# Patient Record
Sex: FEMALE | Race: WHITE | Employment: OTHER | URBAN - METROPOLITAN AREA
[De-identification: names, ages, dates, MRNs, and addresses within clinical notes are randomized per-mention and may not be internally consistent; named-entity substitution may affect disease eponyms.]

---

## 2017-01-17 ENCOUNTER — TRANSFERRED RECORDS (OUTPATIENT)
Dept: HEALTH INFORMATION MANAGEMENT | Facility: CLINIC | Age: 67
End: 2017-01-17

## 2017-01-20 ENCOUNTER — ANESTHESIA (OUTPATIENT)
Dept: SURGERY | Facility: CLINIC | Age: 67
End: 2017-01-20
Payer: MEDICARE

## 2017-01-20 ENCOUNTER — SURGERY (OUTPATIENT)
Age: 67
End: 2017-01-20

## 2017-01-20 PROCEDURE — 25000125 ZZHC RX 250: Performed by: NURSE ANESTHETIST, CERTIFIED REGISTERED

## 2017-01-20 PROCEDURE — 25000128 H RX IP 250 OP 636: Performed by: NURSE ANESTHETIST, CERTIFIED REGISTERED

## 2017-01-20 PROCEDURE — 25000125 ZZHC RX 250: Performed by: ORTHOPAEDIC SURGERY

## 2017-01-20 PROCEDURE — 25800025 ZZH RX 258: Performed by: ANESTHESIOLOGY

## 2017-01-20 RX ORDER — NEOSTIGMINE METHYLSULFATE 1 MG/ML
VIAL (ML) INJECTION PRN
Status: DISCONTINUED | OUTPATIENT
Start: 2017-01-20 | End: 2017-01-20

## 2017-01-20 RX ORDER — LIDOCAINE HYDROCHLORIDE 20 MG/ML
INJECTION, SOLUTION INFILTRATION; PERINEURAL PRN
Status: DISCONTINUED | OUTPATIENT
Start: 2017-01-20 | End: 2017-01-20

## 2017-01-20 RX ORDER — PROPOFOL 10 MG/ML
INJECTION, EMULSION INTRAVENOUS PRN
Status: DISCONTINUED | OUTPATIENT
Start: 2017-01-20 | End: 2017-01-20

## 2017-01-20 RX ORDER — ONDANSETRON 2 MG/ML
INJECTION INTRAMUSCULAR; INTRAVENOUS PRN
Status: DISCONTINUED | OUTPATIENT
Start: 2017-01-20 | End: 2017-01-20

## 2017-01-20 RX ORDER — LIDOCAINE HYDROCHLORIDE 10 MG/ML
INJECTION, SOLUTION INFILTRATION; PERINEURAL PRN
Status: DISCONTINUED | OUTPATIENT
Start: 2017-01-20 | End: 2017-01-20

## 2017-01-20 RX ORDER — FENTANYL CITRATE 50 UG/ML
INJECTION, SOLUTION INTRAMUSCULAR; INTRAVENOUS PRN
Status: DISCONTINUED | OUTPATIENT
Start: 2017-01-20 | End: 2017-01-20

## 2017-01-20 RX ORDER — GLYCOPYRROLATE 0.2 MG/ML
INJECTION, SOLUTION INTRAMUSCULAR; INTRAVENOUS PRN
Status: DISCONTINUED | OUTPATIENT
Start: 2017-01-20 | End: 2017-01-20

## 2017-01-20 RX ORDER — EPHEDRINE SULFATE 50 MG/ML
INJECTION, SOLUTION INTRAMUSCULAR; INTRAVENOUS; SUBCUTANEOUS PRN
Status: DISCONTINUED | OUTPATIENT
Start: 2017-01-20 | End: 2017-01-20

## 2017-01-20 RX ADMIN — FENTANYL CITRATE 50 MCG: 50 INJECTION, SOLUTION INTRAMUSCULAR; INTRAVENOUS at 08:32

## 2017-01-20 RX ADMIN — SODIUM CHLORIDE, POTASSIUM CHLORIDE, SODIUM LACTATE AND CALCIUM CHLORIDE: 600; 310; 30; 20 INJECTION, SOLUTION INTRAVENOUS at 07:30

## 2017-01-20 RX ADMIN — CEFAZOLIN SODIUM 2 G: 2 INJECTION, SOLUTION INTRAVENOUS at 08:14

## 2017-01-20 RX ADMIN — PROPOFOL 160 MG: 10 INJECTION, EMULSION INTRAVENOUS at 07:47

## 2017-01-20 RX ADMIN — ONDANSETRON 4 MG: 2 INJECTION INTRAMUSCULAR; INTRAVENOUS at 09:01

## 2017-01-20 RX ADMIN — LIDOCAINE HYDROCHLORIDE 0.1 ML: 10 INJECTION, SOLUTION INFILTRATION; PERINEURAL at 07:15

## 2017-01-20 RX ADMIN — ROCURONIUM BROMIDE 50 MG: 10 INJECTION INTRAVENOUS at 07:47

## 2017-01-20 RX ADMIN — GLYCOPYRROLATE 0.6 MG: 0.2 INJECTION, SOLUTION INTRAMUSCULAR; INTRAVENOUS at 09:03

## 2017-01-20 RX ADMIN — LIDOCAINE HYDROCHLORIDE 100 MG: 20 INJECTION, SOLUTION INFILTRATION; PERINEURAL at 07:47

## 2017-01-20 RX ADMIN — SODIUM CHLORIDE 125 ML: 900 IRRIGANT IRRIGATION at 09:11

## 2017-01-20 RX ADMIN — NEOSTIGMINE METHYLSULFATE 4 MG: 1 INJECTION INTRAMUSCULAR; INTRAVENOUS; SUBCUTANEOUS at 09:03

## 2017-01-20 RX ADMIN — MIDAZOLAM HYDROCHLORIDE 2 MG: 1 INJECTION, SOLUTION INTRAMUSCULAR; INTRAVENOUS at 07:30

## 2017-01-20 RX ADMIN — FENTANYL CITRATE 50 MCG: 50 INJECTION, SOLUTION INTRAMUSCULAR; INTRAVENOUS at 07:47

## 2017-01-20 RX ADMIN — FENTANYL CITRATE 50 MCG: 50 INJECTION, SOLUTION INTRAMUSCULAR; INTRAVENOUS at 09:30

## 2017-01-20 RX ADMIN — Medication 10 MG: at 07:53

## 2017-02-02 ENCOUNTER — OFFICE VISIT (OUTPATIENT)
Dept: ORTHOPEDICS | Facility: CLINIC | Age: 67
End: 2017-02-02

## 2017-02-02 VITALS — WEIGHT: 191 LBS | BODY MASS INDEX: 35.15 KG/M2 | HEIGHT: 62 IN

## 2017-02-02 DIAGNOSIS — M79.89 SOFT TISSUE MASS: Primary | ICD-10-CM

## 2017-02-02 DIAGNOSIS — D17.9 ATYPICAL LIPOMA OF SOFT TISSUE: Primary | ICD-10-CM

## 2017-02-02 PROBLEM — A04.72 COLITIS DUE TO CLOSTRIDIUM DIFFICILE: Status: ACTIVE | Noted: 2017-01-30

## 2017-02-02 PROBLEM — E66.9 SIMPLE OBESITY: Status: ACTIVE | Noted: 2017-01-17

## 2017-02-02 PROBLEM — R73.03 BORDERLINE DIABETES MELLITUS: Status: ACTIVE | Noted: 2017-01-17

## 2017-02-02 PROBLEM — I10 ESSENTIAL HYPERTENSION: Status: ACTIVE | Noted: 2017-01-17

## 2017-02-02 RX ORDER — GABAPENTIN 100 MG/1
100 CAPSULE ORAL 3 TIMES DAILY
Qty: 90 CAPSULE | Refills: 1 | Status: SHIPPED | OUTPATIENT
Start: 2017-02-02 | End: 2017-10-09

## 2017-02-02 RX ORDER — SUCRALFATE ORAL 1 G/10ML
1000 SUSPENSION ORAL
COMMUNITY
Start: 2017-01-03 | End: 2020-12-03 | Stop reason: DRUGHIGH

## 2017-02-02 RX ORDER — NICOTINE POLACRILEX 2 MG
GUM BUCCAL
COMMUNITY
End: 2020-12-03 | Stop reason: DRUGHIGH

## 2017-02-02 RX ORDER — ALBUTEROL SULFATE 90 UG/1
2 AEROSOL, METERED RESPIRATORY (INHALATION)
COMMUNITY
Start: 2017-01-26 | End: 2020-12-03 | Stop reason: DRUGHIGH

## 2017-02-02 RX ORDER — BUTALBITAL, ASPIRIN, AND CAFFEINE 325; 50; 40 MG/1; MG/1; MG/1
CAPSULE ORAL
COMMUNITY
End: 2017-02-02

## 2017-02-02 NOTE — Clinical Note
2/2/2017       RE: Janey Bo  [address] [facility] [place] [address]     Dear Colleague,    Thank you for referring your patient, Janey Bo, to the [facility] ORTHOPAEDIC CLINIC at [facility] Please see a copy of my visit note below.    Ref MD:    Dr. Jerod Redding, med oncology, [facility]  Dr. David Gaines, general surgery    Dx:    1. 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 Sciatic n compression  3. 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  4. H/o thyroid CA, breast CA, pancreatic CA    Procedures:  1. 11/07/14, Left thigh mass marginal excision (Eric)  2. 1/20/2017, Excision L thigh atypical lipomatous tumor 7 cm , deep. (Eric) [facility]      Returns after surgery above.   Having dysesthesias along lateral foot and ankle  Incision healed.  Peroneal and anterior tib 5/5 function    IMP:  1. Reviewed pathology reports from both 2014 and 2017.  MDM2 amplification was noted in 2014.  2. We discussed the risks, benefits and alternatives of radiation as an adjuvant.  3. She has indicated a preference to forego adjuvant radiation and accept the higher risk of local relapse.  She will also discuss this with Dr. Redding.    4. Rx for neurontin given.  We discussed consequences of long term usage and need to taper gradually.  5. Handicapped parking sticker forms completed today.  6. We will monitor her with MRI q 6 months.      Kofi Reyes MD  [facility] Family Professor  Oncology and Adult Reconstructive Surgery  Dept Orthopaedic Surgery, [facility]  [phone] office, [phone] pager  [email or web address]         Copath Report     Patient Name: JANEY BO   MR#: [id number]   Specimen #:    Collected: 1/20/2017   Received: 1/20/2017   Reported: 1/24/2017 18:38   Ordering Phy(s): KOFI REYES     For improved result formatting, select 'View Enhanced Report Format'   under Linked Documents section.      SPECIMEN(S):   Left thigh mass     FINAL DIAGNOSIS:   Soft tissue, left thigh mass, excision:   - Atypical lipomatous tumor (well-differentiated liposarcoma)   - Tumor size: 7.1 cm   - Extent of tumor: Deep   - No evidence of dedifferentiation   - Margins positive for neoplasm   - Pathologic stage (AJCC 7th ed.): rpT2b   - See comment for complete tumor synopsis     Report Name: Soft Tissue - Resection   Status: Submitted     Part(s) Involved:   A: Left thigh mass     Synoptic Report:     CLINICAL     Preresection Treatment:         - Therapy performed, type not specified - Prior resection     SPECIMEN     Procedure:         - Marginal resection     Primary Tumor Site:         - Connective, subcutaneous and other soft tissues       Site of Connective, Subcutaneous and Other Soft Tissues:           - Lower limb and hip - left thigh     Primary Tumor Site Laterality:         - Left     TUMOR     Histologic Type:         - Adipocytic Tumors       Type of Adipocytic Tumors:           - Atypical lipomatous tumor / Well differentiated liposarcoma     Histologic Grade:         - Grade 1     EXTENT     Macroscopic Extent of Tumor:         - Deep       Extent of Deep Involvement:           - Intramuscular     Tumor Size: 7.1 x 4.5 x 3.1 cm     MARGINS     Margins:         - Margin(s) positive for sarcoma       Margin(s):           - Cannot be determined - Unoriented specimen     ACCESSORY FINDINGS     Mitotic Rate       Mitotic Rate Per 10 High Power Fields (HPF): 0     Necrosis:         - Not identified     Lymph-Vascular Invasion:         - Not identified     Treatment Effect:         - Not identified     SPECIAL STUDIES     Immunohistochemistry:         - Not performed     Cytogenetics:         - Not performed     Molecular Pathology:         - Not performed     LYMPH NODES     Lymph Nodes: No nodes submitted or found     STAGE (PTNM)     TNM Descriptors:         - r (recurrent)     Primary Tumor (pT):         - pT2b:  "Tumor more than 5 cm in greatest dimension, deep tumor     Regional Lymph Nodes (pN):         - pNX: Regional lymph nodes cannot be assessed     Distant Metastasis (pM):         - Not applicable     CAP [facility] March 2016 Annual Release     I have personally reviewed all specimens and or slides, including the   listed special stains, and used them with my medical judgement to   determine the final diagnosis.     Electronically signed out by:     Cristóbal Pinedo M.D., [facility]     CLINICAL HISTORY:   66-year-old woman with recurrent atypical lipomatous tumor, status post   resection from the posterior left thigh in 11/2014.  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     GROSS:   The specimen is received in formalin with proper patient identification,   labeled \"L. thigh mass\", and consists of a 39.6 g, 7.1 x 4.5 x 3.1 cm   well-encapsulated oval portion of pale pink soft tissue.  No orientation   is provided.  The surface is inked black, and the specimen is serially   sectioned to reveal homogeneous pale pink-yellow, glistening cut   surfaces.  Representative sections are submitted in cassettes 1-5.   (Dictated by: Katy Valdez 1/20/2017 11:14 AM)     MICROSCOPIC:   Microscopic examination was performed.     CPT Codes:   A: 12933-MX4     TESTING LAB LOCATION:   [address] [id number] [phone]     COLLECTION SITE:   Client: [facility]   Location: UROR (B)          Patient Name: EMA AUGUSTINE   MR#: [id number]   Specimen #: [id number]   Collected: 11/7/2014   Received: 11/7/2014   Reported: 11/12/2014 14:31   Ordering Phy(s): KOFI MAYS               SPECIMEN(S):   Left posterior thigh mass with " "portion of hamstring     FINAL DIAGNOSIS:   Soft tissue, left thigh mass, excision:   -Atypical lipomatous tumor with intramuscular component (see comment)     COMMENT:   Histologic sections show a lipomatous tumor with focal atypia.  A   component of the tumor shows infiltration between muscle fibers.   Immunostain, performed with appropriate controls shows diffuse   reactivity with antibodies to p16.  Accumulation of p16 has been   attributed to amplification of the MDM2 cell cycle oncogene.   Accordingly, this neoplasm is best classified as atypical lipomatous   tumor/well differentiated liposarcoma, tumor that may be locally   recurrent.       I have personally reviewed all specimens and or slides, including the   listed special stains, and used them with my medical judgement to   determine the final diagnosis.     Electronically signed out by:     Elizabet Salter M.D, [facility]       CLINICAL HISTORY:   The patient is a 64-year-old woman with a left thigh mass.  Operative   procedure: Excision of left thigh mass (procedure identified a 30 cm   lipomatous mass).       GROSS:   The specimen is received in formalin labeled with proper patient's   identification, labeled \"left posterior thigh mass with portion of   hamstring\" and consists of three unoriented yellow-tan adipose tissue   masses measuring 17.2 x 8.5 x 4.1 cm, 8.5 x 7.2 x 2.3 cm, and 11.2 x 3.5   x 2.0 cm. the total weight of specimen is 590 g. The resection margins   are inked in black. Serial sectioning demonstrates homogeneous   yellow-tan adipose tissue mass without evidence of necrosis or   hemorrhage.  The medium sized mass is skeletal muscular tissue with   scattered small amount of adipose tissue component. Representative   sections are submitted in 12 cassettes.     Summary of Sections:   A1-A8 -representative sections of the largest mass   A9-A10 - the smallest mass   A11-A12 - the medium sized mass (Dictated by: Colette Figueroa 11/7/2014 "   01:54 PM)           MICROSCOPIC:   Microscopic examination performed.               CPT Codes:   A: 80206-KW6, 69603-DBT     TESTING LAB LOCATION:   [facility], [address] [place] [address] [phone]     COLLECTION SITE:   Client: [facility]   Location: UROR (B)              Again, thank you for allowing me to participate in the care of your patient.      Sincerely,    John Reyes MD

## 2017-02-02 NOTE — NURSING NOTE
"Reason For Visit:   Chief Complaint   Patient presents with     Surgical Followup     2WK POP F/U Excision Of Left Thigh Mass DOS: 1/20/17           Ht 1.562 m (5' 1.5\")  Wt 86.637 kg (191 lb)  BMI 35.51 kg/m2                  Current Outpatient Prescriptions   Medication     albuterol (VENTOLIN HFA) 108 (90 BASE) MCG/ACT Inhaler     sucralfate (CARAFATE) 1 GM/10ML suspension     aspirin 81 MG tablet     Biotin 1 MG CAPS     Calcium-Magnesium-Vitamin D (CALCIUM MAGNESIUM PO)     Amylase-Lipase-Protease (CREON 10 PO)     NEW MED     NAPROXEN SODIUM PO     MetroNIDAZOLE (FLAGYL PO)     oxyCODONE (ROXICODONE) 5 MG IR tablet     hydrOXYzine (ATARAX) 10 MG tablet     Cephalexin (KEFLEX PO)     amylase-lipase-protease (CREON 24) 43882 UNITS CPEP per EC capsule     raloxifene (EVISTA) 60 MG tablet     Naproxen Sodium (ALEVE PO)     OMEPRAZOLE PO     triamterene-hydrochlorothiazide (DYAZIDE) 37.5-25 MG per capsule     metoprolol (TOPROL-XL) 25 MG 24 hr tablet     cyclobenzaprine (FLEXERIL) 2.5 MG tablet     potassium chloride (K-DUR) 10 MEQ tablet     Calcium Carbonate-Vitamin D (CALCIUM 500+D PO)     Cholecalciferol (VITAMIN D) 1000 UNIT capsule     mesalamine (ASACOL) 400 MG EC tablet     desipramine (NOPRAMIN) 10 MG tablet     albuterol 90 MCG/ACT inhaler     aspirin 81 MG tablet     escitalopram (LEXAPRO) 5 MG tablet     levothyroxine (SYNTHROID, LEVOTHROID) 137 MCG tablet     buPROPion (WELLBUTRIN SR) 200 MG 12 hr tablet     No current facility-administered medications for this visit.       Allergies   Allergen Reactions     Pineapple Anaphylaxis     Walnuts [Nuts] Anaphylaxis     Morphine Hcl Itching       Stefany Cummins C.M.A."

## 2017-02-02 NOTE — PROGRESS NOTES
Ref MD:    Dr. Jerod Redding, med oncology, [facility]  Dr. David Gaines, general surgery    Dx:    1. 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 Sciatic n compression  3. 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  4. H/o thyroid CA, breast CA, pancreatic CA    Procedures:  1. 11/07/14, Left thigh mass marginal excision (Eric)  2. 1/20/2017, Excision L thigh atypical lipomatous tumor 7 cm , deep. (Eric) [facility]      Returns after surgery above.   Having dysesthesias along lateral foot and ankle  Incision healed.  Peroneal and anterior tib 5/5 function    IMP:  1. Reviewed pathology reports from both 2014 and 2017.  MDM2 amplification was noted in 2014.  2. We discussed the risks, benefits and alternatives of radiation as an adjuvant.  3. She has indicated a preference to forego adjuvant radiation and accept the higher risk of local relapse.  She will also discuss this with Dr. Redding.    4. Rx for neurontin given.  We discussed consequences of long term usage and need to taper gradually.  5. Handicapped parking sticker forms completed today.  6. We will monitor her with MRI q 6 months.      Kofi Reyes MD  [facility] Family Professor  Oncology and Adult Reconstructive Surgery  Dept Orthopaedic Surgery, [facility]  [phone] office, [phone] pager  [email or web address]         Copath Report     Patient Name: EMA AUGUSTINE   MR#: [id number]   Specimen #:    Collected: 1/20/2017   Received: 1/20/2017   Reported: 1/24/2017 18:38   Ordering Phy(s): KOFI REYES     For improved result formatting, select 'View Enhanced Report Format'   under Linked Documents section.     SPECIMEN(S):   Left thigh mass     FINAL DIAGNOSIS:   Soft tissue, left thigh mass, excision:   - Atypical lipomatous tumor (well-differentiated liposarcoma)   - Tumor size: 7.1 cm   - Extent of tumor: Deep   - No evidence of dedifferentiation   - Margins positive for neoplasm   - Pathologic stage  (AJCC 7th ed.): rpT2b   - See comment for complete tumor synopsis     Report Name: Soft Tissue - Resection   Status: Submitted     Part(s) Involved:   A: Left thigh mass     Synoptic Report:     CLINICAL     Preresection Treatment:         - Therapy performed, type not specified - Prior resection     SPECIMEN     Procedure:         - Marginal resection     Primary Tumor Site:         - Connective, subcutaneous and other soft tissues       Site of Connective, Subcutaneous and Other Soft Tissues:           - Lower limb and hip - left thigh     Primary Tumor Site Laterality:         - Left     TUMOR     Histologic Type:         - Adipocytic Tumors       Type of Adipocytic Tumors:           - Atypical lipomatous tumor / Well differentiated liposarcoma     Histologic Grade:         - Grade 1     EXTENT     Macroscopic Extent of Tumor:         - Deep       Extent of Deep Involvement:           - Intramuscular     Tumor Size: 7.1 x 4.5 x 3.1 cm     MARGINS     Margins:         - Margin(s) positive for sarcoma       Margin(s):           - Cannot be determined - Unoriented specimen     ACCESSORY FINDINGS     Mitotic Rate       Mitotic Rate Per 10 High Power Fields (HPF): 0     Necrosis:         - Not identified     Lymph-Vascular Invasion:         - Not identified     Treatment Effect:         - Not identified     SPECIAL STUDIES     Immunohistochemistry:         - Not performed     Cytogenetics:         - Not performed     Molecular Pathology:         - Not performed     LYMPH NODES     Lymph Nodes: No nodes submitted or found     STAGE (PTNM)     TNM Descriptors:         - r (recurrent)     Primary Tumor (pT):         - pT2b: Tumor more than 5 cm in greatest dimension, deep tumor     Regional Lymph Nodes (pN):         - pNX: Regional lymph nodes cannot be assessed     Distant Metastasis (pM):         - Not applicable     CAP [facility] March 2016 Annual Release     I have personally reviewed all specimens and or slides, including  "the   listed special stains, and used them with my medical judgement to   determine the final diagnosis.     Electronically signed out by:     Cristóbal Pinedo M.D., [facility]     CLINICAL HISTORY:   66-year-old woman with recurrent atypical lipomatous tumor, status post   resection from the posterior left thigh in 11/2014.  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     GROSS:   The specimen is received in formalin with proper patient identification,   labeled \"L. thigh mass\", and consists of a 39.6 g, 7.1 x 4.5 x 3.1 cm   well-encapsulated oval portion of pale pink soft tissue.  No orientation   is provided.  The surface is inked black, and the specimen is serially   sectioned to reveal homogeneous pale pink-yellow, glistening cut   surfaces.  Representative sections are submitted in cassettes 1-5.   (Dictated by: Katy Valdez 1/20/2017 11:14 AM)     MICROSCOPIC:   Microscopic examination was performed.     CPT Codes:   A: 85235-MO7     TESTING LAB LOCATION:   [address] [address] [phone]     COLLECTION SITE:   Client: [facility]   Location: UROR (B)          Patient Name: EMA AUGUSTINE   MR#: [id number]   Specimen #: [id number]   Collected: 11/7/2014   Received: 11/7/2014   Reported: 11/12/2014 14:31   Ordering Phy(s): KOFI MAYS               SPECIMEN(S):   Left posterior thigh mass with portion of hamstring     FINAL DIAGNOSIS:   Soft tissue, left thigh mass, excision:   -Atypical lipomatous tumor with intramuscular component (see comment)     COMMENT:   Histologic sections show a lipomatous tumor with focal atypia.  A   component of the tumor shows infiltration between muscle fibers. " "  Immunostain, performed with appropriate controls shows diffuse   reactivity with antibodies to p16.  Accumulation of p16 has been   attributed to amplification of the MDM2 cell cycle oncogene.   Accordingly, this neoplasm is best classified as atypical lipomatous   tumor/well differentiated liposarcoma, tumor that may be locally   recurrent.       I have personally reviewed all specimens and or slides, including the   listed special stains, and used them with my medical judgement to   determine the final diagnosis.     Electronically signed out by:     Elizabet Salter M.D, [facility]       CLINICAL HISTORY:   The patient is a 64-year-old woman with a left thigh mass.  Operative   procedure: Excision of left thigh mass (procedure identified a 30 cm   lipomatous mass).       GROSS:   The specimen is received in formalin labeled with proper patient's   identification, labeled \"left posterior thigh mass with portion of   hamstring\" and consists of three unoriented yellow-tan adipose tissue   masses measuring 17.2 x 8.5 x 4.1 cm, 8.5 x 7.2 x 2.3 cm, and 11.2 x 3.5   x 2.0 cm. the total weight of specimen is 590 g. The resection margins   are inked in black. Serial sectioning demonstrates homogeneous   yellow-tan adipose tissue mass without evidence of necrosis or   hemorrhage.  The medium sized mass is skeletal muscular tissue with   scattered small amount of adipose tissue component. Representative   sections are submitted in 12 cassettes.     Summary of Sections:   A1-A8 -representative sections of the largest mass   A9-A10 - the smallest mass   A11-A12 - the medium sized mass (Dictated by: Colette Figueroa 11/7/2014   01:54 PM)           MICROSCOPIC:   Microscopic examination performed.               CPT Codes:   A: 17089-JZ3, 81257-WMQ     TESTING LAB LOCATION:   [address] [address] [phone]     COLLECTION SITE: "   Client: [facility] [place]   Location: UROR (B)

## 2017-07-11 LAB — MAMMOGRAM: NORMAL

## 2017-09-26 DIAGNOSIS — D17.9 ATYPICAL LIPOMA OF SOFT TISSUE: Primary | ICD-10-CM

## 2017-10-09 ENCOUNTER — OFFICE VISIT (OUTPATIENT)
Dept: ORTHOPEDICS | Facility: CLINIC | Age: 67
End: 2017-10-09

## 2017-10-09 VITALS — WEIGHT: 189.5 LBS | BODY MASS INDEX: 35.78 KG/M2 | HEIGHT: 61 IN

## 2017-10-09 DIAGNOSIS — D17.1 BENIGN LIPOMATOUS NEOPLASM OF SKIN AND SUBCUTANEOUS TISSUE OF TRUNK: Primary | ICD-10-CM

## 2017-10-09 ASSESSMENT — ENCOUNTER SYMPTOMS
POOR WOUND HEALING: 0
HOARSE VOICE: 1
NAIL CHANGES: 1
SINUS CONGESTION: 0
SMELL DISTURBANCE: 0
TROUBLE SWALLOWING: 0
SINUS PAIN: 0
SORE THROAT: 0
SKIN CHANGES: 0
TASTE DISTURBANCE: 0
EYE WATERING: 0
NECK MASS: 0
EYE PAIN: 0
DOUBLE VISION: 0

## 2017-10-09 NOTE — NURSING NOTE
"Reason For Visit:   Chief Complaint   Patient presents with     RECHECK     Pt. states that she is here today for follow up after surgery and to review her MRI. HX: Excision Of Left Thigh Mass. DOS: 01/20/2017.      Dx:    1. 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 Sciatic n compression  3. 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  4. H/o thyroid CA, breast CA, pancreatic CA     Procedures:  1. 11/07/14, Left thigh mass marginal excision (Eric)  2. 1/20/2017, Excision L thigh atypical lipomatous tumor 7 cm , deep. (Eric) [facility]        Pain Assessment  Patient Currently in Pain: Yes  Primary Pain Location: Leg  Pain Orientation: Left  Pain Descriptors: Aching, Sore  Alleviating Factors: Rest  Aggravating Factors: Movement, Walking       HEIGHT: 5' 1\", WEIGHT: 189 lbs 8 oz, BMI: Body mass index is 35.81 kg/(m^2).      Current Outpatient Prescriptions   Medication Sig Dispense Refill     albuterol (VENTOLIN HFA) 108 (90 BASE) MCG/ACT Inhaler Inhale 2 puffs into the lungs       sucralfate (CARAFATE) 1 GM/10ML suspension Take 1,000 mg by mouth       aspirin 81 MG tablet Take 81 mg by mouth       Biotin 1 MG CAPS        Calcium-Magnesium-Vitamin D (CALCIUM MAGNESIUM PO) Take 600 mg by mouth       Amylase-Lipase-Protease (CREON 10 PO)        NEW MED        NAPROXEN SODIUM PO        MetroNIDAZOLE (FLAGYL PO)        hydrOXYzine (ATARAX) 10 MG tablet Take 1 tablet (10 mg) by mouth every 6 hours as needed for itching (and nausea) 30 tablet 0     Cephalexin (KEFLEX PO) Take 500 mg by mouth 2 times daily        amylase-lipase-protease (CREON 24) 19873 UNITS CPEP per EC capsule Take 1 capsule by mouth 3 times daily (with meals)       raloxifene (EVISTA) 60 MG tablet Take 60 mg by mouth daily       Naproxen Sodium (ALEVE PO)        OMEPRAZOLE PO Take 20 mg by mouth every morning        triamterene-hydrochlorothiazide (DYAZIDE) 37.5-25 MG per capsule Take 1 capsule by mouth daily 90 capsule 3     metoprolol " (TOPROL-XL) 25 MG 24 hr tablet Take 2 tablets by mouth daily       cyclobenzaprine (FLEXERIL) 2.5 MG tablet Take 1 tablet by mouth daily as needed       potassium chloride (K-DUR) 10 MEQ tablet Take 10 mEq by mouth daily 2 tablets       Calcium Carbonate-Vitamin D (CALCIUM 500+D PO) Take 2 tablets by mouth daily.       Cholecalciferol (VITAMIN D) 1000 UNIT capsule Take 2 capsules by mouth daily.       mesalamine (ASACOL) 400 MG EC tablet Take 2 tablets by mouth 2 times daily        desipramine (NOPRAMIN) 10 MG tablet Take one tab once daily.        albuterol 90 MCG/ACT inhaler Use as directed for exercise induced asthma. 1-2 puffs every 1-2 months       aspirin 81 MG tablet Take 1 tablet by mouth daily.       escitalopram (LEXAPRO) 5 MG tablet Take 5 mg by mouth every evening        levothyroxine (SYNTHROID, LEVOTHROID) 137 MCG tablet Take 137 mcg by mouth every evening        buPROPion (WELLBUTRIN SR) 200 MG 12 hr tablet Take 2 tablets by mouth daily.            Allergies   Allergen Reactions     Pineapple Anaphylaxis     Walnuts [Nuts] Anaphylaxis     Morphine Hcl Itching

## 2017-10-09 NOTE — LETTER
10/9/2017       RE: Janey Bo  [address] [facility] [address]     Dear Colleague,    Thank you for referring your patient, Janey Bo, to the [facility] ORTHOPAEDIC CLINIC at [facility]. Please see a copy of my visit note below.      Orthopaedic Oncology Surgery   Clinic visit -  John Reyes M.D.      Ref MD:    Dr. Jerod Redding, med oncology, [facility]  Dr. David Gaines, general surgery    Dx:    1. 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  3. 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  4. H/o thyroid CA, breast CA, pancreatic CA    Procedures:  1. 11/07/14, Left thigh mass marginal excision (Eric)  2. 1/20/2017, Excision L thigh atypical lipomatous tumor 7 cm, deep. (Eric) [facility]    INTERIM HX:  Returns after surgery above.   Her symptoms of dysesthesias along lateral foot and ankle and partially resolved and she is pleased with the long term outcome of her surgery.  She is not taking, and never wanted to try, the gabapentin.    EXAM:  Incision healed.  Peroneal and anterior tib 5/5 function    MRI 7/2017:  Post surgical changes.  Some intraneural change post-op.  No clear evidence of tumor relapse.    IMP:  1. Reviewed pathology reports from both 2014 and 2017.  MDM2 amplification was noted in 2014.  2. We discussed the risks, benefits and alternatives of radiation as an adjuvant.  3. She has indicated a preference to forego adjuvant radiation and accept the higher risk of local relapse.    4. We will monitor her with MRI q 6 months.        MD Brianna Sommers Family Professor  Oncology and Adult Reconstructive Surgery  Dept Orthopaedic Surgery, [facility]  [phone] office, [phone] pager  [email or web address]    Total Time = 10 min, 50% of which was spent in counseling and coordination of care as documented  above.

## 2017-10-09 NOTE — MR AVS SNAPSHOT
"              After Visit Summary   10/9/2017    Janey Bo    MRN: [id number]           Patient Information     Date Of Birth          1950        Visit Information        Provider Department      10/9/2017 9:15 AM John Reyes MD 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    -  1       Follow-ups after your visit        Who to contact     Please call your clinic at [phone] to:    Ask questions about your health    Make or cancel appointments    Discuss your medicines    Learn about your test results    Speak to your doctor   If you have compliments or concerns about an experience at your clinic, or if you wish to file a complaint, please contact [facility] Patient Relations at [phone] or email us at [email or web address]         Additional Information About Your Visit        MyChart Information     Photo[employer] is an electronic gateway that provides easy, online access to your medical records. With [employer], you can request a clinic appointment, read your test results, renew a prescription or communicate with your care team.     To sign up for PhotoSolart visit the website at [email or web address]   You will be asked to enter the access code listed below, as well as some personal information. Please follow the directions to create your username and password.     Your access code is: [email or web address]  Expires: 2017  6:30 AM     Your access code will  in 90 days. If you need help or a new code, please contact your [facility] or call [phone] for assistance.        Care EveryWhere ID     This is your Care EveryWhere ID. This could be used by other organizations to access your [facility] medical records  [id number]        Your Vitals Were     Height BMI (Body Mass Index)                1.549 m (5' 1\") 35.81 kg/m2           Blood " Pressure from Last 3 Encounters:   01/20/17 91/51   11/10/14 117/54   04/08/14 119/66    Weight from Last 3 Encounters:   10/09/17 86 kg (189 lb 8 oz)   02/02/17 86.6 kg (191 lb)   01/20/17 86.6 kg (191 lb)              Today, you had the following     No orders found for display       Primary Care Provider Office Phone # Fax #    Jerod Redding [phone]       [facility] [address]  [facility] [address]        Equal Access to Services     [facility]: Hadii aad sherry hadasho Somarcie, waaxda luqadaha, qaybta kaalmada courtney, javed carreno . So [facility] [phone].    ATENCIÓN: Si habla español, tiene a nicholas disposición servicios gratuitos de asistencia lingüística. [facility] [phone].    We comply with applicable federal civil rights laws and [place] laws. We do not discriminate on the basis of race, color, national origin, age, disability, sex, sexual orientation, or gender identity.            Thank you!     Thank you for choosing [facility]  for your care. Our goal is always to provide you with excellent care. Hearing back from our patients is one way we can continue to improve our services. Please take a few minutes to complete the written survey that you may receive in the mail after your visit with us. Thank you!             Your Updated Medication List - Protect others around you: Learn how to safely use, store and throw away your medicines at www.disposemymeds.org.          This list is accurate as of: 10/9/17 10:04 AM.  Always use your most recent med list.                   Brand Name Dispense Instructions for use Diagnosis    albuterol 90 MCG/ACT inhaler      Use as directed for exercise induced asthma. 1-2 puffs every 1-2 months        ALEVE PO           amylase-lipase-protease 65518-53252 UNITS Cpep per EC capsule    CREON 24     Take 1 capsule by mouth 3 times daily (with meals)        ASACOL 400 MG EC tablet    Generic drug:  mesalamine      Take 2 tablets by mouth 2 times daily        * aspirin 81 MG tablet      Take 81 mg by mouth        * aspirin 81 MG tablet      Take 1 tablet by mouth daily.        Biotin 1 MG Caps           CALCIUM 500+D PO      Take 2 tablets by mouth daily.        CALCIUM MAGNESIUM PO      Take 600 mg by mouth        CREON 10 PO           cyclobenzaprine 2.5 mg half-tab    FLEXERIL     Take 1 tablet by mouth daily as needed        desipramine 10 MG tablet    NORPRAMIN     Take one tab once daily.        DYAZIDE 37.5-25 MG per capsule   Generic drug:  triamterene-hydrochlorothiazide     90 capsule    Take 1 capsule by mouth daily        FLAGYL PO           hydrOXYzine 10 MG tablet    ATARAX    30 tablet    Take 1 tablet (10 mg) by mouth every 6 hours as needed for itching (and nausea)    Soft tissue mass       KEFLEX PO      Take 500 mg by mouth 2 times daily        levothyroxine 137 MCG tablet    SYNTHROID/LEVOTHROID     Take 137 mcg by mouth every evening        LEXAPRO 5 MG tablet   Generic drug:  escitalopram      Take 5 mg by mouth every evening        metoprolol 25 MG 24 hr tablet    TOPROL-XL     Take 2 tablets by mouth daily        NAPROXEN SODIUM PO           NEW MED           OMEPRAZOLE PO      Take 20 mg by mouth every morning        potassium chloride 10 MEQ tablet    K-TAB,KLOR-CON     Take 10 mEq by mouth daily 2 tablets        raloxifene 60 MG tablet    Evista     Take 60 mg by mouth daily        sucralfate 1 GM/10ML suspension    CARAFATE     Take 1,000 mg by mouth        VENTOLIN  (90 BASE) MCG/ACT Inhaler   Generic drug:  albuterol      Inhale 2 puffs into the lungs        vitamin D 1000 UNITS capsule      Take 2 capsules by mouth daily.        WELLBUTRIN  MG 12 hr tablet   Generic drug:  buPROPion      Take 2 tablets by mouth daily.        * Notice:  This list has 2 medication(s) that are the same as other medications prescribed for you. Read the directions  carefully, and ask your doctor or other care provider to review them with you.

## 2017-10-09 NOTE — PROGRESS NOTES
Orthopaedic Oncology Surgery   Clinic visit -  Jhon Reeys M.D.      Ref MD:    Dr. Jerod Redding, med oncology, [facility]  Dr. David Gaines, general surgery    Dx:    1. 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  3. 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  4. H/o thyroid CA, breast CA, pancreatic CA    Procedures:  1. 11/07/14, Left thigh mass marginal excision (Eric)  2. 1/20/2017, Excision L thigh atypical lipomatous tumor 7 cm, deep. (Eric) [facility]    INTERIM HX:  Returns after surgery above.   Her symptoms of dysesthesias along lateral foot and ankle and partially resolved and she is pleased with the long term outcome of her surgery.  She is not taking, and never wanted to try, the gabapentin.    EXAM:  Incision healed.  Peroneal and anterior tib 5/5 function    MRI 7/2017:  Post surgical changes.  Some intraneural change post-op.  No clear evidence of tumor relapse.    IMP:  1. Reviewed pathology reports from both 2014 and 2017.  MDM2 amplification was noted in 2014.  2. We discussed the risks, benefits and alternatives of radiation as an adjuvant.  3. She has indicated a preference to forego adjuvant radiation and accept the higher risk of local relapse.    4. We will monitor her with MRI q 6 months.        John Reyes MD  [facility] Family Professor  Oncology and Adult Reconstructive Surgery  Dept Orthopaedic Surgery, [facility]  [phone] office, [phone] pager  [email or web address]    Total Time = 10 min, 50% of which was spent in counseling and coordination of care as documented above.          Answers for HPI/ROS submitted by the patient on 10/9/2017   General Symptoms: No  Skin Symptoms: Yes  HENT Symptoms: Yes  EYE SYMPTOMS: Yes  HEART SYMPTOMS: Yes  LUNG SYMPTOMS: Yes  INTESTINAL SYMPTOMS: Yes  URINARY SYMPTOMS: Yes  GYNECOLOGIC SYMPTOMS: No  BREAST SYMPTOMS:  No  SKELETAL SYMPTOMS: Yes  BLOOD SYMPTOMS: No  NERVOUS SYSTEM SYMPTOMS: Yes  MENTAL HEALTH SYMPTOMS: Yes  Changes in hair: Yes  Changes in moles/birth marks: No  Itching: Yes  Rashes: No  Changes in nails: Yes  Acne: No  Hair in places you don't want it: No  Change in facial hair: Yes  Warts: Yes  Non-healing sores: No  Scarring: No  Flaking of skin: Yes  Color changes of hands/feet in cold : Yes  Sun sensitivity: Yes  Skin thickening: No  Ear pain: No  Ear discharge: No  Hearing loss: No  Tinnitus: Yes  Nosebleeds: Yes  Congestion: No  Sinus pain: No  Trouble swallowing: No   Voice hoarseness: Yes  Mouth sores: Yes  Sore throat: No  Tooth pain: Yes  Gum tenderness: Yes  Bleeding gums: Yes  Change in taste: No  Change in sense of smell: No  Dry mouth: Yes  Hearing aid used: No  Neck lump: No  Eye pain: No  Vision loss: No  Dry eyes: Yes  Watery eyes: No  Eye bulging: No  Double vision: No  Flashing of lights: No  Spots: No  Floaters: Yes

## 2019-01-17 LAB — MAMMOGRAM: NORMAL

## 2019-01-21 ENCOUNTER — PATIENT OUTREACH (OUTPATIENT)
Dept: CARE COORDINATION | Facility: CLINIC | Age: 69
End: 2019-01-21

## 2019-01-31 ENCOUNTER — OFFICE VISIT (OUTPATIENT)
Dept: ORTHOPEDICS | Facility: CLINIC | Age: 69
End: 2019-01-31
Payer: COMMERCIAL

## 2019-01-31 DIAGNOSIS — M79.662 PAIN OF LEFT CALF: Primary | ICD-10-CM

## 2019-01-31 ASSESSMENT — ENCOUNTER SYMPTOMS
MYALGIAS: 1
STIFFNESS: 1
ARTHRALGIAS: 1
NECK PAIN: 1
MUSCLE CRAMPS: 1
BACK PAIN: 1
SINUS CONGESTION: 1
HOARSE VOICE: 1

## 2019-01-31 NOTE — PROGRESS NOTES
[facility], Orthopaedic Oncology Surgery Consultation  by John Reyes M.D.    Janey Bo MRN# [id number]   Age: 68 year old YOB: 1950     Requesting physician: Dr. Jerod Redding, med oncology, [facility]  Dr. David Gaines, general surgery     Dx:    1. 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  3. 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  4. H/o thyroid CA, breast CA, pancreatic CA     Procedures:  1. 11/07/14, Left thigh mass marginal excision (Eric)  2. 1/20/2017, Excision L thigh atypical lipomatous tumor 7 cm, deep. 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  The mass more distally that is apparent, should be investigated with an MRI of the region to rule out any malignant change there.     Plan:  We are scheduling an MRI of Christiano left calf region, to assess the lesion in the proximal subcutaneous tissues, and we will contact her with the results once available.     Addendum February 4, 2019:  We received the MRI of October 2018 of her bilateral calves.  I reviewed the images and there is no evidence of any type of atypical lipoma or other neoplasm evident.  Do not think further imaging is necessary unless in the future she should note an enlarging mass developing.  Nazanin Barnes RN will notify patient.            History of Present Illness:   68 year old female returning to see e for evaluation of a new problem with her left calf. I last evaluated the patient on 10/9/2017, at which time she was following up for evaluation after the above last surgery. Today, Janey reports approximately 6-month history of a soft nontender mass in her posterior left proximal calf.  She notices this about 6 months ago, and does not feel that is increased in size over that  time..  It does not restrict the motion of the knee.  He has no mass or symptoms more proximally, in the region of the previous tumor.        Current symptoms:    Awakens from sleep due to sx's:  No  Precipitating Injury:  No    Other joints or sites painful:  No  Fever: No  Appetite change or weight loss: No           Physical Exam:     EXAMINATION pertinent findings:   VITAL SIGNS: There were no vitals taken for this visit.  There is no height or weight on file to calculate BMI.  RESP: non labored breathing   ABD: benign   SKIN: grossly normal   LYMPHATIC: grossly normal   NEURO: grossly normal   VASCULAR: satisfactory perfusion of all extremities   MUSCULOSKELETAL:   Janey has normal lower limb alignment.  On careful palpation of the proximal left calf there is a fullness in the subcutaneous tissues perhaps consistent with an underlying lipoma.  It is difficult to elicit until one palpates the contralateral side.  It is certainly not tender.  It is approximately 5 x 7 cm in size.                  Data:   All laboratory data reviewed  All imaging studies reviewed by me    MRI of the thigh performed in December demonstrates no local recurrence in the bed of the previous tumor excision.  Unfortunately MRI does not go distally enough to look at the area of interest today.         - Dr. Clif Trinidad (Orthopaedic Fellow)    Pager [phone]    John Reyes MD  [facility] Family Professor  Oncology and Adult Reconstructive Surgery  Dept Orthopaedic Surgery, [facility]  [phone] office, [phone] pager  [email or web address]    Scribe Disclosure:   Du JOAQUIN, am serving as a scribe to document services personally performed by John Reyes MD at this visit, based upon the provider's statements to me. All documentation has been reviewed by the aforementioned provider prior to being entered into the official medical record.       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     Anemia     Gastritis     Encounter for other preprocedural examination     Tachycardia     Tremor     Simple obesity     Colitis due to Clostridium difficile     Essential hypertension     Malignant neoplasm of thyroid gland (H)     Complications of medical care     Migraine without status migrainosus, not intractable     Premature beats     Palpitations     Borderline diabetes mellitus     Atypical lipoma of soft tissue     Past Medical History:   Diagnosis Date     Anxiety      Arrhythmia      Breast cancer (H)      Carpal tunnel syndrome      Chronic osteoarthritis      Depressive disorder      Edema      Fatigue      Gastro-oesophageal reflux disease      Heart murmur      Hyperlipaemia      Migraines      Osteoarthrosis      Palpitations      Pancreatic cancer (H)      Pancreatic disease      PONV (postoperative nausea and vomiting)      PVC's (premature ventricular contractions)      Scoliosis      Shortness of breath      Thyroid disease     Hypothyroidsim     Uncomplicated asthma     Exercise induced.        Also see scanned health assessment forms.       Past Surgical History:     Past Surgical History:   Procedure Laterality Date     APPENDECTOMY       C HAND/FINGER SURGERY UNLISTED       C STOMACH SURGERY PROCEDURE UNLISTED       CHOLECYSTECTOMY       COLONOSCOPY       EXCISE MASS LOWER EXTREMITY Left 11/7/2014    Procedure: EXCISE MASS LOWER EXTREMITY;  Surgeon: John Reyes MD;  Location: [facility] OR     EXCISE VILLALOBOS'S NEUROMA FOOT       EXCISE SOFT TISSUE TUMOR THIGH Left 1/20/2017    Procedure: EXCISE SOFT TISSUE TUMOR THIGH;  Surgeon: John Reyes MD;  Location: [facility] OR     INNER EAR SURGERY       LUMPECTOMY BREAST       OOPHORECTOMY       partial hysterectomy       thyroidectomy       TONSILLECTOMY &  ADENOIDECTOMY       WHIPPLE PROCEDURE       ZZ GASTROSCOPY,FL              Social History:     Social History     Socioeconomic History     Marital status:      Spouse name: Not on file     Number of children: Not on file     Years of education: Not on file     Highest education level: Not on file   Social Needs     Financial resource strain: Not on file     Food insecurity - worry: Not on file     Food insecurity - inability: Not on file     Transportation needs - medical: Not on file     Transportation needs - non-medical: Not on file   Occupational History     Not on file   Tobacco Use     Smoking status: Former Smoker     Packs/day: 1.50     Years: 15.00     Pack years: 22.50     Types: Cigarettes     Smokeless tobacco: Never Used     Tobacco comment: quit 30 yrs ago   Substance and Sexual Activity     Alcohol use: No     Drug use: No     Sexual activity: Not Currently   Other Topics Concern     Not on file   Social History Narrative     Not on file            Family History:       Family History   Problem Relation Age of Onset     Asthma Paternal Grandfather      Diabetes Father      Diabetes Paternal Grandmother      Hypertension Father      Osteoporosis Mother      Osteoporosis Father      Osteoporosis Paternal Grandmother      Cerebrovascular Disease Mother      Cerebrovascular Disease Father      Thyroid Disease Mother      Thyroid Disease Father      Thyroid Disease Paternal Grandmother      Obesity Father      Obesity Paternal Grandfather      Other Cancer Father      Migraines Father      Colon Cancer Paternal Grandfather      Deep Vein Thrombosis Mother      Hypothyroidism Mother      Hypothyroidism Father      Hypothyroidism Paternal Grandmother      Low Back Problems Paternal Grandmother      Spine Problems Mother       () Unknown             Medications:     Current Outpatient Medications   Medication Sig     albuterol (VENTOLIN HFA) 108 (90 BASE) MCG/ACT Inhaler Inhale 2 puffs into the lungs      albuterol 90 MCG/ACT inhaler Use as directed for exercise induced asthma. 1-2 puffs every 1-2 months     Amylase-Lipase-Protease (CREON 10 PO)      amylase-lipase-protease (CREON 24) 84073 UNITS CPEP per EC capsule Take 1 capsule by mouth 3 times daily (with meals)     aspirin 81 MG tablet Take 81 mg by mouth     aspirin 81 MG tablet Take 1 tablet by mouth daily.     Biotin 1 MG CAPS      buPROPion (WELLBUTRIN SR) 200 MG 12 hr tablet Take 2 tablets by mouth daily.     Calcium Carbonate-Vitamin D (CALCIUM 500+D PO) Take 2 tablets by mouth daily.     Calcium-Magnesium-Vitamin D (CALCIUM MAGNESIUM PO) Take 600 mg by mouth     Cephalexin (KEFLEX PO) Take 500 mg by mouth 2 times daily      Cholecalciferol (VITAMIN D) 1000 UNIT capsule Take 2 capsules by mouth daily.     cyclobenzaprine (FLEXERIL) 2.5 MG tablet Take 1 tablet by mouth daily as needed     desipramine (NOPRAMIN) 10 MG tablet Take one tab once daily.      escitalopram (LEXAPRO) 5 MG tablet Take 5 mg by mouth every evening      hydrOXYzine (ATARAX) 10 MG tablet Take 1 tablet (10 mg) by mouth every 6 hours as needed for itching (and nausea)     levothyroxine (SYNTHROID, LEVOTHROID) 137 MCG tablet Take 137 mcg by mouth every evening      mesalamine (ASACOL) 400 MG EC tablet Take 2 tablets by mouth 2 times daily      metoprolol (TOPROL-XL) 25 MG 24 hr tablet Take 2 tablets by mouth daily     MetroNIDAZOLE (FLAGYL PO)      Naproxen Sodium (ALEVE PO)      NAPROXEN SODIUM PO      NEW MED      OMEPRAZOLE PO Take 20 mg by mouth every morning      potassium chloride (K-DUR) 10 MEQ tablet Take 10 mEq by mouth daily 2 tablets     raloxifene (EVISTA) 60 MG tablet Take 60 mg by mouth daily     sucralfate (CARAFATE) 1 GM/10ML suspension Take 1,000 mg by mouth     triamterene-hydrochlorothiazide (DYAZIDE) 37.5-25 MG per capsule Take 1 capsule by mouth daily     No current facility-administered medications for this visit.               Review of Systems:   A comprehensive  10 point review of systems (constitutional, ENT, cardiac, peripheral vascular, lymphatic, respiratory, GI, , Musculoskeletal, skin, Neurological) was performed and found to be negative except as described in this note.     See intake form completed by patient    Answers for HPI/ROS submitted by the patient on 1/31/2019   General Symptoms: No  Skin Symptoms: No  HENT Symptoms: Yes  EYE SYMPTOMS: No  HEART SYMPTOMS: No  LUNG SYMPTOMS: No  INTESTINAL SYMPTOMS: No  URINARY SYMPTOMS: No  GYNECOLOGIC SYMPTOMS: No  BREAST SYMPTOMS: No  SKELETAL SYMPTOMS: Yes  BLOOD SYMPTOMS: No  NERVOUS SYSTEM SYMPTOMS: No  MENTAL HEALTH SYMPTOMS: No  Tinnitus: Yes  Nosebleeds: Yes  Congestion: Yes   Voice hoarseness: Yes  Bleeding gums: Yes  Back pain: Yes  Muscle aches: Yes  Neck pain: Yes  Joint pain: Yes  Muscle cramps: Yes  Joint stiffness: Yes    Attending MD (Dr. John Reyes) Attestation :  This patient was seen and evaluated by me including a history, exam, and interpretation of all imaging and/or lab data.  Either a training physician (resident/fellow), who also saw the patient, or scribe has documented the visit in the attached note.    MD Brianna Sommers Family Professor  Oncology and Adult Reconstructive Surgery  Dept Orthopaedic Surgery, [facility]  [phone] office, [phone] pager  [email or web address]

## 2019-01-31 NOTE — LETTER
1/31/2019       RE: Janey Bo  [address] [facility] [place] [address]     Dear Colleague,    Thank you for referring your patient, Janey Bo, to the [facility]. Please see a copy of my visit note below.        [facility], Orthopaedic Oncology Surgery Consultation  by John Reyes M.D.    Janey Bo MRN# [id number]   Age: 68 year old YOB: 1950     Requesting physician: Dr. Jerod Redding, med oncology, [facility]  Dr. David Gaines, general surgery     Dx:    1. 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  3. 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  4. H/o thyroid CA, breast CA, pancreatic CA     Procedures:  1. 11/07/14, Left thigh mass marginal excision (Eric)  2. 1/20/2017, Excision L thigh atypical lipomatous tumor 7 cm, deep. 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  The mass more distally that is apparent, should be investigated with an MRI of the region to rule out any malignant change there.     Plan:  We are scheduling an MRI of Christiano left calf region, to assess the lesion in the proximal subcutaneous tissues, and we will contact her with the results once available.           History of Present Illness:   68 year old female returning to see e for evaluation of a new problem with her left calf. I last evaluated the patient on 10/9/2017, at which time she was following up for evaluation after the above last surgery. Today, Janey reports approximately 6-month history of a soft nontender mass in her posterior left proximal calf.  She notices this about 6 months ago, and does not feel that is increased in size over that time..  It does not restrict the motion of the knee.   He has no mass or symptoms more proximally, in the region of the previous tumor.      Current symptoms:    Awakens from sleep due to sx's:  No  Precipitating Injury:  No    Other joints or sites painful:  No  Fever: No  Appetite change or weight loss: No         Physical Exam:     EXAMINATION pertinent findings:   VITAL SIGNS: There were no vitals taken for this visit.  There is no height or weight on file to calculate BMI.  RESP: non labored breathing   ABD: benign   SKIN: grossly normal   LYMPHATIC: grossly normal   NEURO: grossly normal   VASCULAR: satisfactory perfusion of all extremities   MUSCULOSKELETAL:   Janey has normal lower limb alignment.  On careful palpation of the proximal left calf there is a fullness in the subcutaneous tissues perhaps consistent with an underlying lipoma.  It is difficult to elicit until one palpates the contralateral side.  It is certainly not tender.  It is approximately 5 x 7 cm in size.              Data:   All laboratory data reviewed  All imaging studies reviewed by me    MRI of the thigh performed in December demonstrates no local recurrence in the bed of the previous tumor excision.  Unfortunately MRI does not go distally enough to look at the area of interest today.    - Dr. Clif Trinidad (Orthopaedic Fellow)    Pager [phone]    John Reyes MD  [facility] Professor  Oncology and Adult Reconstructive Surgery  Dept Orthopaedic Surgery, [facility]  [phone] office, [phone] pager  [email or web address]    Scribe Disclosure:   Du JOAQUIN, am serving as a scribe to document services personally performed by John Reyes MD at this visit, based upon the provider's statements to me. All documentation has been reviewed by the aforementioned provider prior to being entered into the official medical record.       DATA for DOCUMENTATION:         Past Medical History:     Patient Active Problem List   Diagnosis     Hypothyroidism     Thyroid  cancer (H)     Abnormal glucose     Soft tissue mass     Benign lipomatous neoplasm of skin and subcutaneous tissue of trunk     Anemia     Gastritis     Encounter for other preprocedural examination     Tachycardia     Tremor     Simple obesity     Colitis due to Clostridium difficile     Essential hypertension     Malignant neoplasm of thyroid gland (H)     Complications of medical care     Migraine without status migrainosus, not intractable     Premature beats     Palpitations     Borderline diabetes mellitus     Atypical lipoma of soft tissue     Past Medical History:   Diagnosis Date     Anxiety      Arrhythmia      Breast cancer (H)      Carpal tunnel syndrome      Chronic osteoarthritis      Depressive disorder      Edema      Fatigue      Gastro-oesophageal reflux disease      Heart murmur      Hyperlipaemia      Migraines      Osteoarthrosis      Palpitations      Pancreatic cancer (H)      Pancreatic disease      PONV (postoperative nausea and vomiting)      PVC's (premature ventricular contractions)      Scoliosis      Shortness of breath      Thyroid disease     Hypothyroidsim     Uncomplicated asthma     Exercise induced.        Also see scanned health assessment forms.       Past Surgical History:     Past Surgical History:   Procedure Laterality Date     APPENDECTOMY       C HAND/FINGER SURGERY UNLISTED       C STOMACH SURGERY PROCEDURE UNLISTED       CHOLECYSTECTOMY       COLONOSCOPY       EXCISE MASS LOWER EXTREMITY Left 11/7/2014    Procedure: EXCISE MASS LOWER EXTREMITY;  Surgeon: John Reyes MD;  Location: [facility] OR     EXCISE VILLALOBOS'S NEUROMA FOOT       EXCISE SOFT TISSUE TUMOR THIGH Left 1/20/2017    Procedure: EXCISE SOFT TISSUE TUMOR THIGH;  Surgeon: John Reyes MD;  Location: [facility] OR     INNER EAR SURGERY       LUMPECTOMY BREAST       OOPHORECTOMY       partial hysterectomy       thyroidectomy       TONSILLECTOMY & ADENOIDECTOMY       WHIPPLE PROCEDURE       ZZ GASTROSCOPY,FL               Social History:     Social History     Socioeconomic History     Marital status:      Spouse name: Not on file     Number of children: Not on file     Years of education: Not on file     Highest education level: Not on file   Social Needs     Financial resource strain: Not on file     Food insecurity - worry: Not on file     Food insecurity - inability: Not on file     Transportation needs - medical: Not on file     Transportation needs - non-medical: Not on file   Occupational History     Not on file   Tobacco Use     Smoking status: Former Smoker     Packs/day: 1.50     Years: 15.00     Pack years: 22.50     Types: Cigarettes     Smokeless tobacco: Never Used     Tobacco comment: quit 30 yrs ago   Substance and Sexual Activity     Alcohol use: No     Drug use: No     Sexual activity: Not Currently   Other Topics Concern     Not on file   Social History Narrative     Not on file            Family History:     Family History   Problem Relation Age of Onset     Asthma Paternal Grandfather      Diabetes Father      Diabetes Paternal Grandmother      Hypertension Father      Osteoporosis Mother      Osteoporosis Father      Osteoporosis Paternal Grandmother      Cerebrovascular Disease Mother      Cerebrovascular Disease Father      Thyroid Disease Mother      Thyroid Disease Father      Thyroid Disease Paternal Grandmother      Obesity Father      Obesity Paternal Grandfather      Other Cancer Father      Migraines Father      Colon Cancer Paternal Grandfather      Deep Vein Thrombosis Mother      Hypothyroidism Mother      Hypothyroidism Father      Hypothyroidism Paternal Grandmother      Low Back Problems Paternal Grandmother      Spine Problems Mother       () Unknown             Medications:     Current Outpatient Medications   Medication Sig     albuterol (VENTOLIN HFA) 108 (90 BASE) MCG/ACT Inhaler Inhale 2 puffs into the lungs     albuterol 90 MCG/ACT inhaler Use as directed for exercise induced  asthma. 1-2 puffs every 1-2 months     Amylase-Lipase-Protease (CREON 10 PO)      amylase-lipase-protease (CREON 24) 93162 UNITS CPEP per EC capsule Take 1 capsule by mouth 3 times daily (with meals)     aspirin 81 MG tablet Take 81 mg by mouth     aspirin 81 MG tablet Take 1 tablet by mouth daily.     Biotin 1 MG CAPS      buPROPion (WELLBUTRIN SR) 200 MG 12 hr tablet Take 2 tablets by mouth daily.     Calcium Carbonate-Vitamin D (CALCIUM 500+D PO) Take 2 tablets by mouth daily.     Calcium-Magnesium-Vitamin D (CALCIUM MAGNESIUM PO) Take 600 mg by mouth     Cephalexin (KEFLEX PO) Take 500 mg by mouth 2 times daily      Cholecalciferol (VITAMIN D) 1000 UNIT capsule Take 2 capsules by mouth daily.     cyclobenzaprine (FLEXERIL) 2.5 MG tablet Take 1 tablet by mouth daily as needed     desipramine (NOPRAMIN) 10 MG tablet Take one tab once daily.      escitalopram (LEXAPRO) 5 MG tablet Take 5 mg by mouth every evening      hydrOXYzine (ATARAX) 10 MG tablet Take 1 tablet (10 mg) by mouth every 6 hours as needed for itching (and nausea)     levothyroxine (SYNTHROID, LEVOTHROID) 137 MCG tablet Take 137 mcg by mouth every evening      mesalamine (ASACOL) 400 MG EC tablet Take 2 tablets by mouth 2 times daily      metoprolol (TOPROL-XL) 25 MG 24 hr tablet Take 2 tablets by mouth daily     MetroNIDAZOLE (FLAGYL PO)      Naproxen Sodium (ALEVE PO)      NAPROXEN SODIUM PO      NEW MED      OMEPRAZOLE PO Take 20 mg by mouth every morning      potassium chloride (K-DUR) 10 MEQ tablet Take 10 mEq by mouth daily 2 tablets     raloxifene (EVISTA) 60 MG tablet Take 60 mg by mouth daily     sucralfate (CARAFATE) 1 GM/10ML suspension Take 1,000 mg by mouth     triamterene-hydrochlorothiazide (DYAZIDE) 37.5-25 MG per capsule Take 1 capsule by mouth daily     No current facility-administered medications for this visit.             Review of Systems:   A comprehensive 10 point review of systems (constitutional, ENT, cardiac, peripheral  vascular, lymphatic, respiratory, GI, , Musculoskeletal, skin, Neurological) was performed and found to be negative except as described in this note.     See intake form completed by patient    Attending MD (Dr. John Reyes) Attestation :  This patient was seen and evaluated by me including a history, exam, and interpretation of all imaging and/or lab data.  Either a training physician (resident/fellow), who also saw the patient, or scribe has documented the visit in the attached note.    John Reyes MD  [facility] Family Professor  Oncology and Adult Reconstructive Surgery  Dept Orthopaedic Surgery, [facility]  [phone] office, [phone] pager  [email or web address]

## 2019-01-31 NOTE — NURSING NOTE
Chief Complaint   Patient presents with     RECHECK     Pt. states that she is here today after having a new MRI done 12/10/2018 S/p Left Thigh Mass Excision DOS: 1/20/2017       68 year old  1950         [id number] - [place], [place] - [address] [facility] [address]  [id number] ([place]) - [place], [place] - [address] AT [facility] [address]    Allergies   Allergen Reactions     Nuts Anaphylaxis     thickened throat     Pineapple Anaphylaxis and Other (See Comments)     Thickening in throat  thickened throat       Black [place] Flavor Other (See Comments)     thickened throat     Liquid Adhesive Other (See Comments)     Tears my skin off     Morphine Itching and Other (See Comments)     Itchiness       Morphine Hcl Itching       Current Outpatient Medications   Medication     albuterol (VENTOLIN HFA) 108 (90 BASE) MCG/ACT Inhaler     Amylase-Lipase-Protease (CREON 10 PO)     aspirin 81 MG tablet     Biotin 1 MG CAPS     buPROPion (WELLBUTRIN SR) 200 MG 12 hr tablet     Calcium-Magnesium-Vitamin D (CALCIUM MAGNESIUM PO)     Cholecalciferol (VITAMIN D) 1000 UNIT capsule     desipramine (NOPRAMIN) 10 MG tablet     DilTIAZem HCl Coated Beads (DILTIAZEM CD PO)     escitalopram (LEXAPRO) 5 MG tablet     hydrOXYzine (ATARAX) 10 MG tablet     levothyroxine (SYNTHROID, LEVOTHROID) 137 MCG tablet     mesalamine (ASACOL) 400 MG EC tablet     Naproxen Sodium (ALEVE PO)     potassium chloride (K-DUR) 10 MEQ tablet     raloxifene (EVISTA) 60 MG tablet     sucralfate (CARAFATE) 1 GM/10ML suspension     triamterene-hydrochlorothiazide (DYAZIDE) 37.5-25 MG per capsule     No current facility-administered medications for this visit.

## 2019-02-04 ENCOUNTER — TELEPHONE (OUTPATIENT)
Dept: ORTHOPEDICS | Facility: CLINIC | Age: 69
End: 2019-02-04

## 2019-02-04 NOTE — TELEPHONE ENCOUNTER
"Cori had left a message earlier today, telling us that she had asked [facility] to push the previous MRI scan that she had of her calf to our PACS.  This scan done at the end of October, showed no evidence of a mass in her left.  She will not need to get another scan unless she notices the \"fullness\" in her calf increasing.  "

## 2020-11-02 ENCOUNTER — DOCUMENTATION ONLY (OUTPATIENT)
Dept: CARE COORDINATION | Facility: CLINIC | Age: 70
End: 2020-11-02

## 2020-12-03 ENCOUNTER — OFFICE VISIT (OUTPATIENT)
Dept: ORTHOPEDICS | Facility: CLINIC | Age: 70
End: 2020-12-03
Payer: MEDICARE

## 2020-12-03 VITALS — BODY MASS INDEX: 33.72 KG/M2 | HEIGHT: 61 IN | WEIGHT: 178.6 LBS

## 2020-12-03 DIAGNOSIS — C49.22: Primary | ICD-10-CM

## 2020-12-03 PROBLEM — M19.011 PRIMARY OSTEOARTHRITIS, RIGHT SHOULDER: Status: ACTIVE | Noted: 2018-02-12

## 2020-12-03 PROBLEM — E11.9 TYPE 2 DIABETES MELLITUS WITHOUT COMPLICATION, WITHOUT LONG-TERM CURRENT USE OF INSULIN (H): Status: ACTIVE | Noted: 2018-08-11

## 2020-12-03 PROBLEM — M75.01 ADHESIVE CAPSULITIS OF RIGHT SHOULDER: Status: ACTIVE | Noted: 2018-03-14

## 2020-12-03 PROBLEM — M20.42 HAMMERTOE OF LEFT FOOT: Status: ACTIVE | Noted: 2018-10-15

## 2020-12-03 PROBLEM — M20.5X2 HALLUX LIMITUS OF LEFT FOOT: Status: ACTIVE | Noted: 2018-01-16

## 2020-12-03 PROBLEM — E66.09 NON MORBID OBESITY DUE TO EXCESS CALORIES: Status: ACTIVE | Noted: 2017-01-17

## 2020-12-03 PROBLEM — M67.911 TENDINOPATHY OF RIGHT ROTATOR CUFF: Status: ACTIVE | Noted: 2018-03-14

## 2020-12-03 PROBLEM — M75.41 IMPINGEMENT SYNDROME OF RIGHT SHOULDER: Status: ACTIVE | Noted: 2018-09-12

## 2020-12-03 PROBLEM — M85.80 OSTEOPENIA: Status: ACTIVE | Noted: 2018-08-09

## 2020-12-03 PROBLEM — M75.21 BICEPS TENDINITIS OF RIGHT UPPER EXTREMITY: Status: ACTIVE | Noted: 2018-02-12

## 2020-12-03 PROBLEM — M77.42 METATARSALGIA OF LEFT FOOT: Status: ACTIVE | Noted: 2018-01-16

## 2020-12-03 PROCEDURE — 99213 OFFICE O/P EST LOW 20 MIN: CPT | Performed by: ORTHOPAEDIC SURGERY

## 2020-12-03 RX ORDER — METRONIDAZOLE 7.5 MG/G
GEL TOPICAL AT BEDTIME
COMMUNITY
Start: 2020-09-12

## 2020-12-03 RX ORDER — RALOXIFENE HYDROCHLORIDE 60 MG/1
60 TABLET, FILM COATED ORAL EVERY MORNING
COMMUNITY
Start: 2020-10-06

## 2020-12-03 RX ORDER — POTASSIUM CHLORIDE 1500 MG/1
20 TABLET, EXTENDED RELEASE ORAL EVERY MORNING
COMMUNITY
Start: 2020-08-25 | End: 2024-09-20

## 2020-12-03 RX ORDER — ALBUTEROL SULFATE 90 UG/1
2 AEROSOL, METERED RESPIRATORY (INHALATION) EVERY 6 HOURS PRN
COMMUNITY
Start: 2020-07-28

## 2020-12-03 RX ORDER — ESCITALOPRAM OXALATE 10 MG/1
TABLET ORAL EVERY EVENING
COMMUNITY
End: 2024-09-20

## 2020-12-03 RX ORDER — TIZANIDINE 2 MG/1
2 TABLET ORAL AT BEDTIME
COMMUNITY
Start: 2020-09-23 | End: 2024-02-26

## 2020-12-03 RX ORDER — DIPHENHYDRAMINE HYDROCHLORIDE 25 MG/1
5 TABLET ORAL
COMMUNITY

## 2020-12-03 RX ORDER — BUTALBITAL, ASPIRIN, AND CAFFEINE 325; 50; 40 MG/1; MG/1; MG/1
CAPSULE ORAL
COMMUNITY

## 2020-12-03 RX ORDER — LEVOTHYROXINE SODIUM 150 UG/1
150 TABLET ORAL EVERY MORNING
COMMUNITY
Start: 2020-10-06

## 2020-12-03 RX ORDER — METHYLPHENIDATE HYDROCHLORIDE 10 MG/1
20 TABLET ORAL EVERY MORNING
COMMUNITY
Start: 2020-10-27

## 2020-12-03 RX ORDER — SUCRALFATE ORAL 1 G/10ML
1000 SUSPENSION ORAL PRN
COMMUNITY
Start: 2019-01-29

## 2020-12-03 ASSESSMENT — MIFFLIN-ST. JEOR: SCORE: 1256.62

## 2020-12-03 NOTE — LETTER
12/3/2020         RE: Janey Bo  [address] [facility] [place] [address]        Dear Colleague,    Thank you for referring your patient, Janey Bo, to the [facility] [place]. Please see a copy of my visit note below.        [facility], Orthopaedic Oncology Surgery Consultation  by John Reyes M.D.    Janey Bo MRN# [id number]   Age: 68 year old YOB: 1950     Requesting physician: Dr. Jerod Redding, med oncology, [facility]  Dr. David Gaines, general surgery     Dx:    1. 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  3. 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  4. H/o thyroid CA, breast CA, pancreatic CA     Procedures:  1. 11/07/14, Left thigh mass marginal excision (Eric)  2. 1/20/2017, Excision L thigh atypical lipomatous tumor 7 cm, deep. (Eric) [facility]    I met with Viv with back today and she feels that she has a mass implant in the posterior aspect of her left thigh.  I examined the area and a palpable swelling is present but is difficult to appreciate the difference between this and her underlying musculature.    Given her history I will arrange for a follow-up MRI examination of both her pelvis and the entire thigh.  2 separate studies will be needed.  Follow-up via [employer].    MD Brianna Sommers Family Professor  Oncology and Adult Reconstructive Surgery  Dept Orthopaedic Surgery, [facility]  [phone] office, [phone] pager  [email or web address]    Total Time = 15 min, 50% of which was spent in counseling and coordination of care as documented above.

## 2020-12-03 NOTE — PROGRESS NOTES
[facility], Orthopaedic Oncology Surgery Consultation  by John Reyes M.D.    Janey Bo MRN# [id number]   Age: 68 year old YOB: 1950     Requesting physician: Dr. Jerod Redding, med oncology, [facility]  Dr. David Gaines, general surgery     Dx:    1. 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  3. 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  4. H/o thyroid CA, breast CA, pancreatic CA     Procedures:  1. 11/07/14, Left thigh mass marginal excision (Eric)  2. 1/20/2017, Excision L thigh atypical lipomatous tumor 7 cm, deep. (Eric) [facility]    I met with Viv with back today and she feels that she has a mass implant in the posterior aspect of her left thigh.  I examined the area and a palpable swelling is present but is difficult to appreciate the difference between this and her underlying musculature.    Given her history I will arrange for a follow-up MRI examination of both her pelvis and the entire thigh.  2 separate studies will be needed.  Follow-up via [employer].    MD Brianna Sommers Family Professor  Oncology and Adult Reconstructive Surgery  Dept Orthopaedic Surgery, [facility]  [phone] office, [phone] pager  [email or web address]    Total Time = 15 min, 50% of which was spent in counseling and coordination of care as documented above.

## 2020-12-03 NOTE — NURSING NOTE
"Chief Complaint   Patient presents with     RECHECK     Pt. states that she is here today for Left Post. Thigh Mass. She states that she feels aching in her lower back into her leg for the past 3 months.         70 year old  1950    Ht 1.54 m (5' 0.63\")   Wt 81 kg (178 lb 9.6 oz)   BMI 34.16 kg/m          [id number] - [place], [place] - [address] AT [facility] [address]    Allergies   Allergen Reactions     Nuts Anaphylaxis     thickened throat     Pineapple Anaphylaxis and Other (See Comments)     Thickening in throat  thickened throat       Black [place] Flavor Other (See Comments)     thickened throat  thickened throat     Liquid Adhesive Other (See Comments)     Tears my skin off     Morphine Itching and Other (See Comments)     Itchiness       Morphine Hcl Itching       Current Outpatient Medications   Medication     albuterol (PROAIR HFA/PROVENTIL HFA/VENTOLIN HFA) 108 (90 Base) MCG/ACT inhaler     amylase-lipase-protease (CREON 24) 18436-22790 units CPEP per EC capsule     aspirin 81 MG tablet     biotin 5 MG CAPS     buPROPion (WELLBUTRIN SR) 200 MG 12 hr tablet     butalbital-aspirin-caffeine (FIORINAL) -40 MG capsule     Calcium-Magnesium-Vitamin D (CALCIUM MAGNESIUM PO)     cholecalciferol (VITAMIN D3) 10 mcg (400 units) TABS tablet     DilTIAZem HCl Coated Beads (DILTIAZEM CD PO)     escitalopram (LEXAPRO) 10 MG tablet     levothyroxine (SYNTHROID/LEVOTHROID) 150 MCG tablet     methylphenidate (RITALIN) 10 MG tablet     metroNIDAZOLE (METROGEL) 0.75 % external gel     NAPROXEN SODIUM PO     potassium chloride ER (KLOR-CON M) 20 MEQ CR tablet     Probiotic Product (ALIGN) CAPS     raloxifene (EVISTA) 60 MG tablet     sucralfate (CARAFATE) 1 GM/10ML suspension     tiZANidine (ZANAFLEX) 2 MG tablet     VITAMIN E BLEND PO     No current facility-administered medications for this visit.          "

## 2020-12-15 ENCOUNTER — ANCILLARY PROCEDURE (OUTPATIENT)
Dept: MRI IMAGING | Facility: CLINIC | Age: 70
End: 2020-12-15
Attending: ORTHOPAEDIC SURGERY
Payer: MEDICARE

## 2020-12-15 DIAGNOSIS — C49.22: ICD-10-CM

## 2020-12-15 PROCEDURE — A9585 GADOBUTROL INJECTION: HCPCS | Performed by: RADIOLOGY

## 2020-12-15 PROCEDURE — 72197 MRI PELVIS W/O & W/DYE: CPT | Performed by: RADIOLOGY

## 2020-12-15 PROCEDURE — 73720 MRI LWR EXTREMITY W/O&W/DYE: CPT | Mod: LT | Performed by: RADIOLOGY

## 2020-12-15 RX ORDER — GADOBUTROL 604.72 MG/ML
7.5 INJECTION INTRAVENOUS ONCE
Status: COMPLETED | OUTPATIENT
Start: 2020-12-15 | End: 2020-12-15

## 2020-12-15 RX ADMIN — GADOBUTROL 7.5 ML: 604.72 INJECTION INTRAVENOUS at 13:45

## 2020-12-21 ENCOUNTER — VIRTUAL VISIT (OUTPATIENT)
Dept: ORTHOPEDICS | Facility: CLINIC | Age: 70
End: 2020-12-21
Payer: MEDICARE

## 2020-12-21 ENCOUNTER — TELEPHONE (OUTPATIENT)
Dept: ORTHOPEDICS | Facility: CLINIC | Age: 70
End: 2020-12-21

## 2020-12-21 DIAGNOSIS — D17.9 ATYPICAL LIPOMA OF SOFT TISSUE: Primary | ICD-10-CM

## 2020-12-21 PROCEDURE — 99214 OFFICE O/P EST MOD 30 MIN: CPT | Mod: 95 | Performed by: ORTHOPAEDIC SURGERY

## 2020-12-21 NOTE — LETTER
12/21/2020         RE: Janey Bo  [address] [facility] [place] [address]        Dear Colleague,    Thank you for referring your patient, Janey Bo, to the [facility] [place]. Please see a copy of my visit note below.        [facility], Orthopaedic Oncology Surgery Consultation  by John Reyes M.D.    Janey Bo MRN# [id number]   Age: 68 year old YOB: 1950     Requesting physician: Dr. Jerod Redding, med oncology, [facility]  Dr. David Gaines, general surgery     Dx:    1. 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  3. 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  4. H/o thyroid CA, breast CA, pancreatic CA    Procedures:  1. 11/07/14, Left thigh mass marginal excision (Eric)  2. 1/20/2017, Excision L thigh atypical lipomatous tumor 7 cm, deep. (Eric) [facility]    History:  I met with Viv with back today and she feels that she has a mass implant in the posterior aspect of her left thigh.  I examined the area and a palpable swelling is present but is difficult to appreciate the difference between this and her underlying musculature.    Today we discussed the MRI findings which show an area of tumor relapse.      MRI 12/2020:  1. Multilobulated fatty appearing mass with low signal linear  stranding, intimate with the sciatic nerve, in the left upper thigh  stable in appearance and fairly stable in size to minimally increased,  measuring approximately 9-10 cm in craniocaudal dimension. Images  axial series 16,001 images 27-40 and coronal series 11,001 images  37-43.   2. No marrow signal abnormalities to suggest fracture or marrow  infiltration.  3. Probable cyst right kidney, correlate with prior imaging.    EDWAR JAFFE MD      Impression:  She has evidence of a second recurrence of her atypical lipomatous tumor.   This is not surprising given the previous involvement of the sciatic nerve and our goal of preservation of neurologic function at the risk of increasing the probability of tumor recurrence.  Again the patient has expressed a desire to preserve her sciatic nerve function.  I explained to the patient that I believe that repeated surgeries will entail a greater risk for a sciatic nerve palsy over time.  1 way to reduce the risk of tumor recurrence would be to employ external beam radiation either before or after tumor resection.  We discussed the pros and cons of administering radiation before or after tumor excision.  Unfortunately there are no other effective treatments other than radiation and surgery for this situation.  Fortunately she has not had a recurrence at a more proximal location.    Plan:  Patient would like to proceed with consultation with our radiation oncology division.  She previously had her breast cancer radiation done and have a [facility] but she would like to have this completed at [place] instead.  Furthermore, if we proceed with preoperative radiation, repeat imaging should be done after completion of her radiation for assessment of treatment response prior to repeat surgical excision.  Patient's preference is to preserve her sciatic nerve function and accept the higher risk of tumor relapse.  She understands she may undergo repeated excisions over time.    John Reyes MD  [place] Family Professor  Oncology and Adult Reconstructive Surgery  Dept Orthopaedic Surgery, [facility]  [phone] office, [phone] pager  [email or web address]    Total Time = 35 min, visit time 20 min, work time 15 min. 50% of which was spent in counseling and coordination of care as documented above.

## 2020-12-21 NOTE — NURSING NOTE
"Janey Bo is a 70 year old female who is being evaluated via a billable telephone visit.      The patient has been notified of following:     \"This telephone visit will be conducted via a call between you and your physician/provider. We have found that certain health care needs can be provided without the need for a physical exam.  This service lets us provide the care you need with a short phone conversation.  If a prescription is necessary we can send it directly to your pharmacy.  If lab work is needed we can place an order for that and you can then stop by our lab to have the test done at a later time.    Telephone visits are billed at different rates depending on your insurance coverage. During this emergency period, for some insurers they may be billed the same as an in-person visit.  Please reach out to your insurance provider with any questions.    If during the course of the call the physician/provider feels a telephone visit is not appropriate, you will not be charged for this service.\"    Patient has given verbal consent for Telephone visit?  Yes    What phone number would you like to be contacted at? [phone]    How would you like to obtain your AVS? Mail.        "

## 2020-12-21 NOTE — TELEPHONE ENCOUNTER
RN called and left patient a detailed   RN provided call back number.  Plan is for patient to reach out to Dr. Reynoso team for oncology consultation.  RN also previously reached out to Dr. Reynoso' team.    Ruby Burger RN

## 2020-12-21 NOTE — NURSING NOTE
Chief Complaint   Patient presents with     Results     F/u New MRI completed on 12/15/2020       70 year old  1950        [id number] - [place], [place] - [address] AT [facility] [address]    Allergies   Allergen Reactions     Nuts Anaphylaxis     thickened throat     Pineapple Anaphylaxis and Other (See Comments)     Thickening in throat  thickened throat       Black [place] Flavor Other (See Comments)     thickened throat  thickened throat     Liquid Adhesive Other (See Comments)     Tears my skin off     Morphine Itching and Other (See Comments)     Itchiness       Morphine Hcl Itching       Current Outpatient Medications   Medication     albuterol (PROAIR HFA/PROVENTIL HFA/VENTOLIN HFA) 108 (90 Base) MCG/ACT inhaler     amylase-lipase-protease (CREON 24) 57695-22050 units CPEP per EC capsule     aspirin 81 MG tablet     biotin 5 MG CAPS     buPROPion (WELLBUTRIN SR) 200 MG 12 hr tablet     butalbital-aspirin-caffeine (FIORINAL) -40 MG capsule     Calcium-Magnesium-Vitamin D (CALCIUM MAGNESIUM PO)     cholecalciferol (VITAMIN D3) 10 mcg (400 units) TABS tablet     DilTIAZem HCl Coated Beads (DILTIAZEM CD PO)     escitalopram (LEXAPRO) 10 MG tablet     levothyroxine (SYNTHROID/LEVOTHROID) 150 MCG tablet     methylphenidate (RITALIN) 10 MG tablet     metroNIDAZOLE (METROGEL) 0.75 % external gel     NAPROXEN SODIUM PO     potassium chloride ER (KLOR-CON M) 20 MEQ CR tablet     Probiotic Product (ALIGN) CAPS     raloxifene (EVISTA) 60 MG tablet     sucralfate (CARAFATE) 1 GM/10ML suspension     tiZANidine (ZANAFLEX) 2 MG tablet     VITAMIN E BLEND PO     No current facility-administered medications for this visit.

## 2020-12-21 NOTE — LETTER
12/21/2020         RE: Janey Bo  [address] [facility] [address]          [facility], Orthopaedic Oncology Surgery Consultation  by John Reyes M.D.    Janey Bo MRN# [id number]   Age: 68 year old YOB: 1950     Requesting physician: Dr. Jerod Redding, med oncology, [facility]  Dr. David Gaines, general surgery     Dx:    1. 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  3. 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  4. H/o thyroid CA, breast CA, pancreatic CA    Procedures:  1. 11/07/14, Left thigh mass marginal excision (Eric)  2. 1/20/2017, Excision L thigh atypical lipomatous tumor 7 cm, deep. (Eric) [facility]    History:  I met with Viv with back today and she feels that she has a mass implant in the posterior aspect of her left thigh.  I examined the area and a palpable swelling is present but is difficult to appreciate the difference between this and her underlying musculature.    Today we discussed the MRI findings which show an area of tumor relapse.      MRI 12/2020:  1. Multilobulated fatty appearing mass with low signal linear  stranding, intimate with the sciatic nerve, in the left upper thigh  stable in appearance and fairly stable in size to minimally increased,  measuring approximately 9-10 cm in craniocaudal dimension. Images  axial series 16,001 images 27-40 and coronal series 11,001 images  37-43.   2. No marrow signal abnormalities to suggest fracture or marrow  infiltration.  3. Probable cyst right kidney, correlate with prior imaging.    EDWAR JAFFE MD      Impression:  She has evidence of a second recurrence of her atypical lipomatous tumor.  This is not surprising given the previous involvement of the sciatic nerve and our goal of preservation of neurologic function at the risk of increasing the probability of tumor  recurrence.  Again the patient has expressed a desire to preserve her sciatic nerve function.  I explained to the patient that I believe that repeated surgeries will entail a greater risk for a sciatic nerve palsy over time.  1 way to reduce the risk of tumor recurrence would be to employ external beam radiation either before or after tumor resection.  We discussed the pros and cons of administering radiation before or after tumor excision.  Unfortunately there are no other effective treatments other than radiation and surgery for this situation.  Fortunately she has not had a recurrence at a more proximal location.    Plan:  Patient would like to proceed with consultation with our radiation oncology division.  She previously had her breast cancer radiation done and have a [facility] but she would like to have this completed at [place] instead.  Furthermore, if we proceed with preoperative radiation, repeat imaging should be done after completion of her radiation for assessment of treatment response prior to repeat surgical excision.  Patient's preference is to preserve her sciatic nerve function and accept the higher risk of tumor relapse.  She understands she may undergo repeated excisions over time.    MD Brianna Sommers Family Professor  Oncology and Adult Reconstructive Surgery  Dept Orthopaedic Surgery, [facility]  [phone] office, [phone] pager  [email or web address]    Total Time = 35 min, visit time 20 min, work time 15 min. 50% of which was spent in counseling and coordination of care as documented above.

## 2020-12-21 NOTE — PROGRESS NOTES
[facility], Orthopaedic Oncology Surgery Consultation  by John Reyes M.D.    Janey Bo MRN# [id number]   Age: 68 year old YOB: 1950     Requesting physician: Dr. Jerod Redding, med oncology, [facility]  Dr. David Gaines, general surgery     Dx:    1. 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  3. 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  4. H/o thyroid CA, breast CA, pancreatic CA    Procedures:  1. 11/07/14, Left thigh mass marginal excision (Eric)  2. 1/20/2017, Excision L thigh atypical lipomatous tumor 7 cm, deep. (Eric) [facility]    History:  I met with Viv with back today and she feels that she has a mass implant in the posterior aspect of her left thigh.  I examined the area and a palpable swelling is present but is difficult to appreciate the difference between this and her underlying musculature.    Today we discussed the MRI findings which show an area of tumor relapse.      MRI 12/2020:  1. Multilobulated fatty appearing mass with low signal linear  stranding, intimate with the sciatic nerve, in the left upper thigh  stable in appearance and fairly stable in size to minimally increased,  measuring approximately 9-10 cm in craniocaudal dimension. Images  axial series 16,001 images 27-40 and coronal series 11,001 images  37-43.   2. No marrow signal abnormalities to suggest fracture or marrow  infiltration.  3. Probable cyst right kidney, correlate with prior imaging.    EDWAR JAFFE MD      Impression:  She has evidence of a second recurrence of her atypical lipomatous tumor.  This is not surprising given the previous involvement of the sciatic nerve and our goal of preservation of neurologic function at the risk of increasing the probability of tumor recurrence.  Again the patient has expressed a desire to preserve her sciatic nerve function.  I  explained to the patient that I believe that repeated surgeries will entail a greater risk for a sciatic nerve palsy over time.  1 way to reduce the risk of tumor recurrence would be to employ external beam radiation either before or after tumor resection.  We discussed the pros and cons of administering radiation before or after tumor excision.  Unfortunately there are no other effective treatments other than radiation and surgery for this situation.  Fortunately she has not had a recurrence at a more proximal location.    Plan:  Patient would like to proceed with consultation with our radiation oncology division.  She previously had her breast cancer radiation done and have a [facility] but she would like to have this completed at [place] instead.  Furthermore, if we proceed with preoperative radiation, repeat imaging should be done after completion of her radiation for assessment of treatment response prior to repeat surgical excision.  Patient's preference is to preserve her sciatic nerve function and accept the higher risk of tumor relapse.  She understands she may undergo repeated excisions over time.    MD Brianna Sommers Family Professor  Oncology and Adult Reconstructive Surgery  Dept Orthopaedic Surgery, [facility]  [phone] office, [phone] pager  [email or web address]    Total Time = 35 min, visit time 20 min, work time 15 min. 50% of which was spent in counseling and coordination of care as documented above.

## 2020-12-23 ENCOUNTER — TELEPHONE (OUTPATIENT)
Dept: ORTHOPEDICS | Facility: CLINIC | Age: 70
End: 2020-12-23

## 2020-12-23 NOTE — TELEPHONE ENCOUNTER
RN called and spoke with patient. Patient expressed understanding with the plan moving forward.    Ruby Burger RN       Health Call Center    Phone Message    May a detailed message be left on voicemail: yes     Reason for Call: Other: Patient is calling to let  know they wont be home so the numbers provided/picked are the best to get in touch with both Cori and her . If you could give her a call back and touch base on that VM that was left on 12/21 pt is unclear and did not know of that call that Tao left.      Action Taken: Other: ORTHO    Travel Screening: Not Applicable

## 2021-01-02 NOTE — PROGRESS NOTES
Janey Bo is a 70 year old female who is being evaluated via a billable video visit.      How would you like to obtain your AVS? MyChart  If the video visit is dropped, the invitation should be resent by: Text to cell phone: [phone]  Will anyone else be joining your video visit? No    Video Start Time: 1:10 pm      Video-Visit Details    Type of service:  Video Visit    Video End Time:1:50    Originating Location (pt. Location): Home    Distant Location (provider location):  [facility] RADIATION ONCOLOGY     Platform used for Video Visit: [employer]       Department of Radiation Oncology  [facility]  [address] [place] [address]  [phone]       Consultation Note    Name: Janey Bo MRN: [id number]   : 1950   Date of Service: 2021 Referring: Dr. 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     History of Present Illness   Ms. 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      She was initially diagnosed with atypical lipomatous tumor (ALT) of the left posterior thigh in  when she presented with an enlarging posterior left thigh mass over the last two years. MRI left femur on 2014 demonstrated an abnormal fatty soft tissue mass of the posterior left thigh measuring 20 x 9.6 x 5.8 cm abutting the sciatic nerve with fat infiltrating amongst fascicles of the nerve, concerning for liposarcoma. Fatty atrophy of the hamstrings musculature was noted. A staging CT CAP on 10/1/2014 showed no evidence of metastatic disease. On 2014, she underwent excision of the left posterior thigh mass by Dr. Reyes. 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     Since then, she has been under follow-up with Dr. Reyes. Her clinical exam showed a normal sciatic nerve function.  A surveillance MRI scan on 8/14/2015 showed a focal lobulated area close to the sciatic nerve with fat infiltrating between the nerve fascicles. A subsequent MRI on 4/26/2016 demonstrated interval enlargement of the posterior upper thigh soft tissue mass abutting the sciatic nerve measuring 3.1 x 2.2 x 4.7 cm (compared to 1.8 x 1.4 x 3.3 cm), concerning for residual versus recurrent tumor.    On 1/20/2017, she underwent reexcision of the posterior left thigh mass by Dr. Reyes. Pathology () was consistent with atypical lipomatous tumor, tumor size was 7.1 x 4.5 x 3.1 cm, no evidence of dedifferentiation, margins were positive, negative LVSI.  Adjuvant radiation therapy was discussed with the patient given the high risk of local failure, although she ultimately declined and preferred to proceed with follow-up.  Subsequent surveillance images showed a stable soft tissue mass along the course of sciatic nerve. Most recent CT chest on 7/30/2020 showed no evidence of metastatic disease.      On 12/3/2020, the patient felt a mass in her left thigh which was evident on clinical exam. MRI left femur on 12/15/2020 showed stable to mild increase of size of this soft tissue mass measuring 10 cm in craniocaudal dimensions, consistent with recurrent disease. Dr. Reyes discussed a repeat resection and radiation therapy with the patient. She was referred to us for further discussion of radiation therapy option.    We had a video visit with the patient earlier today. She was doing okay so far. She reported occasional numbness and muscle aches along her posterior thigh. She occasionally takes Aleve for pain control. She otherwise denied any recent fever, chills, nausea, vomiting, headaches, shortness of breath, urinary or  bowel symptoms, weakness, swelling, weight or appetite changes.    Past Medical History:  Past Medical History:   Diagnosis Date     Anxiety      Arrhythmia      Breast cancer (H)      Carpal tunnel syndrome      Chronic osteoarthritis      Depressive disorder      Diabetes (H) 2018     Edema      Fatigue      Gastro-oesophageal reflux disease      Heart murmur      Hyperlipaemia      Migraines      Osteoarthrosis      Palpitations      Pancreatic cancer (H)      Pancreatic disease      PONV (postoperative nausea and vomiting)      PVC's (premature ventricular contractions)      Scoliosis      Shortness of breath      Thyroid disease     Hypothyroidsim     Uncomplicated asthma     Exercise induced.      Past Surgical History:  Past Surgical History:   Procedure Laterality Date     APPENDECTOMY       C HAND/FINGER SURGERY UNLISTED       C STOMACH SURGERY PROCEDURE UNLISTED       CHOLECYSTECTOMY       COLONOSCOPY       EXCISE MASS LOWER EXTREMITY Left 11/7/2014    Procedure: EXCISE MASS LOWER EXTREMITY;  Surgeon: John Reyes MD;  Location: [facility] OR     EXCISE VILLALOBOS'S NEUROMA FOOT       EXCISE SOFT TISSUE TUMOR THIGH Left 1/20/2017    Procedure: EXCISE SOFT TISSUE TUMOR THIGH;  Surgeon: John Reyes MD;  Location: [facility] OR     INNER EAR SURGERY       LUMPECTOMY BREAST       OOPHORECTOMY       partial hysterectomy       thyroidectomy       TONSILLECTOMY & ADENOIDECTOMY       WHIPPLE PROCEDURE       ZZ GASTROSCOPY,FL       Chemotherapy History:  None    Radiation History:  Adjuvant Radiation Therapy for breast cancer at [facility]    Pregnant: No     Implanted Cardiac Devices: No    Medications:  Current Outpatient Medications   Medication     albuterol (PROAIR HFA/PROVENTIL HFA/VENTOLIN HFA) 108 (90 Base) MCG/ACT inhaler     amylase-lipase-protease (CREON 24) 40501-29271 units CPEP per EC capsule     aspirin 81 MG tablet     biotin 5 MG CAPS     buPROPion (WELLBUTRIN SR) 200 MG 12 hr tablet      butalbital-aspirin-caffeine (FIORINAL) -40 MG capsule     Calcium-Magnesium-Vitamin D (CALCIUM MAGNESIUM PO)     cholecalciferol (VITAMIN D3) 10 mcg (400 units) TABS tablet     DilTIAZem HCl Coated Beads (DILTIAZEM CD PO)     escitalopram (LEXAPRO) 10 MG tablet     levothyroxine (SYNTHROID/LEVOTHROID) 150 MCG tablet     methylphenidate (RITALIN) 10 MG tablet     metroNIDAZOLE (METROGEL) 0.75 % external gel     NAPROXEN SODIUM PO     potassium chloride ER (KLOR-CON M) 20 MEQ CR tablet     Probiotic Product (ALIGN) CAPS     raloxifene (EVISTA) 60 MG tablet     sucralfate (CARAFATE) 1 GM/10ML suspension     tiZANidine (ZANAFLEX) 2 MG tablet     VITAMIN E BLEND PO     No current facility-administered medications for this visit.      Allergies:   Allergies   Allergen Reactions     Nuts Anaphylaxis     thickened throat     Pineapple Anaphylaxis and Other (See Comments)     Thickening in throat  thickened throat       Black [place] Flavor Other (See Comments)     thickened throat  thickened throat     Liquid Adhesive Other (See Comments)     Tears my skin off     Morphine Itching and Other (See Comments)     Itchiness       Morphine Hcl Itching     Social History:  Tobacco: former, 1.5 PPD x 15 y  Alcohol: No      Family History:  No FH of Cancer     Review of Systems   A 10-point review of systems was performed. Pertinent findings are noted in the HPI.    Imaging/Path/Labs   Imaging: Reviewed     Path: Reviewed     Labs: Reviewed     Assessment    Ms. 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 Most recent surveillance imaging showed evidence of recurrent disease along the course of the sciatic nerve. She has not had RT to this site before.     Plan   In the light of multiply recurrent disease, we recommend a course of radiation therapy for optimal local control. We discussed at length with the patient the  available radiation treatment approaches for treatment of her disease including preoperative and postoperative radiotherapy. We discussed the pros and cons of each approach. Preoperative radiotherapy is utilized for potential down staging/ tumor shrinkage that would facilitate surgical resection ultimately. This approach allows for smaller radiation field size, lower radiation doses, shorter treatment time as well as reduced long-term toxicities particularly fibrosis and edema. However, these advantages are at expense of higher rates of acute wound complications.     On the other hand, postoperative radiotherapy is used for tumors with positive margins (gross or microscopic). This technique is associated with lower surgical complications but with worse long-term side effects. In terms of local control, both options are associated with similar outcomes.     Given her history of repeated surgical resections with high incidence of development of surrounding fibrosis as well as her desire to preserve the sciatic nerve function, we believe that preoperative radiotherapy would be a reasonable option.The treatment plan will be 50 Gy in 25 fractions directed to the tumor and surrounding high risk area of lesion spread.     We discussed the rationale, indications and potential side effects associated with treatment including but not limited to fatigue, local skin reaction, acute wound complications, fibrosis, joint stiffness and edema. At the end of the discussion, the patient agreed with the plan. She will undergo CT simulation on next Monday. She will start her treatment the following week, in coordination with Dr. Reyes.    The patient had many questions during our conversation that were answered to her satisfaction and verbalized understanding. The patient was seen and discussed with staff, Dr. Colin. Thank you for involving us in the care of this patient. Please feel free to contact us with questions or concerns at  any time.    Sean Ayala MD  PGY-4 Resident, Radiation Oncology  [facility]    I saw the patient with the resident.  I agree with the resident note and plan of care.      Savannah Colin MD

## 2021-01-04 ENCOUNTER — VIRTUAL VISIT (OUTPATIENT)
Dept: RADIATION ONCOLOGY | Facility: CLINIC | Age: 71
End: 2021-01-04
Attending: RADIOLOGY
Payer: MEDICARE

## 2021-01-04 DIAGNOSIS — C49.22 LIPOSARCOMA OF LEFT THIGH (H): Primary | ICD-10-CM

## 2021-01-04 ASSESSMENT — ENCOUNTER SYMPTOMS
WEAKNESS: 0
ABDOMINAL PAIN: 0
DEPRESSION: 0
FALLS: 1
BRUISES/BLEEDS EASILY: 0
CLAUDICATION: 0
FEVER: 0
BLURRED VISION: 0
ORTHOPNEA: 0
COUGH: 1
SHORTNESS OF BREATH: 1
VOMITING: 0
HEMATURIA: 0
EYE DISCHARGE: 0
CONSTIPATION: 0
DYSURIA: 0
BLOOD IN STOOL: 0
HEMOPTYSIS: 0
NAUSEA: 0
STRIDOR: 0
FLANK PAIN: 0
LOSS OF CONSCIOUSNESS: 0
FREQUENCY: 0
NECK PAIN: 1
DIZZINESS: 0
PND: 0
TINGLING: 1
POLYDIPSIA: 0
DIARRHEA: 0
NERVOUS/ANXIOUS: 1
TREMORS: 0
SPEECH CHANGE: 0
SEIZURES: 0
HALLUCINATIONS: 0
MYALGIAS: 0
SPUTUM PRODUCTION: 0
FOCAL WEAKNESS: 0
PHOTOPHOBIA: 0
CHILLS: 0
BACK PAIN: 0
PALPITATIONS: 0
SORE THROAT: 0
INSOMNIA: 0
SINUS PAIN: 0
DIAPHORESIS: 0
EYE PAIN: 0
WEIGHT LOSS: 0
EYE REDNESS: 0
SENSORY CHANGE: 0
MEMORY LOSS: 0
WHEEZING: 0
DOUBLE VISION: 0
HEADACHES: 0
HEARTBURN: 1

## 2021-01-04 ASSESSMENT — LIFESTYLE VARIABLES: SUBSTANCE_ABUSE: 0

## 2021-01-04 NOTE — LETTER
2021         RE: Janey Bo  [address] [facility] [place] [address]        Dear Colleague,    Thank you for referring your patient, Janey Bo, to the [facility] RADIATION ONCOLOGY. Please see a copy of my visit note below.    Janey Bo is a 70 year old female who is being evaluated via a billable video visit.      How would you like to obtain your AVS? MyChart  If the video visit is dropped, the invitation should be resent by: Text to cell phone: [phone]  Will anyone else be joining your video visit? No    Video Start Time: 1:10 pm      Video-Visit Details    Type of service:  Video Visit    Video End Time:1:50    Originating Location (pt. Location): Home    Distant Location (provider location):  [facility] RADIATION [facility]COLOGY     Platform used for Video Visit: Ashwin       Department of Radiation Oncology  [address] [id number] [phone]       Consultation Note    Name: Janey Bo MRN: [id number]   : 1950   Date of Service: 2021 Referring: Dr. 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     History of Present Illness   Ms. 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      She was initially diagnosed with atypical lipomatous tumor (ALT) of the left posterior thigh in  when she presented with an enlarging posterior left thigh mass over the last two years. MRI left femur on 2014 demonstrated an abnormal fatty soft tissue mass of the posterior left thigh measuring 20 x 9.6 x 5.8 cm abutting the sciatic nerve with fat infiltrating amongst  fascicles of the nerve, concerning for liposarcoma. Fatty atrophy of the hamstrings musculature was noted. A staging CT CAP on 10/1/2014 showed no evidence of metastatic disease. On 11/7/2014, she underwent excision of the left posterior thigh mass by Dr. Reyes. 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     Since then, she has been under follow-up with Dr. Reyes. Her clinical exam showed a normal sciatic nerve function.  A surveillance MRI scan on 8/14/2015 showed a focal lobulated area close to the sciatic nerve with fat infiltrating between the nerve fascicles. A subsequent MRI on 4/26/2016 demonstrated interval enlargement of the posterior upper thigh soft tissue mass abutting the sciatic nerve measuring 3.1 x 2.2 x 4.7 cm (compared to 1.8 x 1.4 x 3.3 cm), concerning for residual versus recurrent tumor.    On 1/20/2017, she underwent reexcision of the posterior left thigh mass by Dr. Reyes. Pathology () was consistent with atypical lipomatous tumor, tumor size was 7.1 x 4.5 x 3.1 cm, no evidence of dedifferentiation, margins were positive, negative LVSI.  Adjuvant radiation therapy was discussed with the patient given the high risk of local failure, although she ultimately declined and preferred to proceed with follow-up.  Subsequent surveillance images showed a stable soft tissue mass along the course of sciatic nerve. Most recent CT chest on 7/30/2020 showed no evidence of metastatic disease.      On 12/3/2020, the patient felt a mass in her left thigh which was evident on clinical exam. MRI left femur on 12/15/2020 showed stable to mild increase of size of this soft tissue mass measuring 10 cm in craniocaudal dimensions, consistent with recurrent disease. Dr. Reyes discussed a repeat resection and radiation therapy with the patient. She was referred to us for further discussion of radiation therapy option.    We had a video visit with the patient  earlier today. She was doing okay so far. She reported occasional numbness and muscle aches along her posterior thigh. She occasionally takes Aleve for pain control. She otherwise denied any recent fever, chills, nausea, vomiting, headaches, shortness of breath, urinary or bowel symptoms, weakness, swelling, weight or appetite changes.    Past Medical History:  Past Medical History:   Diagnosis Date     Anxiety      Arrhythmia      Breast cancer (H)      Carpal tunnel syndrome      Chronic osteoarthritis      Depressive disorder      Diabetes (H) 2018     Edema      Fatigue      Gastro-oesophageal reflux disease      Heart murmur      Hyperlipaemia      Migraines      Osteoarthrosis      Palpitations      Pancreatic cancer (H)      Pancreatic disease      PONV (postoperative nausea and vomiting)      PVC's (premature ventricular contractions)      Scoliosis      Shortness of breath      Thyroid disease     Hypothyroidsim     Uncomplicated asthma     Exercise induced.      Past Surgical History:  Past Surgical History:   Procedure Laterality Date     APPENDECTOMY       C HAND/FINGER SURGERY UNLISTED       C STOMACH SURGERY PROCEDURE UNLISTED       CHOLECYSTECTOMY       COLONOSCOPY       EXCISE MASS LOWER EXTREMITY Left 11/7/2014    Procedure: EXCISE MASS LOWER EXTREMITY;  Surgeon: John Reyes MD;  Location: [facility] OR     EXCISE VILLALOBOS'S NEUROMA FOOT       EXCISE SOFT TISSUE TUMOR THIGH Left 1/20/2017    Procedure: EXCISE SOFT TISSUE TUMOR THIGH;  Surgeon: John Reyes MD;  Location: [facility] OR     INNER EAR SURGERY       LUMPECTOMY BREAST       OOPHORECTOMY       partial hysterectomy       thyroidectomy       TONSILLECTOMY & ADENOIDECTOMY       WHIPPLE PROCEDURE       ZZ GASTROSCOPY,FL       Chemotherapy History:  None    Radiation History:  Adjuvant Radiation Therapy for breast cancer at [facility]    Pregnant: No     Implanted Cardiac Devices: No    Medications:  Current Outpatient Medications    Medication     albuterol (PROAIR HFA/PROVENTIL HFA/VENTOLIN HFA) 108 (90 Base) MCG/ACT inhaler     amylase-lipase-protease (CREON 24) 58694-09[address] units CPEP per EC capsule     aspirin 81 MG tablet     biotin 5 MG CAPS     buPROPion (WELLBUTRIN SR) 200 MG 12 hr tablet     butalbital-aspirin-caffeine (FIORINAL) -40 MG capsule     Calcium-Magnesium-Vitamin D (CALCIUM MAGNESIUM PO)     cholecalciferol (VITAMIN D3) 10 mcg (400 units) TABS tablet     DilTIAZem HCl Coated Beads (DILTIAZEM CD PO)     escitalopram (LEXAPRO) 10 MG tablet     levothyroxine (SYNTHROID/LEVOTHROID) 150 MCG tablet     methylphenidate (RITALIN) 10 MG tablet     metroNIDAZOLE (METROGEL) 0.75 % external gel     NAPROXEN SODIUM PO     potassium chloride ER (KLOR-CON M) 20 MEQ CR tablet     Probiotic Product (ALIGN) CAPS     raloxifene (EVISTA) 60 MG tablet     sucralfate (CARAFATE) 1 GM/10ML suspension     tiZANidine (ZANAFLEX) 2 MG tablet     VITAMIN E BLEND PO     No current facility-administered medications for this visit.      Allergies:   Allergies   Allergen Reactions     Nuts Anaphylaxis     thickened throat     Pineapple Anaphylaxis and Other (See Comments)     Thickening in throat  thickened throat       Black Toivola Flavor Other (See Comments)     thickened throat  thickened throat     Liquid Adhesive Other (See Comments)     Tears my skin off     Morphine Itching and Other (See Comments)     Itchiness       Morphine Hcl Itching     Social History:  Tobacco: former, 1.5 PPD x 15 y  Alcohol: No      Family History:  No FH of Cancer     Review of Systems   A 10-point review of systems was performed. Pertinent findings are noted in the HPI.    Imaging/Path/Labs   Imaging: Reviewed     Path: Reviewed     Labs: Reviewed     Assessment    Ms. 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 Most recent surveillance imaging showed evidence  of recurrent disease along the course of the sciatic nerve. She has not had RT to this site before.     Plan   In the light of multiply recurrent disease, we recommend a course of radiation therapy for optimal local control. We discussed at length with the patient the available radiation treatment approaches for treatment of her disease including preoperative and postoperative radiotherapy. We discussed the pros and cons of each approach. Preoperative radiotherapy is utilized for potential down staging/ tumor shrinkage that would facilitate surgical resection ultimately. This approach allows for smaller radiation field size, lower radiation doses, shorter treatment time as well as reduced long-term toxicities particularly fibrosis and edema. However, these advantages are at expense of higher rates of acute wound complications.     On the other hand, postoperative radiotherapy is used for tumors with positive margins (gross or microscopic). This technique is associated with lower surgical complications but with worse long-term side effects. In terms of local control, both options are associated with similar outcomes.     Given her history of repeated surgical resections with high incidence of development of surrounding fibrosis as well as her desire to preserve the sciatic nerve function, we believe that preoperative radiotherapy would be a reasonable option.The treatment plan will be 50 Gy in 25 fractions directed to the tumor and surrounding high risk area of lesion spread.     We discussed the rationale, indications and potential side effects associated with treatment including but not limited to fatigue, local skin reaction, acute wound complications, fibrosis, joint stiffness and edema. At the end of the discussion, the patient agreed with the plan. She will undergo CT simulation on next Monday. She will start her treatment the following week, in coordination with Dr. Reyes.    The patient had many questions  during our conversation that were answered to her satisfaction and verbalized understanding. The patient was seen and discussed with staff, Dr. Colin. Thank you for involving us in the care of this patient. Please feel free to contact us with questions or concerns at any time.    Sean Ayala MD  PGY-4 Resident, Radiation Oncology  [facility]    I saw the patient with the resident.  I agree with the resident note and plan of care.      Savannah Colin MD          HPI  INITIAL PATIENT ASSESSMENT    Diagnosis: Sarcoma    Prior radiation therapy: See records    Prior chemotherapy: See records    Prior hormonal therapy:No    Pain Eval:  Denies    Psychosocial  Living arrangements: Lives with   Fall Risk: independent   referral needs: Not needed    Advanced Directive: No  Implantable Cardiac Device? No        Review of Systems   Constitutional: Negative for chills, diaphoresis, fever, malaise/fatigue and weight loss.   HENT: Negative for congestion, ear discharge, ear pain, hearing loss, nosebleeds, sinus pain, sore throat and tinnitus.    Eyes: Negative for blurred vision, double vision, photophobia, pain, discharge and redness.   Respiratory: Positive for cough and shortness of breath. Negative for hemoptysis, sputum production, wheezing and stridor.    Cardiovascular: Negative for chest pain, palpitations, orthopnea, claudication, leg swelling and PND.   Gastrointestinal: Positive for heartburn. Negative for abdominal pain, blood in stool, constipation, diarrhea, melena, nausea and vomiting.   Genitourinary: Negative for dysuria, flank pain, frequency, hematuria and urgency.   Musculoskeletal: Positive for falls, joint pain and neck pain. Negative for back pain and myalgias.   Skin: Negative for itching and rash.   Neurological: Positive for tingling. Negative for dizziness, tremors, sensory change, speech change, focal weakness, seizures, loss of  consciousness, weakness and headaches.   Endo/Heme/Allergies: Negative for environmental allergies and polydipsia. Does not bruise/bleed easily.   Psychiatric/Behavioral: Negative for depression, hallucinations, memory loss, substance abuse and suicidal ideas. The patient is nervous/anxious. The patient does not have insomnia.      Again, thank you for allowing me to participate in the care of your patient.        Sincerely,        Savannah Colin MD

## 2021-01-04 NOTE — PROGRESS NOTES
HPI  INITIAL PATIENT ASSESSMENT    Diagnosis: Sarcoma    Prior radiation therapy: See records    Prior chemotherapy: See records    Prior hormonal therapy:No    Pain Eval:  Denies    Psychosocial  Living arrangements: Lives with   Fall Risk: independent   referral needs: Not needed    Advanced Directive: No  Implantable Cardiac Device? No        Review of Systems   Constitutional: Negative for chills, diaphoresis, fever, malaise/fatigue and weight loss.   HENT: Negative for congestion, ear discharge, ear pain, hearing loss, nosebleeds, sinus pain, sore throat and tinnitus.    Eyes: Negative for blurred vision, double vision, photophobia, pain, discharge and redness.   Respiratory: Positive for cough and shortness of breath. Negative for hemoptysis, sputum production, wheezing and stridor.    Cardiovascular: Negative for chest pain, palpitations, orthopnea, claudication, leg swelling and PND.   Gastrointestinal: Positive for heartburn. Negative for abdominal pain, blood in stool, constipation, diarrhea, melena, nausea and vomiting.   Genitourinary: Negative for dysuria, flank pain, frequency, hematuria and urgency.   Musculoskeletal: Positive for falls, joint pain and neck pain. Negative for back pain and myalgias.   Skin: Negative for itching and rash.   Neurological: Positive for tingling. Negative for dizziness, tremors, sensory change, speech change, focal weakness, seizures, loss of consciousness, weakness and headaches.   Endo/Heme/Allergies: Negative for environmental allergies and polydipsia. Does not bruise/bleed easily.   Psychiatric/Behavioral: Negative for depression, hallucinations, memory loss, substance abuse and suicidal ideas. The patient is nervous/anxious. The patient does not have insomnia.

## 2021-01-07 ENCOUNTER — AMBULATORY - HEALTHEAST (OUTPATIENT)
Dept: LAB | Facility: CLINIC | Age: 71
End: 2021-01-07

## 2021-01-07 DIAGNOSIS — Z11.59 SCREENING FOR VIRAL DISEASE: ICD-10-CM

## 2021-01-08 ENCOUNTER — COMMUNICATION - HEALTHEAST (OUTPATIENT)
Dept: SCHEDULING | Facility: CLINIC | Age: 71
End: 2021-01-08

## 2021-01-08 LAB
SARS-COV-2 PCR COMMENT: NORMAL
SARS-COV-2 RNA SPEC QL NAA+PROBE: NEGATIVE
SARS-COV-2 VIRUS SPECIMEN SOURCE: NORMAL

## 2021-01-11 ENCOUNTER — APPOINTMENT (OUTPATIENT)
Dept: RADIATION ONCOLOGY | Facility: CLINIC | Age: 71
End: 2021-01-11
Attending: RADIOLOGY
Payer: MEDICARE

## 2021-01-11 DIAGNOSIS — C49.22 LIPOSARCOMA OF LEFT THIGH (H): Primary | ICD-10-CM

## 2021-01-11 PROCEDURE — 77334 RADIATION TREATMENT AID(S): CPT | Mod: 26 | Performed by: RADIOLOGY

## 2021-01-11 PROCEDURE — 77334 RADIATION TREATMENT AID(S): CPT | Performed by: RADIOLOGY

## 2021-01-11 NOTE — LETTER
1/11/2021         RE: Janey Bo  [address] [facility] [place] [address]        Dear Colleague,    Thank you for referring your patient, Janey Bo, to the [facility] RADIATION [facility]COLOG[facility]. Please see a copy of my visit note below.    Radiation Therapy Patient Education    Person involved with teaching: Patient    Patient educational needs for self management of treatment-related side effects assessment completed.  EPIC Patient Ed tab contains Patient Learning Assessment    Education Materials Given  Radiation Therapy and You    Educational Topics Discussed  Side effects expected, Pain management, Skin care and When to call MD/RN    Response To Teaching  Verbalizes understanding    GYN Only  Vaginal Dilator-given and educated: N/A    Referrals sent: None    Chemotherapy?  No          Again, thank you for allowing me to participate in the care of your patient.        Sincerely,        Savannah Colin MD

## 2021-01-14 ENCOUNTER — HEALTH MAINTENANCE LETTER (OUTPATIENT)
Age: 71
End: 2021-01-14

## 2021-01-18 NOTE — PROGRESS NOTES
[facility] PHYSICIANS  SPECIALIZING IN BREAKTHROUGHS  Radiation Oncology     On Treatment Visit Note        Viv Bo                                                                  Date: 2021          MRN: [id number]  : 1950     Reason for Visit:  On Radiation Treatment Visit      Diagnosis: Recurrent atypical lipomatous tumor of the left thigh     Plan for preoperative radiation therapy      Treatment Summary to Date  Site Treated: Left thigh Current Dose: 200Gy/5000cGy Fraction:       Subjective: Ms. Bo presents today for her first weekly on treatment visit.  She tolerated her first treatment with no issues or concerns.    Hospitalization: None     Missed Treatments: None     Objective:   Gen: A/Ox3. NAD  Skin: No erythema   Psych: Appropriate mood and affect     Toxicities:  None     Labs: No interval labs     Assessment: Ms. 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  Her most recent surveillance scan showed evidence of disease recurrence. She is currently undergoing neoadjuvant radiation therapy.     Tolerating radiation therapy well.  All questions and concerns addressed.     Mosaiq chart and setup information reviewed  MVCT/IGRT images checked     Medication Review  Med list reviewed with patient?: Yes     Educational Topic Discussed  Education Instructions: reviewed     Plan:   1. Continue current therapy.       Sean Ayala MD  PGY-4 Resident, Radiation Oncology  [facility]     I saw the patient with the resident.  I agree with the resident note and plan of care.      Savannah Colin MD

## 2021-01-19 ENCOUNTER — APPOINTMENT (OUTPATIENT)
Dept: RADIATION ONCOLOGY | Facility: CLINIC | Age: 71
End: 2021-01-19
Attending: RADIOLOGY
Payer: MEDICARE

## 2021-01-19 VITALS
WEIGHT: 177.9 LBS | HEART RATE: 73 BPM | SYSTOLIC BLOOD PRESSURE: 121 MMHG | DIASTOLIC BLOOD PRESSURE: 71 MMHG | BODY MASS INDEX: 34.03 KG/M2 | OXYGEN SATURATION: 97 % | RESPIRATION RATE: 16 BRPM

## 2021-01-19 DIAGNOSIS — C49.22 LIPOSARCOMA OF LEFT THIGH (H): Primary | ICD-10-CM

## 2021-01-19 PROCEDURE — 77014 PR CT GUIDE FOR PLACEMENT RADIATION THERAPY FIELDS: CPT | Mod: 26 | Performed by: RADIOLOGY

## 2021-01-19 PROCEDURE — 77386 HC IMRT TREATMENT DELIVERY, COMPLEX: CPT | Performed by: RADIOLOGY

## 2021-01-19 ASSESSMENT — PAIN SCALES - GENERAL: PAINLEVEL: NO PAIN (0)

## 2021-01-19 NOTE — LETTER
2021         RE: Janey Bo  [address] [facility] [place] [address]        Dear Colleague,    Thank you for referring your patient, Janey Bo, to the [facility] RADIATION ONCOLOG[facility]. Please see a copy of my visit note below.    [facility] PHYSICIANS  SPECIALIZING IN BREAKTHROUGHS  Radiation Oncology     On Treatment Visit Note        Viv Bo                                                                  Date: 2021          MRN: [id number]  : 1950     Reason for Visit:  On Radiation Treatment Visit      Diagnosis: Recurrent atypical lipomatous tumor of the left thigh     Plan for preoperative radiation therapy      Treatment Summary to Date  Site Treated: Left thigh Current Dose: 200Gy/5000cGy Fraction:       Subjective: Ms. Bo presents today for her first weekly on treatment visit.  She tolerated her first treatment with no issues or concerns.    Hospitalization: None     Missed Treatments: None     Objective:   Gen: A/Ox3. NAD  Skin: No erythema   Psych: Appropriate mood and affect     Toxicities:  None     Labs: No interval labs     Assessment: Ms. 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  Her most recent surveillance scan showed evidence of disease recurrence. She is currently undergoing neoadjuvant radiation therapy.     Tolerating radiation therapy well.  All questions and concerns addressed.     Mosaiq chart and setup information reviewed  MVCT/IGRT images checked     Medication Review  Med list reviewed with patient?: Yes     Educational Topic Discussed  Education Instructions: reviewed     Plan:   1. Continue current therapy.       Sean Ayala MD  PGY-4 Resident, Radiation Oncology  [facility]     I saw the patient with the resident.  I agree with the resident note and  plan of care.      Savannah Colin MD

## 2021-01-20 ENCOUNTER — APPOINTMENT (OUTPATIENT)
Dept: RADIATION ONCOLOGY | Facility: CLINIC | Age: 71
End: 2021-01-20
Attending: RADIOLOGY
Payer: MEDICARE

## 2021-01-20 PROCEDURE — 77014 PR CT GUIDE FOR PLACEMENT RADIATION THERAPY FIELDS: CPT | Mod: 26 | Performed by: RADIOLOGY

## 2021-01-20 PROCEDURE — 77386 HC IMRT TREATMENT DELIVERY, COMPLEX: CPT | Performed by: RADIOLOGY

## 2021-01-21 ENCOUNTER — APPOINTMENT (OUTPATIENT)
Dept: RADIATION ONCOLOGY | Facility: CLINIC | Age: 71
End: 2021-01-21
Attending: RADIOLOGY
Payer: MEDICARE

## 2021-01-21 PROCEDURE — 77014 PR CT GUIDE FOR PLACEMENT RADIATION THERAPY FIELDS: CPT | Mod: 26 | Performed by: RADIOLOGY

## 2021-01-21 PROCEDURE — 77386 HC IMRT TREATMENT DELIVERY, COMPLEX: CPT | Performed by: RADIOLOGY

## 2021-01-22 ENCOUNTER — APPOINTMENT (OUTPATIENT)
Dept: RADIATION ONCOLOGY | Facility: CLINIC | Age: 71
End: 2021-01-22
Attending: RADIOLOGY
Payer: MEDICARE

## 2021-01-22 PROCEDURE — 77386 HC IMRT TREATMENT DELIVERY, COMPLEX: CPT | Performed by: RADIOLOGY

## 2021-01-22 PROCEDURE — 77014 PR CT GUIDE FOR PLACEMENT RADIATION THERAPY FIELDS: CPT | Mod: 26 | Performed by: RADIOLOGY

## 2021-01-23 NOTE — PROGRESS NOTES
[facility] PHYSICIANS  SPECIALIZING IN BREAKTHROUGHS  Radiation Oncology     On Treatment Visit Note        Viv Bo                                                                  Date: 2021          MRN: [id number]  : 1950     Reason for Visit:  On Radiation Treatment Visit      Diagnosis: Recurrent atypical lipomatous tumor of the left thigh     Plan for preoperative radiation therapy      Treatment Summary to Date  Site Treated: Left thigh Current Dose: 1000Gy/5000cGy Fraction:       Subjective: Ms. Bo presents today for her weekly on treatment visit.  She continues to tolerate her  treatment with no issues or concerns.     Hospitalization: None     Missed Treatments: None     Objective:   Gen: A/Ox3. NAD  Skin: No erythema   Psych: Appropriate mood and affect     Toxicities:  None     Labs: No interval labs     Assessment: Ms. 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  Her most recent surveillance scan showed evidence of disease recurrence. She is currently undergoing  neoadjuvant radiation therapy.     Tolerating radiation therapy well.  All questions and concerns addressed.     Mosaiq chart and setup information reviewed  MVCT/IGRT images checked     Medication Review  Med list reviewed with patient?: Yes     Educational Topic Discussed  Education Instructions: reviewed     Plan:   1. Continue current therapy.  2. Use of skin moisturizers for skin care        Sean Ayala MD  PGY-4 Resident, Radiation Oncology  [facility]     I saw the patient with the resident.  I agree with the resident note and plan of care.      Savannah Colin MD

## 2021-01-25 ENCOUNTER — APPOINTMENT (OUTPATIENT)
Dept: RADIATION ONCOLOGY | Facility: CLINIC | Age: 71
End: 2021-01-25
Attending: RADIOLOGY
Payer: MEDICARE

## 2021-01-25 VITALS — WEIGHT: 178.3 LBS | DIASTOLIC BLOOD PRESSURE: 77 MMHG | BODY MASS INDEX: 34.1 KG/M2 | SYSTOLIC BLOOD PRESSURE: 135 MMHG

## 2021-01-25 DIAGNOSIS — C49.22 LIPOSARCOMA OF LEFT THIGH (H): Primary | ICD-10-CM

## 2021-01-25 PROCEDURE — 77427 RADIATION TX MANAGEMENT X5: CPT | Performed by: RADIOLOGY

## 2021-01-25 PROCEDURE — 77336 RADIATION PHYSICS CONSULT: CPT | Performed by: RADIOLOGY

## 2021-01-25 PROCEDURE — 77386 HC IMRT TREATMENT DELIVERY, COMPLEX: CPT | Performed by: RADIOLOGY

## 2021-01-25 NOTE — LETTER
2021         RE: Janey Bo  [address] [facility] [place] [address]        Dear Colleague,    Thank you for referring your patient, Janey Bo, to the [facility] RADIATION ONCOLOGY. Please see a copy of my visit note below.    [facility] PHYSICIANS  SPECIALIZING IN BREAKTHROUGHS  Radiation Oncology     On Treatment Visit Note        Viv Bo                                                                  Date: 2021          MRN: [id number]  : 1950     Reason for Visit:  On Radiation Treatment Visit      Diagnosis: Recurrent atypical lipomatous tumor of the left thigh     Plan for preoperative radiation therapy      Treatment Summary to Date  Site Treated: Left thigh Current Dose: 1000Gy/5000cGy Fraction:       Subjective: Ms. Bo presents today for her weekly on treatment visit.  She continues to tolerate her  treatment with no issues or concerns.     Hospitalization: None     Missed Treatments: None     Objective:   Gen: A/Ox3. NAD  Skin: No erythema   Psych: Appropriate mood and affect     Toxicities:  None     Labs: No interval labs     Assessment: Ms. 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  Her most recent surveillance scan showed evidence of disease recurrence. She is currently undergoing  neoadjuvant radiation therapy.     Tolerating radiation therapy well.  All questions and concerns addressed.     Mosaiq chart and setup information reviewed  MVCT/IGRT images checked     Medication Review  Med list reviewed with patient?: Yes     Educational Topic Discussed  Education Instructions: reviewed     Plan:   1. Continue current therapy.  2. Use of skin moisturizers for skin care        Sean Ayala MD  PGY-4 Resident, Radiation Oncology  [facility]     I saw the patient with the  resident.  I agree with the resident note and plan of care.      Savannah Colin MD

## 2021-01-26 ENCOUNTER — APPOINTMENT (OUTPATIENT)
Dept: RADIATION ONCOLOGY | Facility: CLINIC | Age: 71
End: 2021-01-26
Attending: RADIOLOGY
Payer: MEDICARE

## 2021-01-26 PROCEDURE — 77014 PR CT GUIDE FOR PLACEMENT RADIATION THERAPY FIELDS: CPT | Mod: 26 | Performed by: RADIOLOGY

## 2021-01-26 PROCEDURE — 77386 HC IMRT TREATMENT DELIVERY, COMPLEX: CPT | Performed by: RADIOLOGY

## 2021-01-27 ENCOUNTER — APPOINTMENT (OUTPATIENT)
Dept: RADIATION ONCOLOGY | Facility: CLINIC | Age: 71
End: 2021-01-27
Attending: RADIOLOGY
Payer: MEDICARE

## 2021-01-27 PROCEDURE — 77014 PR CT GUIDE FOR PLACEMENT RADIATION THERAPY FIELDS: CPT | Mod: 26 | Performed by: RADIOLOGY

## 2021-01-27 PROCEDURE — 77386 HC IMRT TREATMENT DELIVERY, COMPLEX: CPT | Performed by: RADIOLOGY

## 2021-01-28 ENCOUNTER — APPOINTMENT (OUTPATIENT)
Dept: RADIATION ONCOLOGY | Facility: CLINIC | Age: 71
End: 2021-01-28
Attending: RADIOLOGY
Payer: MEDICARE

## 2021-01-28 PROCEDURE — 77014 PR CT GUIDE FOR PLACEMENT RADIATION THERAPY FIELDS: CPT | Mod: 26 | Performed by: RADIOLOGY

## 2021-01-28 PROCEDURE — 77386 HC IMRT TREATMENT DELIVERY, COMPLEX: CPT | Performed by: RADIOLOGY

## 2021-01-29 ENCOUNTER — APPOINTMENT (OUTPATIENT)
Dept: RADIATION ONCOLOGY | Facility: CLINIC | Age: 71
End: 2021-01-29
Attending: RADIOLOGY
Payer: MEDICARE

## 2021-01-29 PROCEDURE — 77014 PR CT GUIDE FOR PLACEMENT RADIATION THERAPY FIELDS: CPT | Mod: 26 | Performed by: RADIOLOGY

## 2021-01-29 PROCEDURE — 77386 HC IMRT TREATMENT DELIVERY, COMPLEX: CPT | Performed by: RADIOLOGY

## 2021-01-31 NOTE — PROGRESS NOTES
[facility] PHYSICIANS  SPECIALIZING IN BREAKTHROUGHS  Radiation Oncology     On Treatment Visit Note        Viv Bo                                                                  Date: 2021          MRN: [id number]  : 1950     Reason for Visit:  On Radiation Treatment Visit      Diagnosis: Recurrent atypical lipomatous tumor of the left thigh     Plan for preoperative radiation therapy      Treatment Summary to Date  Site Treated: Left thigh Current Dose: 2000Gy/5000cGy Fraction: 10/25      Subjective: Ms. Bo presents today for her weekly on treatment visit.  She continues to tolerate her  treatment with no issues or concerns.  She reports mild localized skin irritation that is manageable at this point.     Hospitalization: None     Missed Treatments: None     Objective:   Gen: A/Ox3. NAD  Skin: No erythema   Psych: Appropriate mood and affect     Toxicities:  None     Labs: No interval labs     Assessment: Ms. 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  Her most recent surveillance scan showed evidence of disease recurrence. She is currently undergoing  neoadjuvant radiation therapy.     Tolerating radiation therapy well.  All questions and concerns addressed.     Mosaiq chart and setup information reviewed  MVCT/IGRT images checked     Medication Review  Med list reviewed with patient?: Yes     Educational Topic Discussed  Education Instructions: reviewed     Plan:   1. Continue current therapy.  2. Use of skin moisturizers for skin care  3. Skin check during treatment on next Monday, 2021        Sean Aylaa MD  PGY-4 Resident, Radiation Oncology  [facility]     I saw the patient with the resident.  I agree with the resident note and plan of care.      Savannah Colin MD

## 2021-02-01 ENCOUNTER — OFFICE VISIT (OUTPATIENT)
Dept: RADIATION ONCOLOGY | Facility: CLINIC | Age: 71
End: 2021-02-01
Attending: RADIOLOGY
Payer: MEDICARE

## 2021-02-01 VITALS
HEART RATE: 77 BPM | WEIGHT: 180.4 LBS | SYSTOLIC BLOOD PRESSURE: 132 MMHG | DIASTOLIC BLOOD PRESSURE: 74 MMHG | BODY MASS INDEX: 34.5 KG/M2

## 2021-02-01 DIAGNOSIS — C49.22 LIPOSARCOMA OF LEFT THIGH (H): Primary | ICD-10-CM

## 2021-02-01 PROCEDURE — 77336 RADIATION PHYSICS CONSULT: CPT | Performed by: RADIOLOGY

## 2021-02-01 PROCEDURE — 77427 RADIATION TX MANAGEMENT X5: CPT | Performed by: RADIOLOGY

## 2021-02-01 PROCEDURE — 77386 HC IMRT TREATMENT DELIVERY, COMPLEX: CPT | Performed by: RADIOLOGY

## 2021-02-01 NOTE — LETTER
2021         RE: Janey Bo  [address] [facility] [address]        Dear Colleague,    Thank you for referring your patient, Janey Bo, to the [facility] RADIATION ONCOLOGY. Please see a copy of my visit note below.    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 Bo presents today for her weekly on treatment visit.  She continues to tolerate her  treatment with no issues or concerns.  She reports mild localized skin irritation that is manageable at this point.     Hospitalization: None     Missed Treatments: None     Objective:   Gen: A/Ox3. NAD  Skin: No erythema   Psych: Appropriate mood and affect     Toxicities:  None     Labs: No interval labs     Assessment: Ms. 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  Her most recent surveillance scan showed evidence of disease recurrence. She is currently undergoing  neoadjuvant radiation therapy.     Tolerating radiation therapy well.  All questions and concerns addressed.     Mosaiq chart and setup information reviewed  MVCT/IGRT images checked     Medication Review  Med list reviewed with patient?: Yes     Educational Topic Discussed  Education Instructions: reviewed     Plan:   1. Continue current therapy.  2. Use of skin moisturizers for skin care  3. Skin check during treatment on next Monday,  2/8/2021        Sean Ayala MD  PGY-4 Resident, Radiation Oncology  [facility]     I saw the patient with the resident.  I agree with the resident note and plan of care.      Savannah Colin MD

## 2021-02-02 ENCOUNTER — APPOINTMENT (OUTPATIENT)
Dept: RADIATION ONCOLOGY | Facility: CLINIC | Age: 71
End: 2021-02-02
Attending: RADIOLOGY
Payer: MEDICARE

## 2021-02-02 PROCEDURE — 77386 HC IMRT TREATMENT DELIVERY, COMPLEX: CPT | Performed by: RADIOLOGY

## 2021-02-02 PROCEDURE — 77014 PR CT GUIDE FOR PLACEMENT RADIATION THERAPY FIELDS: CPT | Mod: 26 | Performed by: RADIOLOGY

## 2021-02-03 ENCOUNTER — APPOINTMENT (OUTPATIENT)
Dept: RADIATION ONCOLOGY | Facility: CLINIC | Age: 71
End: 2021-02-03
Attending: RADIOLOGY
Payer: MEDICARE

## 2021-02-03 PROCEDURE — 77386 HC IMRT TREATMENT DELIVERY, COMPLEX: CPT | Performed by: RADIOLOGY

## 2021-02-03 PROCEDURE — 77014 PR CT GUIDE FOR PLACEMENT RADIATION THERAPY FIELDS: CPT | Mod: 26 | Performed by: RADIOLOGY

## 2021-02-04 ENCOUNTER — APPOINTMENT (OUTPATIENT)
Dept: RADIATION ONCOLOGY | Facility: CLINIC | Age: 71
End: 2021-02-04
Attending: RADIOLOGY
Payer: MEDICARE

## 2021-02-04 PROCEDURE — 77386 HC IMRT TREATMENT DELIVERY, COMPLEX: CPT | Performed by: RADIOLOGY

## 2021-02-04 PROCEDURE — 77014 PR CT GUIDE FOR PLACEMENT RADIATION THERAPY FIELDS: CPT | Mod: 26 | Performed by: RADIOLOGY

## 2021-02-05 ENCOUNTER — APPOINTMENT (OUTPATIENT)
Dept: RADIATION ONCOLOGY | Facility: CLINIC | Age: 71
End: 2021-02-05
Attending: RADIOLOGY
Payer: MEDICARE

## 2021-02-05 PROCEDURE — 77014 PR CT GUIDE FOR PLACEMENT RADIATION THERAPY FIELDS: CPT | Mod: 26 | Performed by: RADIOLOGY

## 2021-02-05 PROCEDURE — 77386 HC IMRT TREATMENT DELIVERY, COMPLEX: CPT | Performed by: RADIOLOGY

## 2021-02-07 NOTE — PROGRESS NOTES
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 Bo presents today for her weekly on treatment visit.  She continues to tolerate her  treatment well. She reports mild localized skin irritation and edema that are manageable at this point.     Hospitalization: None     Missed Treatments: None     Objective:   Gen: A/Ox3. NAD  Skin: No evidence of erythema or swelling  Psych: Appropriate mood and affect     Toxicities:  None     Labs: No interval labs     Assessment: Ms. 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  Her most recent surveillance scan showed evidence of disease recurrence. She is currently undergoing  neoadjuvant radiation therapy.     Tolerating radiation therapy well.  All questions and concerns addressed.     Mosaiq chart and setup information reviewed  MVCT/IGRT images checked     Medication Review  Med list reviewed with patient?: Yes     Educational Topic Discussed  Education Instructions: reviewed     Plan:   1. Continue current therapy.  2. Use of Aquaphor for skin care  3. Use of pain medications as needed        Sean Ayala MD  PGY-4 Resident, Radiation Oncology  [facility]     I saw the patient with the resident.  I agree with the resident note and plan of care.      Savannah Colin MD

## 2021-02-08 ENCOUNTER — OFFICE VISIT (OUTPATIENT)
Dept: RADIATION ONCOLOGY | Facility: CLINIC | Age: 71
End: 2021-02-08
Attending: RADIOLOGY
Payer: MEDICARE

## 2021-02-08 VITALS
HEART RATE: 74 BPM | DIASTOLIC BLOOD PRESSURE: 74 MMHG | WEIGHT: 177 LBS | SYSTOLIC BLOOD PRESSURE: 129 MMHG | BODY MASS INDEX: 33.85 KG/M2

## 2021-02-08 DIAGNOSIS — C49.22 LIPOSARCOMA OF LEFT THIGH (H): Primary | ICD-10-CM

## 2021-02-08 PROCEDURE — 77336 RADIATION PHYSICS CONSULT: CPT | Performed by: RADIOLOGY

## 2021-02-08 PROCEDURE — 77427 RADIATION TX MANAGEMENT X5: CPT | Performed by: RADIOLOGY

## 2021-02-08 PROCEDURE — 77386 HC IMRT TREATMENT DELIVERY, COMPLEX: CPT | Performed by: RADIOLOGY

## 2021-02-08 NOTE — LETTER
2021         RE: Janey Bo  [address] [facility] [address]        Dear Colleague,    Thank you for referring your patient, Janey Bo, to the [facility] RADIATION ONCOLOGY. Please see a copy of my visit note below.    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 Bo presents today for her weekly on treatment visit.  She continues to tolerate her  treatment well. She reports mild localized skin irritation and edema that are manageable at this point.     Hospitalization: None     Missed Treatments: None     Objective:   Gen: A/Ox3. NAD  Skin: No evidence of erythema or swelling  Psych: Appropriate mood and affect     Toxicities:  None     Labs: No interval labs     Assessment: Ms. 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  Her most recent surveillance scan showed evidence of disease recurrence. She is currently undergoing  neoadjuvant radiation therapy.     Tolerating radiation therapy well.  All questions and concerns addressed.     Mosaiq chart and setup information reviewed  MVCT/IGRT images checked     Medication Review  Med list reviewed with patient?: Yes     Educational Topic Discussed  Education Instructions: reviewed     Plan:   1. Continue current therapy.  2. Use of Aquaphor for skin care  3. Use of pain medications as needed        Sean  MD Lucy  PGY-4 Resident, Radiation Oncology  [facility]     I saw the patient with the resident.  I agree with the resident note and plan of care.      Savannah Colin MD

## 2021-02-09 ENCOUNTER — APPOINTMENT (OUTPATIENT)
Dept: RADIATION ONCOLOGY | Facility: CLINIC | Age: 71
End: 2021-02-09
Attending: RADIOLOGY
Payer: MEDICARE

## 2021-02-09 PROCEDURE — 77386 HC IMRT TREATMENT DELIVERY, COMPLEX: CPT | Performed by: RADIOLOGY

## 2021-02-09 PROCEDURE — 99207 PR SATISFY VISIT NUMBER: CPT | Performed by: RADIOLOGY

## 2021-02-10 ENCOUNTER — APPOINTMENT (OUTPATIENT)
Dept: RADIATION ONCOLOGY | Facility: CLINIC | Age: 71
End: 2021-02-10
Attending: RADIOLOGY
Payer: MEDICARE

## 2021-02-10 PROCEDURE — 77386 HC IMRT TREATMENT DELIVERY, COMPLEX: CPT | Performed by: RADIOLOGY

## 2021-02-10 PROCEDURE — 77014 PR CT GUIDE FOR PLACEMENT RADIATION THERAPY FIELDS: CPT | Mod: 26 | Performed by: RADIOLOGY

## 2021-02-11 ENCOUNTER — APPOINTMENT (OUTPATIENT)
Dept: RADIATION ONCOLOGY | Facility: CLINIC | Age: 71
End: 2021-02-11
Attending: RADIOLOGY
Payer: MEDICARE

## 2021-02-11 PROCEDURE — 77386 HC IMRT TREATMENT DELIVERY, COMPLEX: CPT | Performed by: RADIOLOGY

## 2021-02-11 PROCEDURE — 77014 PR CT GUIDE FOR PLACEMENT RADIATION THERAPY FIELDS: CPT | Mod: 26 | Performed by: RADIOLOGY

## 2021-02-12 ENCOUNTER — APPOINTMENT (OUTPATIENT)
Dept: RADIATION ONCOLOGY | Facility: CLINIC | Age: 71
End: 2021-02-12
Attending: RADIOLOGY
Payer: MEDICARE

## 2021-02-12 PROCEDURE — 77386 HC IMRT TREATMENT DELIVERY, COMPLEX: CPT | Performed by: RADIOLOGY

## 2021-02-12 PROCEDURE — 77014 PR CT GUIDE FOR PLACEMENT RADIATION THERAPY FIELDS: CPT | Mod: 26 | Performed by: RADIOLOGY

## 2021-02-14 NOTE — PROGRESS NOTES
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 Bo presents today for her weekly on treatment visit.  She continues to tolerate her treatment well. She reports mild fatigue, localized skin irritation and edema that are manageable at this point.     Hospitalization: None     Missed Treatments: 2/15/2021: bad weather      Objective:   Gen: A/Ox3. NAD  Skin: No evidence of erythema or swelling  Psych: Appropriate mood and affect     Toxicities:  Fatigue; grade I  Edema: grade I     Labs: No interval labs     Assessment: Ms. 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 She is currently undergoing neoadjuvant radiation therapy.     Tolerating radiation therapy well.  All questions and concerns addressed.     Mosaiq chart and setup information reviewed  MVCT/IGRT images checked     Medication Review  Med list reviewed with patient?: Yes     Educational Topic Discussed  Education Instructions: reviewed     Plan:   1. Continue current therapy.  2. Use of Aquaphor for skin care  3. Use of pain medications as needed        Sean Ayala MD  PGY-4 Resident, Radiation Oncology  [facility]     I saw the patient with the resident.  I agree with the resident note and plan of care.      Savannah Colin MD

## 2021-02-16 ENCOUNTER — APPOINTMENT (OUTPATIENT)
Dept: RADIATION ONCOLOGY | Facility: CLINIC | Age: 71
End: 2021-02-16
Attending: RADIOLOGY
Payer: MEDICARE

## 2021-02-16 ENCOUNTER — TELEPHONE (OUTPATIENT)
Dept: ONCOLOGY | Facility: CLINIC | Age: 71
End: 2021-02-16

## 2021-02-16 VITALS — WEIGHT: 176 LBS | BODY MASS INDEX: 33.66 KG/M2

## 2021-02-16 DIAGNOSIS — C49.22 LIPOSARCOMA OF LEFT THIGH (H): Primary | ICD-10-CM

## 2021-02-16 PROCEDURE — 77427 RADIATION TX MANAGEMENT X5: CPT | Performed by: RADIOLOGY

## 2021-02-16 PROCEDURE — 77336 RADIATION PHYSICS CONSULT: CPT | Performed by: RADIOLOGY

## 2021-02-16 PROCEDURE — 77386 HC IMRT TREATMENT DELIVERY, COMPLEX: CPT | Performed by: RADIOLOGY

## 2021-02-16 NOTE — LETTER
2021         RE: Janey Bo  [address] [facility] [address]        Dear Colleague,    Thank you for referring your patient, Janey Bo, to the [facility] RADIATION ONCOLOGY. Please see a copy of my visit note below.    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 Bo presents today for her weekly on treatment visit.  She continues to tolerate her treatment well. She reports mild fatigue, localized skin irritation and edema that are manageable at this point.     Hospitalization: None     Missed Treatments: 2/15/2021: bad weather      Objective:   Gen: A/Ox3. NAD  Skin: No evidence of erythema or swelling  Psych: Appropriate mood and affect     Toxicities:  Fatigue; grade I  Edema: grade I     Labs: No interval labs     Assessment: Ms. 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 She is currently undergoing neoadjuvant radiation therapy.     Tolerating radiation therapy well.  All questions and concerns addressed.     Mosaiq chart and setup information reviewed  MVCT/IGRT images checked     Medication Review  Med list reviewed with patient?: Yes     Educational Topic Discussed  Education Instructions: reviewed     Plan:   1. Continue current therapy.  2. Use of Aquaphor for skin care  3. Use of pain medications as  needed        Sean Ayala MD  PGY-4 Resident, Radiation Oncology  [facility]     I saw the patient with the resident.  I agree with the resident note and plan of care.      Savannah Colin MD

## 2021-02-16 NOTE — PROGRESS NOTES
Nutrition Services:    Called Cori per her request from oncology distress screening.  She was not able to talk at this time, however, requested RD to call back in the afternoons on a Thursday.  RD will attempt to call back on a Thursday per her request.     Sommer Asif RD, [facility] [place]  [phone]

## 2021-02-17 ENCOUNTER — APPOINTMENT (OUTPATIENT)
Dept: RADIATION ONCOLOGY | Facility: CLINIC | Age: 71
End: 2021-02-17
Attending: RADIOLOGY
Payer: MEDICARE

## 2021-02-17 PROCEDURE — 77386 HC IMRT TREATMENT DELIVERY, COMPLEX: CPT | Performed by: RADIOLOGY

## 2021-02-17 PROCEDURE — 77014 PR CT GUIDE FOR PLACEMENT RADIATION THERAPY FIELDS: CPT | Mod: 26 | Performed by: RADIOLOGY

## 2021-02-18 ENCOUNTER — APPOINTMENT (OUTPATIENT)
Dept: RADIATION ONCOLOGY | Facility: CLINIC | Age: 71
End: 2021-02-18
Attending: RADIOLOGY
Payer: MEDICARE

## 2021-02-18 ENCOUNTER — TELEPHONE (OUTPATIENT)
Dept: ORTHOPEDICS | Facility: CLINIC | Age: 71
End: 2021-02-18

## 2021-02-18 PROCEDURE — 77014 PR CT GUIDE FOR PLACEMENT RADIATION THERAPY FIELDS: CPT | Mod: 26 | Performed by: RADIOLOGY

## 2021-02-18 PROCEDURE — 77386 HC IMRT TREATMENT DELIVERY, COMPLEX: CPT | Performed by: RADIOLOGY

## 2021-02-18 NOTE — TELEPHONE ENCOUNTER
This RN reached to Cori regarding scheduling follow up imaging and appointment with Dr. Reyes.    This RN asked Cori to call back and discuss how this RN can assist with scheduling her appointments.

## 2021-02-19 ENCOUNTER — TELEPHONE (OUTPATIENT)
Dept: ORTHOPEDICS | Facility: CLINIC | Age: 71
End: 2021-02-19

## 2021-02-19 ENCOUNTER — APPOINTMENT (OUTPATIENT)
Dept: RADIATION ONCOLOGY | Facility: CLINIC | Age: 71
End: 2021-02-19
Attending: RADIOLOGY
Payer: MEDICARE

## 2021-02-19 DIAGNOSIS — C49.22: Primary | ICD-10-CM

## 2021-02-19 PROCEDURE — 77014 PR CT GUIDE FOR PLACEMENT RADIATION THERAPY FIELDS: CPT | Mod: 26 | Performed by: RADIOLOGY

## 2021-02-19 PROCEDURE — 77386 HC IMRT TREATMENT DELIVERY, COMPLEX: CPT | Performed by: RADIOLOGY

## 2021-02-19 NOTE — TELEPHONE ENCOUNTER
This RN reached out to Cori to introduce myself as Dr. Reyes's nurse.    This RN confirmed with Cori that her Radiation Therapy will be done on Tues Feb 23rd.    This RN discussed that Cori will need MRI's done, follow up visit with Dr. Reyes, and plan for procedure within 4 wks of finishing the radiation, approximately 3/23.    Cori asked this RN to schedule the appointments and return call to her.

## 2021-02-19 NOTE — PROGRESS NOTES
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 Bo presents today for her weekly on treatment visit.  She continues to tolerate her  treatment well. She reports mild fatigue,  sciatic nerve pain, localized skin irritation and edema that are manageable at this point.     Hospitalization: None     Missed Treatments: 2/15/2021: bad weather      Objective:   Gen: A/Ox3. NAD  Skin: No evidence of erythema or swelling  Psych: Appropriate mood and affect     Toxicities:  Fatigue; grade I  Edema: grade I     Labs: No interval labs     Assessment: Ms. 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  Her most recent surveillance scan showed evidence of disease recurrence. She is currently undergoing  neoadjuvant radiation therapy.     Tolerating radiation therapy well.  All questions and concerns addressed.     Mosaiq chart and setup information reviewed  MVCT/IGRT images checked     Medication Review  Med list reviewed with patient?: Yes     Educational Topic Discussed  Education Instructions: reviewed     Plan:   1. Continue current therapy.  2. Use of Aquaphor for skin care  3. Use of pain medications as needed  4. Follow-up with orthopedic surgery for repeat imaging and surgical planning at their discretion after completion of radiation treatment        Sean Ayala MD  PGY-4 Resident, Radiation Oncology  [facility]     I saw the patient with the resident.  I agree with the resident note and plan of care.      Savannah Colin MD

## 2021-02-21 NOTE — TELEPHONE ENCOUNTER
This RN returned call to Cori after scheduling appointments.    This confirmed the following with Cori-  1. MRI's on  Monday March 1st at the [facility] starting at 1:00pm  2. Clinic appointment with Dr. Reyes on Thurs March 4th at 11:30 am.  3. Tentative surgery date held for Monday March 23rd    Cori verbalized understanding of the plan of care. This RN encouraged her to reach out to me with any questions.    [phone]

## 2021-02-22 ENCOUNTER — OFFICE VISIT (OUTPATIENT)
Dept: RADIATION ONCOLOGY | Facility: CLINIC | Age: 71
End: 2021-02-22
Attending: RADIOLOGY
Payer: MEDICARE

## 2021-02-22 ENCOUNTER — TELEPHONE (OUTPATIENT)
Dept: ORTHOPEDICS | Facility: CLINIC | Age: 71
End: 2021-02-22

## 2021-02-22 VITALS
SYSTOLIC BLOOD PRESSURE: 130 MMHG | BODY MASS INDEX: 33.47 KG/M2 | WEIGHT: 175 LBS | HEART RATE: 82 BPM | DIASTOLIC BLOOD PRESSURE: 72 MMHG

## 2021-02-22 DIAGNOSIS — C49.22 LIPOSARCOMA OF LEFT THIGH (H): Primary | ICD-10-CM

## 2021-02-22 PROCEDURE — 77386 HC IMRT TREATMENT DELIVERY, COMPLEX: CPT | Performed by: RADIOLOGY

## 2021-02-22 PROCEDURE — 77014 PR CT GUIDE FOR PLACEMENT RADIATION THERAPY FIELDS: CPT | Mod: 26 | Performed by: RADIOLOGY

## 2021-02-22 NOTE — TELEPHONE ENCOUNTER
Cori called this RN after her radiation appointment today.    Cori confirmed upcoming appointments with this RN.    Cori had questions about her proposed surgical procedure. This RN directed to her to wait and discuss in person with Dr. Reyes next week.    Cori asked  to move the proposed date that is being held for surgery to 3/30. Harriet moved the hold.    Cori verbalized understanding of plan of care.

## 2021-02-22 NOTE — LETTER
2021         RE: Janey Bo  [address] [facility] [place] [address]        Dear Colleague,    Thank you for referring your patient, Janey Bo, to the [facility] RADIATION ONCOLOGY. Please see a copy of my visit note below.    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 Bo presents today for her weekly on treatment visit.  She continues to tolerate her  treatment well. She reports mild fatigue,  sciatic nerve pain, localized skin irritation and edema that are manageable at this point.     Hospitalization: None     Missed Treatments: 2/15/2021: bad weather      Objective:   Gen: A/Ox3. NAD  Skin: No evidence of erythema or swelling  Psych: Appropriate mood and affect     Toxicities:  Fatigue; grade I  Edema: grade I     Labs: No interval labs     Assessment: Ms. 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  Her most recent surveillance scan showed evidence of disease recurrence. She is currently undergoing  neoadjuvant radiation therapy.     Tolerating radiation therapy well.  All questions and concerns addressed.     Mosaiq chart and setup information reviewed  MVCT/IGRT images checked     Medication Review  Med list reviewed with patient?: Yes     Educational Topic Discussed  Education Instructions: reviewed     Plan:   1. Continue current therapy.  2. Use  of Aquaphor for skin care  3. Use of pain medications as needed  4. Follow-up with orthopedic surgery for repeat imaging and surgical planning at their discretion after completion of radiation treatment        Sean Ayala MD  PGY-4 Resident, Radiation Oncology  [facility]     I saw the patient with the resident.  I agree with the resident note and plan of care.      Savannah Colin MD

## 2021-02-23 ENCOUNTER — APPOINTMENT (OUTPATIENT)
Dept: RADIATION ONCOLOGY | Facility: CLINIC | Age: 71
End: 2021-02-23
Attending: RADIOLOGY
Payer: MEDICARE

## 2021-02-23 PROCEDURE — 77427 RADIATION TX MANAGEMENT X5: CPT | Performed by: RADIOLOGY

## 2021-02-23 PROCEDURE — 77336 RADIATION PHYSICS CONSULT: CPT | Performed by: RADIOLOGY

## 2021-02-23 PROCEDURE — 77014 PR CT GUIDE FOR PLACEMENT RADIATION THERAPY FIELDS: CPT | Mod: 26 | Performed by: RADIOLOGY

## 2021-02-23 PROCEDURE — 77386 HC IMRT TREATMENT DELIVERY, COMPLEX: CPT | Performed by: RADIOLOGY

## 2021-03-01 ENCOUNTER — ANCILLARY PROCEDURE (OUTPATIENT)
Dept: MRI IMAGING | Facility: CLINIC | Age: 71
End: 2021-03-01
Attending: ORTHOPAEDIC SURGERY
Payer: MEDICARE

## 2021-03-01 DIAGNOSIS — C49.22: ICD-10-CM

## 2021-03-01 PROCEDURE — 73720 MRI LWR EXTREMITY W/O&W/DYE: CPT | Mod: LT | Performed by: RADIOLOGY

## 2021-03-01 PROCEDURE — A9585 GADOBUTROL INJECTION: HCPCS | Performed by: RADIOLOGY

## 2021-03-01 PROCEDURE — G1004 CDSM NDSC: HCPCS | Performed by: RADIOLOGY

## 2021-03-01 PROCEDURE — 72197 MRI PELVIS W/O & W/DYE: CPT | Mod: MG | Performed by: RADIOLOGY

## 2021-03-01 RX ORDER — GADOBUTROL 604.72 MG/ML
7.5 INJECTION INTRAVENOUS ONCE
Status: COMPLETED | OUTPATIENT
Start: 2021-03-01 | End: 2021-03-01

## 2021-03-01 RX ADMIN — GADOBUTROL 7.5 ML: 604.72 INJECTION INTRAVENOUS at 14:10

## 2021-03-01 NOTE — DISCHARGE INSTRUCTIONS
MRI Contrast Discharge Instructions    The IV contrast you received today will pass out of your body in your  urine. This will happen in the next 24 hours. You will not feel this process.  Your urine will not change color.    Drink at least 4 extra glasses of water or juice today (unless your doctor  has restricted your fluids). This reduces the stress on your kidneys.  You may take your regular medicines.    If you are on dialysis: It is best to have dialysis today.    If you have a reaction: Most reactions happen right away. If you have  any new symptoms after leaving the hospital (such as hives or swelling),  call your hospital at the correct number below. Or call your family doctor.  If you have breathing distress or wheezing, call 911.    Special instructions: ***    I have read and understand the above information.    Signature:______________________________________ Date:___________    Staff:__________________________________________ Date:___________     Time:__________    [facility] Departments:    ___[place]: [phone]  ___[facility]: [phone]  ___[place]: [phone] ___[facility]: [phone]  ___[facility]: [phone]  ___[facility]: [phone]  ___[phone]  ___[facility]: [phone]  ___[phone]

## 2021-03-01 NOTE — DISCHARGE INSTRUCTIONS
MRI Contrast Discharge Instructions    The IV contrast you received today will pass out of your body in your  urine. This will happen in the next 24 hours. You will not feel this process.  Your urine will not change color.    Drink at least 4 extra glasses of water or juice today (unless your doctor  has restricted your fluids). This reduces the stress on your kidneys.  You may take your regular medicines.    If you are on dialysis: It is best to have dialysis today.    If you have a reaction: Most reactions happen right away. If you have  any new symptoms after leaving the hospital (such as hives or swelling),  call your hospital at the correct number below. Or call your family doctor.  If you have breathing distress or wheezing, call 911.    Special instructions: ***    I have read and understand the above information.    Signature:______________________________________ Date:___________    Staff:__________________________________________ Date:___________     Time:__________    [place] [facility] Departments:    ___[place]: [phone]  ___[facility]: [phone]  ___[place]: [phone] ___[facility]: [phone]  ___[facility]: [phone]  ___[facility]: [phone]  ___[phone]  ___[facility]: [phone]  ___[phone]

## 2021-03-02 NOTE — RESULT ENCOUNTER NOTE
Dear Ms. Bo:    He repeat MRI scans show no major or worrisome changes.  I can discuss details regarding the planned surgery at your visit this week.    Regards,    John Reyes MD  3/2/2021  11:26 AM

## 2021-03-04 ENCOUNTER — PREP FOR PROCEDURE (OUTPATIENT)
Dept: ORTHOPEDICS | Facility: CLINIC | Age: 71
End: 2021-03-04

## 2021-03-04 ENCOUNTER — OFFICE VISIT (OUTPATIENT)
Dept: ORTHOPEDICS | Facility: CLINIC | Age: 71
End: 2021-03-04
Payer: MEDICARE

## 2021-03-04 DIAGNOSIS — G57.02: ICD-10-CM

## 2021-03-04 DIAGNOSIS — D17.9 ATYPICAL LIPOMA OF SOFT TISSUE: Primary | ICD-10-CM

## 2021-03-04 PROCEDURE — 99214 OFFICE O/P EST MOD 30 MIN: CPT | Performed by: ORTHOPAEDIC SURGERY

## 2021-03-04 NOTE — NURSING NOTE
Teaching Flowsheet     Relevant Diagnosis:  surgical decompression of her sciatic nerve with excision of the intraneural atypical lipomatous tumor  Teaching Topic: Preop Teaching    Person(s) involved in teaching:   Patient     Motivation Level:  Asks Questions: Yes  Eager to Learn: Yes  Cooperative: Yes  Receptive (willing/able to accept information): Yes  Any cultural factors/Mandaeism beliefs that may influence understanding or compliance? No       Patient demonstrates understanding of the following:  Reason for the appointment, diagnosis and treatment plan: Yes  Knowledge of proper use of medications and conditions for which they are ordered (with special attention to potential side effects or drug interactions): Yes  Which situations necessitate calling provider and whom to contact: Yes       Teaching Concerns Addressed:     Cori felt it was appropriate to have her preop done in the PAC clinic, this RN will arrange  This RN instructed on the COVID test 2-4 days prior.    Cori is aware to monitor the skin that was radiated closely. Cori is aware that if the appearance of the skin changes, she should reach out to our team and Dr. Reyes will see her in person again to assess the skin prior to surgery.     Nutritional needs and diet plan: Yes  Pain management techniques: Yes  Wound Care: Yes  How and/when to access community resources: NA     Instructional Materials Used/Given: Preop Packet and Antiseptic Soap       Time spent with patient: 30 minutes.

## 2021-03-04 NOTE — PROGRESS NOTES
[facility], Orthopaedic Oncology Surgery Consultation  by John Reyes M.D.    Janey Bo MRN# [id number]   Age: 68 year old YOB: 1950     Requesting physician: Dr. Jerod Redding, med oncology, [facility]  Dr. David Gaines, general surgery     Dx:    1. 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  3. 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  4. H/o thyroid CA, breast CA, pancreatic CA    Procedures:  1. 11/07/14, Left thigh mass marginal excision (Eric)  2. 1/20/2017, Excision L thigh atypical lipomatous tumor 7 cm, deep. (Eric) [facility]  3. Jan-March 2021, External beam RT 50 Gy L thigh (Dixie) [facility]      I met with Cori today to review the recent MRI findings after completion of her external beam radiation treatment Dr. Colin office arranged.  She did not have complications and is recovering satisfactorily.    Examination demonstrates some erythema to the surrounding skin of the posterior thigh, her incision is otherwise intact.  There is no rash or desquamation of the skin.    Impression and plan:  I had extensive discussion with Viv about the plans for surgical decompression of her sciatic nerve with excision of the intraneural atypical lipomatous tumor.  The purpose of the radiation was to reduce the risk of recurrence.  She is aware of the other risks of secondary malignancy and increased risk of wound healing complications with surgery.  We also discussed the possibility of neural damage resulting either partial or complete paralysis of the muscles innervated by her sciatic nerve with or without sensory disruption as well.  At present, she notes intermittent dysesthesias and sciatica type pain which was present prior to her radiation treatment.  She has no apparent weakness of the foot at present.  There is evidence of denervation atrophy of her  posterior medial and lateral hamstring muscles on MRIs.    John Reyes MD [facility]  Oncology and Adult Reconstructive Surgery  Dept Orthopaedic Surgery, [facility]  [phone] office, [phone] pager  [email or web address]      Total combined visit time and work time before and after clinic visit = 30 min

## 2021-03-04 NOTE — LETTER
3/4/2021         RE: Janey Bo  [address] [facility] [address]        Dear Colleague,    Thank you for referring your patient, Janey Bo, to the [facility] [place]. Please see a copy of my visit note below.        [facility], Orthopaedic Oncology Surgery Consultation  by John Reyes M.D.    Janey Bo MRN# [id number]   Age: 68 year old YOB: 1950     Requesting physician: Dr. Jerod Redding, med oncology, [facility]  Dr. David Gaines, general surgery     Dx:    1. 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  3. 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  4. H/o thyroid CA, breast CA, pancreatic CA    Procedures:  1. 11/07/14, Left thigh mass marginal excision (Eric)  2. 1/20/2017, Excision L thigh atypical lipomatous tumor 7 cm, deep. (Eric) [facility]  3. Jan-March 2021, External beam RT 50 Gy L thigh (Dixie) [facility]      I met with Cori today to review the recent MRI findings after completion of her external beam radiation treatment Dr. Colin office arranged.  She did not have complications and is recovering satisfactorily.    Examination demonstrates some erythema to the surrounding skin of the posterior thigh, her incision is otherwise intact.  There is no rash or desquamation of the skin.    Impression and plan:  I had extensive discussion with Viv about the plans for surgical decompression of her sciatic nerve with excision of the intraneural atypical lipomatous tumor.  The purpose of the radiation was to reduce the risk of recurrence.  She is aware of the other risks of secondary malignancy and increased risk of wound healing complications with surgery.  We also discussed the possibility of neural damage resulting either partial or complete paralysis of the muscles innervated by her sciatic nerve  with or without sensory disruption as well.  At present, she notes intermittent dysesthesias and sciatica type pain which was present prior to her radiation treatment.  She has no apparent weakness of the foot at present.  There is evidence of denervation atrophy of her posterior medial and lateral hamstring muscles on MRIs.    John Reyes MD [facility] Family Professor  Oncology and Adult Reconstructive Surgery  Dept Orthopaedic Surgery, [facility]  [phone] office, [phone] pager  [email or web address]      Total combined visit time and work time before and after clinic visit = 30 min

## 2021-03-10 NOTE — TELEPHONE ENCOUNTER
FUTURE VISIT INFORMATION      SURGERY INFORMATION:    Date: 3.30.21    Location: [facility] OR    Surgeon:  Dr. Reyes    Anesthesia Type:  general    Procedure: Excision of intraneural lipomatous tumor Left posterior thigh    Consult: 3.4.21    RECORDS REQUESTED FROM:       Primary Care Provider: Dr. Redding    Most recent EKG+ [id number].17.17     Most recent Cardiac Stress Test: 4.16.15     Action 3.10.21 MJ   Action Taken Requested EKG strips from      Action 3.22. MJ   Action Taken 2nd request

## 2021-03-12 ENCOUNTER — TELEPHONE (OUTPATIENT)
Dept: ORTHOPEDICS | Facility: CLINIC | Age: 71
End: 2021-03-12

## 2021-03-12 NOTE — TELEPHONE ENCOUNTER
Cori placed call to this RN.  She saw Dr. Osei, he has recommendation for Dr Reyes regarding her upcoming surgery.  Cori asked that I share these recommendations with Dr. Reyes on Monday and follow up with her to share if he is in agreement with the recommendations.    Mali Rondon, BSN, RN  RN Care Coordinator, Dr. Reyes  [facility]

## 2021-03-14 DIAGNOSIS — Z11.59 ENCOUNTER FOR SCREENING FOR OTHER VIRAL DISEASES: ICD-10-CM

## 2021-03-15 ENCOUNTER — PREP FOR PROCEDURE (OUTPATIENT)
Dept: ORTHOPEDICS | Facility: CLINIC | Age: 71
End: 2021-03-15

## 2021-03-15 DIAGNOSIS — C49.22: Primary | ICD-10-CM

## 2021-03-16 ENCOUNTER — PREP FOR PROCEDURE (OUTPATIENT)
Dept: ORTHOPEDICS | Facility: CLINIC | Age: 71
End: 2021-03-16

## 2021-03-16 ENCOUNTER — VIRTUAL VISIT (OUTPATIENT)
Dept: RADIATION ONCOLOGY | Facility: CLINIC | Age: 71
End: 2021-03-16
Attending: NURSE PRACTITIONER
Payer: MEDICARE

## 2021-03-16 DIAGNOSIS — C49.22 LIPOSARCOMA OF LEFT THIGH (H): Primary | ICD-10-CM

## 2021-03-16 NOTE — LETTER
"    3/16/2021         RE: Janey Bo  [address] [address]        Dear Colleague,    Thank you for referring your patient, Janey Bo, to the [facility] RADIATION ONCOLOGY. Please see a copy of my visit note below.    Radiation Oncology Follow-up Visit  2021  The patient has been notified of following:     Due to COVID19 pandemic.  \"This telephone visit will be conducted via a call between you and your physician/provider. We have found that certain health care needs can be provided without the need for a physical exam.  This service lets us provide the care you need with a short phone conversation.  If a prescription is necessary we can send it directly to your pharmacy.  If lab work is needed we can place an order for that and you can then stop by our lab to have the test done at a later time.    Telephone visits are billed at different rates depending on your insurance coverage. During this emergency period, for some insurers they may be billed the same as an in-person visit.  Please reach out to your insurance provider with any questions.    If during the course of the call the physician/provider feels a telephone visit is not appropriate, you will not be charged for this service.\"    Patient has given verbal consent for Telephone visit?  Yes    How would you like to obtain your AVS? Nannettehart    Phone call duration: 18 minutes    Janey Bo  MRN: [id number]   : 1950     DISEASE TREATED:   Atypical lipomatous tumor of the left thigh    RADIATION THERAPY DELIVERED:   5000 cGy completed 21    INTERVAL SINCE COMPLETION OF RADIATION THERAPY:   3 weeks    SUBJECTIVE:   Janey Bo is a 70 year old female who is being seen by phone visit as she is concerned about her skin reaction.  She noticed mild redness at the end of treatment, but the skin reaction seemed to get worse after treatment was complete. She has been using " aquaphor.  She notices mild itching and some soreness.  The reaction is reyes severe near her groin.  She notices some leathery area and some darkening. She think it is probably better today than a day or 2 ago.  No fevers and feels well overall.  She does still have some fatigue.  She has surgery planned for 3/30 with Dr. Reyes.    ROS:  Complete review of systems is negative except for symptoms discussed in subjective above.    Current Outpatient Medications   Medication     albuterol (PROAIR HFA/PROVENTIL HFA/VENTOLIN HFA) 108 (90 Base) MCG/ACT inhaler     amylase-lipase-protease (CREON 24) 57993-93010 units CPEP per EC capsule     aspirin 81 MG tablet     biotin 5 MG CAPS     buPROPion (WELLBUTRIN SR) 200 MG 12 hr tablet     butalbital-aspirin-caffeine (FIORINAL) -40 MG capsule     Calcium-Magnesium-Vitamin D (CALCIUM MAGNESIUM PO)     cholecalciferol (VITAMIN D3) 10 mcg (400 units) TABS tablet     DilTIAZem HCl Coated Beads (DILTIAZEM CD PO)     escitalopram (LEXAPRO) 10 MG tablet     levothyroxine (SYNTHROID/LEVOTHROID) 150 MCG tablet     methylphenidate (RITALIN) 10 MG tablet     metroNIDAZOLE (METROGEL) 0.75 % external gel     NAPROXEN SODIUM PO     potassium chloride ER (KLOR-CON M) 20 MEQ CR tablet     Probiotic Product (ALIGN) CAPS     raloxifene (EVISTA) 60 MG tablet     sucralfate (CARAFATE) 1 GM/10ML suspension     tiZANidine (ZANAFLEX) 2 MG tablet     VITAMIN E BLEND PO     No current facility-administered medications for this visit.           Allergies   Allergen Reactions     Nuts Anaphylaxis     thickened throat     Pineapple Anaphylaxis and Other (See Comments)     Thickening in throat  thickened throat       Black [place] Flavor Other (See Comments)     thickened throat  thickened throat     Liquid Adhesive Other (See Comments)     Tears my skin off     Morphine Itching and Other (See Comments)     Itchiness       Morphine Hcl Itching       Past Medical History:   Diagnosis Date     Anxiety       Arrhythmia      Breast cancer (H)      Carpal tunnel syndrome      Chronic osteoarthritis      Depressive disorder      Diabetes (H) 2018     Edema      Fatigue      Gastro-oesophageal reflux disease      Heart murmur      Hyperlipaemia      Migraines      Osteoarthrosis      Palpitations      Pancreatic cancer (H)      Pancreatic disease      PONV (postoperative nausea and vomiting)      PVC's (premature ventricular contractions)      Scoliosis      Shortness of breath      Thyroid disease     Hypothyroidsim     Uncomplicated asthma     Exercise induced.          PHYSICAL EXAM:  There were no vitals taken for this visit.  Gen: Alert, in NAD.  Very sweet.  Skin: From pictures she sent, there is moderate erythema over entire treatment field.  There is some scattered hyper pigmentation and in left groin, there is more leathering of skin appearance. No areas of moist desqumation.        LABS AND IMAGING:  Reviewed.    IMPRESSION:   Ms. Bo is a 70 year old female 3 weeks out from radiation to left thigh with concerns about her skin reaction.    PLAN:   Patient does feel her skin is slightly better today than a day or 2 ago.  Discussed that often the skin is worse a bit after treatment than right at the end of treatment.  Based on photos I think this is with in normal limits of radiation dermatitis.  I did discuss that she could use hydrocortisone, but she really doesn't want to do that.  I will follow up with her on Friday to see if she notes some improvement.  I told her to call me if she notices worsening of the reaction as that would not be typical this far out.        Kayce Murguia NP  Radiation Oncology  [facility]

## 2021-03-16 NOTE — PROGRESS NOTES
"Radiation Oncology Follow-up Visit  2021  The patient has been notified of following:     Due to COVID19 pandemic.  \"This telephone visit will be conducted via a call between you and your physician/provider. We have found that certain health care needs can be provided without the need for a physical exam.  This service lets us provide the care you need with a short phone conversation.  If a prescription is necessary we can send it directly to your pharmacy.  If lab work is needed we can place an order for that and you can then stop by our lab to have the test done at a later time.    Telephone visits are billed at different rates depending on your insurance coverage. During this emergency period, for some insurers they may be billed the same as an in-person visit.  Please reach out to your insurance provider with any questions.    If during the course of the call the physician/provider feels a telephone visit is not appropriate, you will not be charged for this service.\"    Patient has given verbal consent for Telephone visit?  Yes    How would you like to obtain your AVS? Nannettehart    Phone call duration: 18 minutes    Janey Bo  MRN: [id number]   : 1950     DISEASE TREATED:   Atypical lipomatous tumor of the left thigh    RADIATION THERAPY DELIVERED:   5000 cGy completed 21    INTERVAL SINCE COMPLETION OF RADIATION THERAPY:   3 weeks    SUBJECTIVE:   Janey Bo is a 70 year old female who is being seen by phone visit as she is concerned about her skin reaction.  She noticed mild redness at the end of treatment, but the skin reaction seemed to get worse after treatment was complete. She has been using aquaphor.  She notices mild itching and some soreness.  The reaction is reyes severe near her groin.  She notices some leathery area and some darkening. She think it is probably better today than a day or 2 ago.  No fevers and feels well overall.  She does still have some " fatigue.  She has surgery planned for 3/30 with Dr. Reyes.    ROS:  Complete review of systems is negative except for symptoms discussed in subjective above.    Current Outpatient Medications   Medication     albuterol (PROAIR HFA/PROVENTIL HFA/VENTOLIN HFA) 108 (90 Base) MCG/ACT inhaler     amylase-lipase-protease (CREON 24) 82241-20583 units CPEP per EC capsule     aspirin 81 MG tablet     biotin 5 MG CAPS     buPROPion (WELLBUTRIN SR) 200 MG 12 hr tablet     butalbital-aspirin-caffeine (FIORINAL) -40 MG capsule     Calcium-Magnesium-Vitamin D (CALCIUM MAGNESIUM PO)     cholecalciferol (VITAMIN D3) 10 mcg (400 units) TABS tablet     DilTIAZem HCl Coated Beads (DILTIAZEM CD PO)     escitalopram (LEXAPRO) 10 MG tablet     levothyroxine (SYNTHROID/LEVOTHROID) 150 MCG tablet     methylphenidate (RITALIN) 10 MG tablet     metroNIDAZOLE (METROGEL) 0.75 % external gel     NAPROXEN SODIUM PO     potassium chloride ER (KLOR-CON M) 20 MEQ CR tablet     Probiotic Product (ALIGN) CAPS     raloxifene (EVISTA) 60 MG tablet     sucralfate (CARAFATE) 1 GM/10ML suspension     tiZANidine (ZANAFLEX) 2 MG tablet     VITAMIN E BLEND PO     No current facility-administered medications for this visit.           Allergies   Allergen Reactions     Nuts Anaphylaxis     thickened throat     Pineapple Anaphylaxis and Other (See Comments)     Thickening in throat  thickened throat       Black [place] Flavor Other (See Comments)     thickened throat  thickened throat     Liquid Adhesive Other (See Comments)     Tears my skin off     Morphine Itching and Other (See Comments)     Itchiness       Morphine Hcl Itching       Past Medical History:   Diagnosis Date     Anxiety      Arrhythmia      Breast cancer (H)      Carpal tunnel syndrome      Chronic osteoarthritis      Depressive disorder      Diabetes (H) 2018     Edema      Fatigue      Gastro-oesophageal reflux disease      Heart murmur      Hyperlipaemia      Migraines       Osteoarthrosis      Palpitations      Pancreatic cancer (H)      Pancreatic disease      PONV (postoperative nausea and vomiting)      PVC's (premature ventricular contractions)      Scoliosis      Shortness of breath      Thyroid disease     Hypothyroidsim     Uncomplicated asthma     Exercise induced.          PHYSICAL EXAM:  There were no vitals taken for this visit.  Gen: Alert, in NAD.  Very sweet.  Skin: From pictures she sent, there is moderate erythema over entire treatment field.  There is some scattered hyper pigmentation and in left groin, there is more leathering of skin appearance. No areas of moist desqumation.        LABS AND IMAGING:  Reviewed.    IMPRESSION:   Ms. Bo is a 70 year old female 3 weeks out from radiation to left thigh with concerns about her skin reaction.    PLAN:   Patient does feel her skin is slightly better today than a day or 2 ago.  Discussed that often the skin is worse a bit after treatment than right at the end of treatment.  Based on photos I think this is with in normal limits of radiation dermatitis.  I did discuss that she could use hydrocortisone, but she really doesn't want to do that.  I will follow up with her on Friday to see if she notes some improvement.  I told her to call me if she notices worsening of the reaction as that would not be typical this far out.        Kayce Murguia, NP  Radiation Oncology  [facility]

## 2021-03-22 NOTE — TELEPHONE ENCOUNTER
Records received 03/22/21    Facility  [facility]    Outcome 1/17/2017 ECG tracings sent to scan

## 2021-03-23 ENCOUNTER — PRE VISIT (OUTPATIENT)
Dept: SURGERY | Facility: CLINIC | Age: 71
End: 2021-03-23

## 2021-03-23 ENCOUNTER — ANESTHESIA EVENT (OUTPATIENT)
Dept: SURGERY | Facility: CLINIC | Age: 71
End: 2021-03-23

## 2021-03-23 ENCOUNTER — APPOINTMENT (OUTPATIENT)
Dept: LAB | Facility: CLINIC | Age: 71
End: 2021-03-23
Payer: MEDICARE

## 2021-03-23 ENCOUNTER — OFFICE VISIT (OUTPATIENT)
Dept: SURGERY | Facility: CLINIC | Age: 71
End: 2021-03-23
Payer: MEDICARE

## 2021-03-23 VITALS
WEIGHT: 180 LBS | OXYGEN SATURATION: 95 % | HEART RATE: 86 BPM | RESPIRATION RATE: 20 BRPM | TEMPERATURE: 98.4 F | BODY MASS INDEX: 33.99 KG/M2 | SYSTOLIC BLOOD PRESSURE: 120 MMHG | DIASTOLIC BLOOD PRESSURE: 75 MMHG | HEIGHT: 61 IN

## 2021-03-23 DIAGNOSIS — Z01.818 PREOP EXAMINATION: ICD-10-CM

## 2021-03-23 DIAGNOSIS — Z01.818 PREOP EXAMINATION: Primary | ICD-10-CM

## 2021-03-23 LAB
ABO + RH BLD: NORMAL
ABO + RH BLD: NORMAL
ALBUMIN UR-MCNC: NEGATIVE MG/DL
ANION GAP SERPL CALCULATED.3IONS-SCNC: 5 MMOL/L (ref 3–14)
APPEARANCE UR: ABNORMAL
BACTERIA #/AREA URNS HPF: ABNORMAL /HPF
BILIRUB UR QL STRIP: NEGATIVE
BLD GP AB SCN SERPL QL: NORMAL
BLOOD BANK CMNT PATIENT-IMP: NORMAL
BLOOD BANK CMNT PATIENT-IMP: NORMAL
BUN SERPL-MCNC: 15 MG/DL (ref 7–30)
CALCIUM SERPL-MCNC: 9.4 MG/DL (ref 8.5–10.1)
CHLORIDE SERPL-SCNC: 109 MMOL/L (ref 94–109)
CO2 SERPL-SCNC: 26 MMOL/L (ref 20–32)
COLOR UR AUTO: YELLOW
CREAT SERPL-MCNC: 0.97 MG/DL (ref 0.52–1.04)
ERYTHROCYTE [DISTWIDTH] IN BLOOD BY AUTOMATED COUNT: 12 % (ref 10–15)
GFR SERPL CREATININE-BSD FRML MDRD: 58 ML/MIN/{1.73_M2}
GLUCOSE SERPL-MCNC: 74 MG/DL (ref 70–99)
GLUCOSE UR STRIP-MCNC: NEGATIVE MG/DL
HCT VFR BLD AUTO: 42 % (ref 35–47)
HGB BLD-MCNC: 14 G/DL (ref 11.7–15.7)
HGB UR QL STRIP: NEGATIVE
HYALINE CASTS #/AREA URNS LPF: 1 /LPF (ref 0–2)
KETONES UR STRIP-MCNC: NEGATIVE MG/DL
LEUKOCYTE ESTERASE UR QL STRIP: ABNORMAL
MCH RBC QN AUTO: 32 PG (ref 26.5–33)
MCHC RBC AUTO-ENTMCNC: 33.3 G/DL (ref 31.5–36.5)
MCV RBC AUTO: 96 FL (ref 78–100)
MUCOUS THREADS #/AREA URNS LPF: PRESENT /LPF
NITRATE UR QL: NEGATIVE
NT-PROBNP SERPL-MCNC: 90 PG/ML (ref 0–125)
PH UR STRIP: 5 PH (ref 5–7)
PLATELET # BLD AUTO: 249 10E9/L (ref 150–450)
POTASSIUM SERPL-SCNC: 4.5 MMOL/L (ref 3.4–5.3)
RBC # BLD AUTO: 4.37 10E12/L (ref 3.8–5.2)
RBC #/AREA URNS AUTO: 1 /HPF (ref 0–2)
SODIUM SERPL-SCNC: 140 MMOL/L (ref 133–144)
SOURCE: ABNORMAL
SP GR UR STRIP: 1.02 (ref 1–1.03)
SPECIMEN EXP DATE BLD: NORMAL
SQUAMOUS #/AREA URNS AUTO: 2 /HPF (ref 0–1)
TRANS CELLS #/AREA URNS HPF: <1 /HPF
UROBILINOGEN UR STRIP-MCNC: 0 MG/DL (ref 0–2)
WBC # BLD AUTO: 6 10E9/L (ref 4–11)
WBC #/AREA URNS AUTO: 10 /HPF (ref 0–5)

## 2021-03-23 PROCEDURE — 36415 COLL VENOUS BLD VENIPUNCTURE: CPT | Performed by: PATHOLOGY

## 2021-03-23 PROCEDURE — 99204 OFFICE O/P NEW MOD 45 MIN: CPT | Performed by: PHYSICIAN ASSISTANT

## 2021-03-23 PROCEDURE — 86850 RBC ANTIBODY SCREEN: CPT | Mod: 90 | Performed by: PATHOLOGY

## 2021-03-23 PROCEDURE — 85027 COMPLETE CBC AUTOMATED: CPT | Performed by: PATHOLOGY

## 2021-03-23 PROCEDURE — 83880 ASSAY OF NATRIURETIC PEPTIDE: CPT | Performed by: PATHOLOGY

## 2021-03-23 PROCEDURE — 86901 BLOOD TYPING SEROLOGIC RH(D): CPT | Mod: 90 | Performed by: PATHOLOGY

## 2021-03-23 PROCEDURE — 86900 BLOOD TYPING SEROLOGIC ABO: CPT | Mod: 90 | Performed by: PATHOLOGY

## 2021-03-23 PROCEDURE — 80048 BASIC METABOLIC PNL TOTAL CA: CPT | Performed by: PATHOLOGY

## 2021-03-23 PROCEDURE — 81001 URINALYSIS AUTO W/SCOPE: CPT | Performed by: PATHOLOGY

## 2021-03-23 ASSESSMENT — PAIN SCALES - GENERAL: PAINLEVEL: NO PAIN (0)

## 2021-03-23 ASSESSMENT — ENCOUNTER SYMPTOMS
SEIZURES: 0
DYSRHYTHMIAS: 1

## 2021-03-23 ASSESSMENT — MIFFLIN-ST. JEOR: SCORE: 1268.85

## 2021-03-23 NOTE — ANESTHESIA PREPROCEDURE EVALUATION
Anesthesia Pre-Procedure Evaluation    Patient: Janey Bo   MRN: [id number] : 1950        Preoperative Diagnosis: * No surgery found *   Procedure :      Past Medical History:   Diagnosis Date     Anxiety      Arrhythmia      Breast cancer (H)      Carpal tunnel syndrome      Chronic osteoarthritis      Depressive disorder      Diabetes (H) 2018     Edema      Fatigue      Gastro-oesophageal reflux disease      Heart murmur      Hyperlipaemia      Migraines      Osteoarthrosis      Palpitations      Pancreatic cancer (H)      Pancreatic disease      PONV (postoperative nausea and vomiting)      PVC's (premature ventricular contractions)      Scoliosis      Shortness of breath      Thyroid disease     Hypothyroidsim     Uncomplicated asthma     Exercise induced.       Past Surgical History:   Procedure Laterality Date     APPENDECTOMY       C HAND/FINGER SURGERY UNLISTED       C STOMACH SURGERY PROCEDURE UNLISTED       CHOLECYSTECTOMY       COLONOSCOPY       EXCISE MASS LOWER EXTREMITY Left 2014    Procedure: EXCISE MASS LOWER EXTREMITY;  Surgeon: John Reyes MD;  [facility]: [facility] OR     EXCISE VILLALOBOS'S NEUROMA FOOT       EXCISE SOFT TISSUE TUMOR THIGH Left 2017    Procedure: EXCISE SOFT TISSUE TUMOR THIGH;  Surgeon: John Reyes MD;  Location: [facility] OR     INNER EAR SURGERY       LUMPECTOMY BREAST       OOPHORECTOMY       partial hysterectomy       thyroidectomy       TONSILLECTOMY & ADENOIDECTOMY       WHIPPLE PROCEDURE       ZZ GASTROSCOPY,FL        Allergies   Allergen Reactions     Nuts Anaphylaxis     thickened throat     Pineapple Anaphylaxis and Other (See Comments)     Thickening in throat  thickened throat       Black [place] Flavor Other (See Comments)     thickened throat  thickened throat     Liquid Adhesive Other (See Comments)     Tears my skin off     Morphine Itching and Other (See Comments)     Itchiness       Morphine Hcl Itching      Social History     Tobacco Use      Smoking status: Former Smoker     Packs/day: 1.50     Years: 15.00     Pack years: 22.50     Types: Cigarettes     Smokeless tobacco: Never Used     Tobacco comment: quit 30 yrs ago   Substance Use Topics     Alcohol use: No      Wt Readings from Last 1 Encounters:   02/22/21 79.4 kg (175 lb)        Anesthesia Evaluation   Pt has had prior anesthetic.     History of anesthetic complications  - PONV.      ROS/MED HX  ENT/Pulmonary:     (+) SHIELA risk factors, snores loudly, hypertension, obese, Intermittent, asthma Last exacerbation: none,  Treatment: Inhaler prn,      Neurologic:     (+) migraines (approx one per month),  (-) no seizures and no CVA   Cardiovascular:     (+) -----dysrhythmias (h/o PACs),  (-) hypertension   METS/Exercise Tolerance:     Hematologic:  - neg hematologic  ROS  (-) history of blood clots and history of blood transfusion   Musculoskeletal: Comment: Left shoulder, torn RTC  Left scapholunate collapse      GI/Hepatic:     (+) GERD,     Renal/Genitourinary:       Endo:     (+) thyroid problem, hypothyroidism, Obesity,     Psychiatric/Substance Use:     (+) psychiatric history anxiety     Infectious Disease:       Malignancy: Comment: H/o pancreatic endocrine tumor s/p Whipple 2008  (+) Malignancy, History of Breast and Other.Breast CA status post Surgery and Radiation.  Other CA thyroid papillary CA, 2012 s/p thyroidectomy status post.    Other:  - neg other ROS          Physical Exam    Airway        Mallampati: II   TM distance: > 3 FB   Neck ROM: full   Mouth opening: > 3 cm    Respiratory Devices and Support         Dental  no notable dental history         Cardiovascular   cardiovascular exam normal          Pulmonary   pulmonary exam normal                OUTSIDE LABS:  CBC:   Lab Results   Component Value Date    WBC 7.2 11/10/2014    WBC 7.9 11/08/2014    HGB 9.3 (L) 11/10/2014    HGB 10.1 (L) 11/08/2014    HCT 29.3 (L) 11/10/2014    HCT 32.4 (L) 11/08/2014     11/10/2014    PLT  "251 11/08/2014     BMP:   Lab Results   Component Value Date     11/10/2014     11/09/2014    POTASSIUM 3.5 11/10/2014    POTASSIUM 3.5 11/09/2014    CHLORIDE 101 11/10/2014    CHLORIDE 98 11/09/2014    CO2 31 11/10/2014    CO2 33 (H) 11/09/2014    BUN 11 11/10/2014    BUN 12 11/09/2014    CR 0.97 11/10/2014    CR 1.05 (H) 11/09/2014     (H) 11/10/2014     (H) 11/09/2014     COAGS:   Lab Results   Component Value Date    PTT 25 11/07/2014    INR 1.04 11/07/2014     POC: No results found for: BGM, HCG, HCGS  HEPATIC:   Lab Results   Component Value Date    ALBUMIN 3.9 11/12/2013     OTHER:   Lab Results   Component Value Date    A1C 5.9 04/08/2014    KELLY 8.4 (L) 11/10/2014    PHOS 3.2 11/12/2013    MAG 2.2 11/12/2013    TSH 0.27 (L) 03/24/2014    T4 1.50 03/24/2014    T3 76 03/24/2014             PAC Discussion and Assessment    ASA Classification: 3  Case is suitable for: [place]  Anesthetic techniques and relevant risks discussed: GA  Invasive monitoring and risk discussed: No    Possibility and Risk of blood transfusion discussed: Yes            PAC Resident/NP Anesthesia Assessment: Janey \"Cori\" Betzy is a 70 year old female scheduled for Excision of intraneural lipomatous tumor Left posterior thigh, and Left shoulder subacromial bursa steroid injection on 3/30/21 by Dr. Reyes in treatment of atypical lipoma of soft tissue and left shoulder pain.  PAC referral for risk assessment and optimization for anesthesia:    Pre-operative considerations:  1.  Cardiac:  Functional status- METS 1-2. Pt is inactive.  She tries to get 1000 steps per day in her home. She is somewhat winded at the top of her flight of stairs if she takes them too fast.  She denies chest pain, SOB, orthopnea, edema.    Intermediate risk surgery with 0.4% (RCRI #) risk of major adverse cardiac event.  She reports a h/o PACs for which she takes diltiazem.  She reports that she can feel occasional PACs.  RRR on " exam today.  Last seen by outside cardiology 1/21/2019 and she was told she could follow up with PCP for diltiazem refills.  Negative stress test 2015.  Will obtain updated EKG today and BNP.  If BNP elevated will order echocardiogram.  Hold ASA 81mg for 7 days prior to DOS.    2.  Pulm:  Airway feasible.  SHIELA risk:  Intermediate.  Remote h/o smoking.  --asthma, mild intermittent.  Albuterol inhaler prn.  No exacerbations.  --denies pulmonary symptoms.  Lungs CTA today.    3.  GI:  Risk of PONV score = 4.  If > 2, anti-emetic intervention recommended.  --CREON for pancreatic insufficiency and prn Carafate.  She prefers to take TUMS for any GERD symptoms.  --recent diarrhea from amoxicillin (pt had dental abscess, left lower molar. S/p dental extraction 3/17/21).  She is eating a BRAT diet, improving.  --ordered UA as pt is worried about UTI from copious diarrhea.  She notes some lower abdominal fullness. Denies dysuria/hematuria/fevers    4.  Endo: hypothyroidism, will take levothyroxine DOS.    --Chart notes h/o diabetes.  No medications and A1c 9/2020 of 6.3    5.  Neuro: h/o migraine headaches. Gets about one every 4/6 weeks.   Hold Fiorinal for 7 days.       6.  Psych: h/o anxiety, continue Lexapro and Bupropion.  Symptoms well controlled.  --takes Ritalin for alertness, will hold DOS.    7.  Onc:  --h/o pancreatic endocrine tumor s/p whipple 2008.    --h/o thyroid papillary cancer 2012  --h/o Breast cancer s/p lumpectomy and radiation 2012.  On Raloxifene.    8.  MSK:   --left rotator cuff arthropathy.  Getting steroid injection during surgery to hopefully help her as she will need to use walker and put weight on shoulder after tumor excision.  --wearing splint on left wrist for scapholunate collapse.  --Hold Naproxen for 4 days.    VTE risk: 4.5% (age, cancer, FH of VTE)      Patient is optimized and is acceptable candidate for the proposed procedure.  No further diagnostic evaluation is needed.       Patient  discussed with Dr. Galvan.    **For further details of assessment, testing, and physical exam please see H and P completed on same date.          Norma Dodge PA-C, [facility]    Reviewed and Signed by PAC Mid-Level Provider/Resident  Mid-Level Provider/Resident: Norma Dodge  Date: 3/23/21                                 CHUCKY BeckettC

## 2021-03-23 NOTE — H&P
Pre-Operative H & P     CC:  Preoperative exam to assess for increased cardiopulmonary risk while undergoing surgery and anesthesia.    Date of Encounter: 3/23/2021  Primary Care Physician:  Jerod Redding  Associated Diagnosis: atypical lipoma of soft tissue, left posterior thigh    HPI  Janey Bo is a 70 year old female who presents for pre-operative H & P in preparation for Excision of intraneural lipomatous tumor Left posterior thigh, Left shoulder subacromial bursa steroid injection with Dr. Reyes on 3/30/21 at [facility].     This is a 70-year-old female with past medical history significant for PACs, smoking, migraine headaches, papillary thyroid cancer status post thyroidectomy now hypothyroid, GERD, anxiety, left shoulder pain, left wrist pain, breast cancer status post lumpectomy and radiation, pancreatic endocrine tumor status post Whipple 2008.    Patient underwent a left thigh mass marginal excision in 2014 which was an atypical lipomatous tumor.  She underwent another excision in 2017.  External beam radiation treatment to reduce the risk of recurrence was completed February 23, 2021.  She is ready for the above procedure.    History is obtained from the patient and chart review.    Past Medical History  Past Medical History:   Diagnosis Date     Anxiety      Arrhythmia      Breast cancer (H)      Carpal tunnel syndrome      Chronic osteoarthritis      Depressive disorder      Diabetes (H) 2018     Edema      Fatigue      Gastro-oesophageal reflux disease      Heart murmur      Hyperlipaemia      Migraines      Osteoarthrosis      Palpitations      Pancreatic cancer (H)      Pancreatic disease      PONV (postoperative nausea and vomiting)      PVC's (premature ventricular contractions)      Scoliosis      Shortness of breath      Thyroid disease     Hypothyroidsim     Uncomplicated asthma     Exercise induced.        Past Surgical History  Past  Surgical History:   Procedure Laterality Date     APPENDECTOMY       C HAND/FINGER SURGERY UNLISTED       C STOMACH SURGERY PROCEDURE UNLISTED       CHOLECYSTECTOMY       COLONOSCOPY       EXCISE MASS LOWER EXTREMITY Left 11/7/2014    Procedure: EXCISE MASS LOWER EXTREMITY;  Surgeon: John Reyes MD;  Location: [facility]     EXCISE VILLALOBOS'S NEUROMA FOOT       EXCISE SOFT TISSUE TUMOR THIGH Left 1/20/2017    Procedure: EXCISE SOFT TISSUE TUMOR THIGH;  Surgeon: John Reyes MD;  Location: [facility] OR     INNER EAR SURGERY       LUMPECTOMY BREAST       OOPHORECTOMY       partial hysterectomy       thyroidectomy       TONSILLECTOMY & ADENOIDECTOMY       WHIPPLE PROCEDURE       ZZ GASTROSCOPY,FL         Hx of Blood transfusions/reactions: denies     Hx of abnormal bleeding or anti-platelet use: ASA 81mg    Menstrual history: No LMP recorded. Patient has had a hysterectomy.:     Steroid use in the last year: denies    Personal or FH with difficulty with Anesthesia:  PONV    Prior to Admission Medications  Current Outpatient Medications   Medication Sig Dispense Refill     albuterol (PROAIR HFA/PROVENTIL HFA/VENTOLIN HFA) 108 (90 Base) MCG/ACT inhaler Inhale 2 puffs into the lungs every 6 hours as needed        amylase-lipase-protease (CREON 24) 11410-97717 units CPEP per EC capsule Take 1 capsule by mouth as needed        buPROPion (WELLBUTRIN SR) 200 MG 12 hr tablet Take 2 tablets by mouth every morning        DilTIAZem HCl Coated Beads (DILTIAZEM CD PO) Take by mouth every morning        escitalopram (LEXAPRO) 10 MG tablet Take by mouth every evening        levothyroxine (SYNTHROID/LEVOTHROID) 150 MCG tablet Take 150 mcg by mouth every morning        methylphenidate (RITALIN) 10 MG tablet every morning        metroNIDAZOLE (METROGEL) 0.75 % external gel At Bedtime        NAPROXEN SODIUM PO        potassium chloride ER (KLOR-CON M) 20 MEQ CR tablet Take 20 mEq by mouth every morning        Probiotic Product (ALIGN) CAPS  Take 4 mg by mouth 2 times daily        raloxifene (EVISTA) 60 MG tablet Take 60 mg by mouth every morning        sucralfate (CARAFATE) 1 GM/10ML suspension Take 1,000 mg by mouth as needed        tiZANidine (ZANAFLEX) 2 MG tablet Take 2 mg by mouth At Bedtime        aspirin 81 MG tablet Take 1 tablet by mouth daily        biotin 5 MG CAPS 5 mg        butalbital-aspirin-caffeine (FIORINAL) -40 MG capsule        Calcium-Magnesium-Vitamin D (CALCIUM MAGNESIUM PO) Take 600 mg by mouth        cholecalciferol (VITAMIN D3) 10 mcg (400 units) TABS tablet Take 400 Units by mouth        VITAMIN E BLEND PO          Allergies  Allergies   Allergen Reactions     Nuts Anaphylaxis     thickened throat     Pineapple Anaphylaxis and Other (See Comments)     Thickening in throat  thickened throat       Black Dickey Flavor Other (See Comments)     thickened throat  thickened throat     Liquid Adhesive Other (See Comments)     Tears my skin off     Morphine Itching and Other (See Comments)     Itchiness       Morphine Hcl Itching       Social History  Social History     Socioeconomic History     Marital status:      Spouse name: Not on file     Number of children: Not on file     Years of education: Not on file     Highest education level: Not on file   Occupational History     Not on file   Social Needs     Financial resource strain: Not on file     Food insecurity     Worry: Not on file     Inability: Not on file     Transportation needs     Medical: Not on file     Non-medical: Not on file   Tobacco Use     Smoking status: Former Smoker     Packs/day: 1.50     Years: 15.00     Pack years: 22.50     Types: Cigarettes     Smokeless tobacco: Never Used     Tobacco comment: quit 30 yrs ago   Substance and Sexual Activity     Alcohol use: No     Drug use: No     Sexual activity: Not Currently     Partners: Male   Lifestyle     Physical activity     Days per week: Not on file     Minutes per session: Not on file     Stress:  Not on file   Relationships     Social connections     Talks on phone: Not on file     Gets together: Not on file     Attends Anabaptism service: Not on file     Active member of club or organization: Not on file     Attends meetings of clubs or organizations: Not on file     Relationship status: Not on file     Intimate partner violence     Fear of current or ex partner: Not on file     Emotionally abused: Not on file     Physically abused: Not on file     Forced sexual activity: Not on file   Other Topics Concern     Not on file   Social History Narrative     Not on file       Family History  Family History   Problem Relation Age of Onset     Asthma Paternal Grandfather      Obesity Paternal Grandfather      Colon Cancer Paternal Grandfather      Cancer Paternal Grandfather      Other Cancer Paternal Grandfather      Diabetes Father      Hypertension Father      Osteoporosis Father      Cerebrovascular Disease Father      Thyroid Disease Father      Obesity Father      Other Cancer Father      Migraines Father      Hypothyroidism Father      Spine Problems Father      Diabetes Paternal Grandmother      Osteoporosis Paternal Grandmother      Thyroid Disease Paternal Grandmother      Hypothyroidism Paternal Grandmother      Low Back Problems Paternal Grandmother      Spine Problems Paternal Grandmother      Osteoporosis Mother      Cerebrovascular Disease Mother      Thyroid Disease Mother      Deep Vein Thrombosis Mother      Hypothyroidism Mother      Spine Problems Mother      Low Back Problems Mother       () Other            Anesthesia Evaluation   Pt has had prior anesthetic.     History of anesthetic complications  - PONV.      ROS/MED HX  ENT/Pulmonary:     (+) SHIELA risk factors, snores loudly, hypertension, obese, Intermittent, asthma Last exacerbation: none,  Treatment: Inhaler prn,      Neurologic:     (+) migraines (approx one per month),  (-) no seizures and no CVA   Cardiovascular:     (+)  "-----dysrhythmias (h/o PACs),  (-) hypertension   METS/Exercise Tolerance:     Hematologic:  - neg hematologic  ROS  (-) history of blood clots and history of blood transfusion   Musculoskeletal: Comment: Left shoulder, torn RTC  Left scapholunate collapse      GI/Hepatic:     (+) GERD,     Renal/Genitourinary:       Endo:     (+) thyroid problem, hypothyroidism, Obesity,     Psychiatric/Substance Use:     (+) psychiatric history anxiety     Infectious Disease:       Malignancy: Comment: H/o pancreatic endocrine tumor s/p Whipple 2008  (+) Malignancy, History of Breast and Other.Breast CA status post Surgery and Radiation.  Other CA thyroid papillary CA, 2012 s/p thyroidectomy status post.    Other:  - neg other ROS            The complete review of systems is negative other than noted in the HPI or here.   Temp: 98.4  F (36.9  C) Temp src: Oral BP: 120/75 Pulse: 86   Resp: 20 SpO2: 95 %         180 lbs 0 oz  5' 1\"[pt reported[   Body mass index is 34.01 kg/m .       Physical Exam  Constitutional: Awake, alert, cooperative, no apparent distress, and appears stated age.  Eyes: Pupils equal, round and reactive to light, extra ocular muscles intact, sclera clear, conjunctiva normal.  HENT: Normocephalic, oral pharynx with moist mucus membranes, fair dentition. Swelling of left cheek (tooth pulled 3/17, no erythema, no drainage or sign of ongoing infection) No goiter appreciated.   Respiratory: Clear to auscultation bilaterally, no crackles or wheezing.  Cardiovascular: Regular rate and rhythm, normal S1 and S2, and no murmur noted.  Carotids +2, no bruits. No edema. Palpable pulses to radial arteries.   GI: Normal bowel sounds, soft, non-distended, mild, diffuse tenderness, no masses palpated  Lymph/Hematologic: No cervical lymphadenopathy and no supraclavicular lymphadenopathy.  Genitourinary:  deferred  Skin: Warm and dry.  No rashes at anticipated surgical site. Some mild erythema noted on posterior left leg, " radiation changes.  No skin breakdown.  Skin intact and dry.  Musculoskeletal: Full ROM of neck. There is no redness, warmth, or swelling of the joints. Gross motor strength is normal.    Neurologic: Awake, alert, oriented to name, place and time. Cranial nerves II-XII are grossly intact. Gait is normal.   Neuropsychiatric: Calm, cooperative. Normal affect.     PRIOR LABS/DIAGNOSTIC STUDIES:  All labs and imaging personally reviewed    Component      Latest Ref Rng & Units 3/23/2021   WBC      4.0 - 11.0 10e9/L 6.0   RBC Count      3.8 - 5.2 10e12/L 4.37   Hemoglobin      11.7 - 15.7 g/dL 14.0   Hematocrit      35.0 - 47.0 % 42.0   MCV      78 - 100 fl 96   MCH      26.5 - 33.0 pg 32.0   MCHC      31.5 - 36.5 g/dL 33.3   RDW      10.0 - 15.0 % 12.0   Platelet Count      150 - 450 10e9/L 249     Component      Latest Ref Rng & Units 3/23/2021   Sodium      133 - 144 mmol/L 140   Potassium      3.4 - 5.3 mmol/L 4.5   Chloride      94 - 109 mmol/L 109   Carbon Dioxide      20 - 32 mmol/L 26   Anion Gap      3 - 14 mmol/L 5   Glucose      70 - 99 mg/dL 74   Urea Nitrogen      7 - 30 mg/dL 15   Creatinine      0.52 - 1.04 mg/dL 0.97   GFR Estimate      >60 mL/min/1.73:m2 58 (L)   GFR Estimate If Black      >60 mL/min/1.73:m2 68   Calcium      8.5 - 10.1 mg/dL 9.4     Component      Latest Ref Rng & Units 3/23/2021   N-Terminal Pro Bnp      0 - 125 pg/mL 90       EKG: Personally reviewed but formal cardiology read pending:   SR  Normal ECG      Stress test: 2015  CONCLUSION:    Patient exercised 7 minutes 10 seconds on a modified Celso     protocol.     Below normal functional aerobic capacity.     No chest pain with exercise.     Negative  ECG test for diagnostic ST depression.    Normal Stress Echo test, no evidence for ischemia.       Left Ventricular Ejection Fraction: 60 %     MR left thigh  without and with IV contrast 3/1/2021 2:53 PM  MRI of the pelvis without and with IV contrast  Impression:     Redemonstration  "of known atypical lipomatous tumor of the proximal  left thigh intimate with the sciatic nerve. The craniocaudal dimension  is not significantly changed. Mildly enlarged in the axial plane today  measuring 2.5 x 5.2 cm, previously 2.2 x 4.6 cm. Tiny nodule of  enhancement along the lateral margin measuring up to 10 mm on series  26 image 31, previously 8 mm, attention on follow-up.        Outside records reviewed from: care everywhere        ASSESSMENT and PLAN  Janey \"Cori\" Betzy is a 70 year old female scheduled for Excision of intraneural lipomatous tumor Left posterior thigh, and Left shoulder subacromial bursa steroid injection on 3/30/21 by Dr. Reyes in treatment of atypical lipoma of soft tissue and left shoulder pain.  PAC referral for risk assessment and optimization for anesthesia:    Pre-operative considerations:  1.  Cardiac:  Functional status- METS 1-2. Pt is inactive.  She tries to get 1000 steps per day in her home. She is somewhat winded at the top of her flight of stairs if she takes them too fast.  She denies chest pain, SOB, orthopnea, edema.    Intermediate risk surgery with 0.4% (RCRI #) risk of major adverse cardiac event.  She reports a h/o PACs for which she takes diltiazem.  She reports that she can feel occasional PACs.  RRR on exam today.  Last seen by outside cardiology 1/21/2019 and she was told she could follow up with PCP for diltiazem refills.  Negative stress test 2015.  Will obtain updated EKG today and BNP.  If BNP elevated will order echocardiogram.  Hold ASA 81mg for 7 days prior to DOS.    2.  Pulm:  Airway feasible.  SHIELA risk:  Intermediate.  Remote h/o smoking.  --asthma, mild intermittent.  Albuterol inhaler prn.  No exacerbations.  --denies pulmonary symptoms.  Lungs CTA today.    3.  GI:  Risk of PONV score = 4.  If > 2, anti-emetic intervention recommended.  --CREON for pancreatic insufficiency and prn Carafate.  She prefers to take TUMS for any GERD " symptoms.  --recent diarrhea from amoxicillin (pt had dental abscess, left lower molar. S/p dental extraction 3/17/21).  She is eating a BRAT diet, improving.  --ordered UA as pt is worried about UTI from copious diarrhea.  She notes some lower abdominal fullness. Denies dysuria/hematuria/fevers    4.  Endo: hypothyroidism, will take levothyroxine DOS.    --Chart notes h/o diabetes.  No medications and A1c 9/2020 of 6.3    5.  Neuro: h/o migraine headaches. Gets about one every 4/6 weeks.   Hold Fiorinal for 7 days.       6.  Psych: h/o anxiety, continue Lexapro and Bupropion.  Symptoms well controlled.  --takes Ritalin for alertness, will hold DOS.    7.  Onc:  --h/o pancreatic endocrine tumor s/p whipple 2008.    --h/o thyroid papillary cancer 2012  --h/o Breast cancer s/p lumpectomy and radiation 2012.  On Raloxifene.    8.  MSK:   --left rotator cuff arthropathy.  Getting steroid injection during surgery to hopefully help her as she will need to use walker and put weight on shoulder after tumor excision.  --wearing splint on left wrist for scapholunate collapse.  --Hold Naproxen for 4 days.    VTE risk: 4.5% (age, cancer, FH of VTE)      Patient is optimized and is acceptable candidate for the proposed procedure.  No further diagnostic evaluation is needed.       Patient discussed with Dr. Galvan.      Addendum: EKG 3/23/21 shows SR and BNP within normal limits.  No further work up needed.    Norma Dodge PA-C  Preoperative Assessment Center  [facility]  Phone: [phone]  Fax: [phone]

## 2021-03-23 NOTE — PATIENT INSTRUCTIONS
Preparing for Your Surgery      Name:  Janey Bo   MRN:  [id number]   :  1950   Today's Date:  3/23/2021       Arriving for surgery:  Surgery date:  3/30/21  Arrival time:  12:30 pm    Restrictions due to COVID 19:  One consistent visitor per patient is allowed.  The visitor will be allowed in the pre-op area.  Visitors are asked to leave the building during the surgery.  No ill visitors.  All visitors must wear face mask.    [employer] parking is available for anyone with mobility limitations or disabilities.  ([employer]  24 hours/ 7 days a week; [employer]  7 am- 3:30 pm, Mon- Fri)    Please come to:   [facility] [address]  [address] [place]  [address]  -Come in the front of [facility]. Park your car in the Green Lot.  -Proceed to the 3rd floor, check in at the Adult Surgery Waiting Lounge. [phone]    If an escort is needed stop at the Information Desk in the lobby. Inform the information person that you are here for surgery. An escort to the Adult Surgery Waiting Lounge will be provided.        What can I eat or drink?  -  You may eat and drink normally for up to 8 hours before your surgery.   -  You may have clear liquids until 2 hours before surgery.     Examples of clear liquids:  Water  Clear broth  Juices (apple, white grape, white cranberry  and cider) without pulp  Noncarbonated, powder based beverages  (lemonade and Brian-Aid)  Sodas (Sprite, 7-Up, ginger ale and seltzer)  Coffee or tea (without milk or cream)  Gatorade    -  No Alcohol for at least 24 hours before surgery     Which medicines can I take?  Hold Aspirin + fiorinal for 7 days before surgery.   Hold Multivitamins for 7 days before surgery.  Hold Supplements (vitamin E) for 7 days before surgery.  Hold Ibuprofen (Advil, Motrin) for 1 day before surgery--unless otherwise directed by surgeon.  Hold Naproxen (Aleve) for 4 days before surgery.  -   DO NOT take these medications the day of surgery:  Creon + ritalin + potassium.  -  PLEASE TAKE these medications the day of surgery:  Tylenol if needed; take other morning medications.    How do I prepare myself?  - Please take 2 showers before surgery using Scrubcare or Hibiclens soap.    Use this soap only from the neck to your toes.     Leave the soap on your skin for one minute--then rinse thoroughly.      You may use your own shampoo and conditioner; no other hair products.   - Please remove all jewelry and body piercings.  - No lotions, deodorants or fragrance.  - No makeup or fingernail polish.   - Bring your ID and insurance card.    - All patients are required to have a Covid-19 test within 4 days of surgery/procedure.      -Patients will be contacted by the [facility] scheduling team within 1 week of surgery to make an appointment.      - Patients may call the Scheduling team at [phone] if they have not been scheduled within 4 days of  surgery.    Questions or Concerns:    - For any questions regarding the day of surgery or your hospital stay, please contact the Pre Admission Nursing Office at [phone].       - If you have health changes between today and your surgery please call your surgeon.       For questions after surgery please call your surgeons office.

## 2021-03-23 NOTE — PROCEDURES
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     Treatment Summary:  Radiation Oncology - Course: 1 Protocol:   Treatment Site Current Dose Modality From       To     Elapsed Days Fx.  Left Thigh             5,000 cGy 06 X       1/19/2021  2021  35             25          Dose per Fraction:     200 cGy   Total Dose:      5,000 cGy        COMMENTS: Ms. 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 In 2020, she felt a   mass in her left thigh which was evident on clinical exam. MRI left femur on 12/15/2020 showed stable to mild   increase of size of this soft tissue mass measuring 10 cm in craniocaudal dimensions, consistent with recurrent   disease. She was referred to us for consideration of neoadjuvant radiation therapy in the light of her multiply   recurrent disease. She ultimately received 5,000 cGy in 25 fractions directed to the area of recurrence and   surrounding high risk area of tumor spread using Tomotherapy.     She tolerated her treatment well. She reported mild fatigue, occasional sciatic nerve pain, localized skin   irritation and edema that were manageable. She used Aquaphor for skin care and OTC pain medications as   needed.         ED visits/hospitalizations:  None      Missed treatments: 2/15/2021 due to bad weather     Acute Toxicity Profile by CTC  v5.0:  Fatigue; grade I  Edema: grade I     PAIN MANAGEMENT:   OTC pain medications as needed.                                  FOLLOW UP PLAN:    1-  Follow-up with orthopedic surgery with surveillance imaging at their discretion   2- Return to Radiation Oncology as needed                          Resident Physician: Sean Ayala M.D.   Staff Physician: Savannah Colin M.D.  Physicist: Shawn Dyson     CC:   John Reyes MD                                     Radiation Oncology:  [facility] [address] [place] [address]

## 2021-03-24 LAB — INTERPRETATION ECG - MUSE: NORMAL

## 2021-03-24 NOTE — PROGRESS NOTES
PRE-OP PLAN    Brief: Ancef // Prone // Sterile Tourniquet // Fresh Specimen    Surgical Plan:       Surgical decompression of her sciatic nerve     Excision of the intraneural atypical lipomatous tumor, LEFT posterior thigh    Left Shoulder Subacromial Bursa Steroid (Kenalog 3mg & Bupivacaine 10 ml 1/4% with epi)    Background: There is evidence of denervation atrophy of her posterior medial and lateral hamstring muscles on MRIs.    Dx:    1. 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  3. 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  4. H/o thyroid CA, breast CA, pancreatic CA     Procedures:  1. 11/07/14, Left thigh mass marginal excision (Eric)  2. 1/20/2017, Excision L thigh atypical lipomatous tumor 7 cm, deep. (Eric) [facility]  3. Jan-March 2021, External beam RT 50 Gy L thigh (Dixie) [facility]             Patient Position (indicated by x):     Supine     Supine with torso rolled up on a bump      Floppy lateral on torso length bean bag      Lateral decubitus, bean bag, full length      Lateral decubitus, Wixson hip positioner      Safety paddle side supports x 2 clamped to side rail      Lithotomy, both legs in yellow padded leg trinh      Lithotomy, single leg in yellow padded leg trinh   x   Prone on blanket rolls/round gel pad      Prone on Pino (arched) frame on Jose table      Single thigh in orange arthroscopy clamp      Beach chair semi recumbent      Arm out on radiolucent arm table    x  Split drape with top bar      Revision ZEUS drape with plastic side bags for leg   x  Sterile Tourniqeut      Shoulder pack drape      Negrete catheter            Fracture Table     Jose x-ray table   x  Regular OR table              General Equipment Requests (indicated by x):     x  Pj Cheney, Isiah, retractors    x  Nerve Stimulator [Backup]    x  Extra clamps for passing instruments   x  2.0 & 3.0 Silk Ties on  a pass   x   Penrose drain for nerve retraction      Bone graft, erika sierra      Midas Timoteo Medtronic justin, electric motor      Phenol 5%      [place] BMAC stem cell      Vancomycin 1 gram powder      Zometa 4 mg vials      Depo Medrol steroid      Blunt Pelvic Retractor (.55, Blunt Hohmann with  slight bend)    x  Injection: Kenalog 3mg & Bupivacaine 10 ml 1/4% with epi       [place]otte Osteotomes      Specimens and cultures (indicated by x):      Tissue cultures, aerobic and anaerobic without gram stain     Frozen section   x  pathology specimens - fresh      pathology specimens - formalin        Erik Parsons DO  Adult Joint Reconstruction Fellow  Dept Orthopaedic Surgery, [facility]  [phone] Pager [phone] Office

## 2021-03-25 ENCOUNTER — TELEPHONE (OUTPATIENT)
Dept: ORTHOPEDICS | Facility: CLINIC | Age: 71
End: 2021-03-25

## 2021-03-25 NOTE — TELEPHONE ENCOUNTER
Cori called this RN to discuss the looser stools she has had from being on antibiotics.  Cori does have a history of c-diff, she said these stools do not smell like c-diff.  Cori is hesitant to finish out the remaining doses of oral antibiotics, this RN explained the importance of finishing the prescribed doses for the tooth abscess.  This RN discussed monitoring her gut/bowel movements closely and following up with her primary MD if symptoms worsen.  Cori is using probiotics, on BRAT diet.    SHANNON AlejoN, RN  RN Care Coordinator, Dr. Eric CRUMP [facility]

## 2021-03-26 ENCOUNTER — TELEPHONE (OUTPATIENT)
Dept: ORTHOPEDICS | Facility: CLINIC | Age: 71
End: 2021-03-26

## 2021-03-26 ENCOUNTER — AMBULATORY - HEALTHEAST (OUTPATIENT)
Dept: LAB | Facility: CLINIC | Age: 71
End: 2021-03-26

## 2021-03-26 DIAGNOSIS — Z11.59 SCREENING FOR VIRAL DISEASE: ICD-10-CM

## 2021-03-26 NOTE — TELEPHONE ENCOUNTER
Cori returned call to this RN.  Cori had a fever of 102 during the night, she is urinating every 20 minutes and she has burning with urination.  Cori is going to urgent care now to get a urine culture done.  Cori needs to cancel her surgery on Tuesday.  Due to scheduling issues, surgery moved to 4/30, Cori made aware.  This RN discussed that the preop will need to be repeated, this RN will reach out next week to schedule a virtual PAC appt.    This RN discussed with Cori that if she has a positive UTI, after she completes treatment, Dr. Reyes will require a repeat UA/UC to make sure she has cleared the infection before he will proceed with surgery. Cori verbalized understanding.    Cori will make contact with this RN next week.    Mali Rondon, BSN, RN  RN Care Coordinator, Dr. Reyes  [facility]

## 2021-03-26 NOTE — TELEPHONE ENCOUNTER
Cori had left a voicemail for this RN, call returned.  This RN spoke with Cori's .    Cori had a potential reaction to her vaccine with low grade temp, not feeling well.    Now Cori is concerned that she might have a bladder infection, frequent urination.  This RN discussed that Cori needs to reach out to her primary MD and get a urine culture done.  Cori's  expressed that Cori does not think she can have surgery on Tues.    This RN encouraged them to call me this afternoon with an update,  verbalized understanding.    SHANNON AlejoN, RN  RN Care Coordinator, Dr. Eric CRUMP [facility]

## 2021-04-02 ENCOUNTER — TELEPHONE (OUTPATIENT)
Dept: ORTHOPEDICS | Facility: CLINIC | Age: 71
End: 2021-04-02

## 2021-04-02 DIAGNOSIS — Z11.59 ENCOUNTER FOR SCREENING FOR OTHER VIRAL DISEASES: ICD-10-CM

## 2021-04-02 NOTE — TELEPHONE ENCOUNTER
"Call to Cori to follow up on her health status.    \" I am so glad we cancelled surgery, this is the first day I am finally feeling well\".    Cori stated her urine culture did not require antibiotics.    This RN discussed her surgical date on 4/30, and that I had schedule a PAC appt on 4/26 to re-do her preop since it was over 30 days. Her COVID could be done that day, and if a urine needs to be checked, it could be done also along with any lab work needed.    Cori verbalized that this would work, appt confirmed.    This RN encouraged Cori to contact this RN with any questions.    SHANNON AlejoN, RN  RN Care Coordinator, Dr. Eric CRUMP [facility]      "

## 2021-04-02 NOTE — TELEPHONE ENCOUNTER
Cori called back to this RN to discuss if there had been any cancellations.   This RN confirmed that April 16th is available.  Her h&P was done on 3/23, so it will not need to be re-done.  This RN discussed that the COVID team will reach to schedule her test.    This RN again reviewed the UA/UC in care everywhere. The UA is clear. The UC,  she was told the bacteria was because she did not cleanse herself thoroughly before it was collected. No treatment was ordered.    Cori is ready to get surgery done sooner.    Mali Rondon, BSN, RN  RN Care Coordinator, Dr. Eric CRUMP [facility]

## 2021-04-11 ENCOUNTER — ANESTHESIA EVENT (OUTPATIENT)
Dept: SURGERY | Facility: CLINIC | Age: 71
End: 2021-04-11
Payer: MEDICARE

## 2021-04-13 ENCOUNTER — AMBULATORY - HEALTHEAST (OUTPATIENT)
Dept: LAB | Facility: CLINIC | Age: 71
End: 2021-04-13

## 2021-04-13 DIAGNOSIS — Z11.59 SCREENING FOR VIRAL DISEASE: ICD-10-CM

## 2021-04-15 ENCOUNTER — TELEPHONE (OUTPATIENT)
Dept: ORTHOPEDICS | Facility: CLINIC | Age: 71
End: 2021-04-15

## 2021-04-15 ENCOUNTER — COMMUNICATION - HEALTHEAST (OUTPATIENT)
Dept: SCHEDULING | Facility: CLINIC | Age: 71
End: 2021-04-15

## 2021-04-15 NOTE — TELEPHONE ENCOUNTER
Cori called this RN to confirm OR time tomorrow.  This RN provided number to [facility] nurses and asked Cori to reach out to them.    SHANNON AlejoN, RN  RN Care Coordinator, Dr. Eric CRUMP [facility]

## 2021-04-16 ENCOUNTER — HOSPITAL ENCOUNTER (OUTPATIENT)
Facility: CLINIC | Age: 71
Discharge: HOME | End: 2021-04-16
Attending: ORTHOPAEDIC SURGERY | Admitting: ORTHOPAEDIC SURGERY
Payer: MEDICARE

## 2021-04-16 ENCOUNTER — ANESTHESIA (OUTPATIENT)
Dept: SURGERY | Facility: CLINIC | Age: 71
End: 2021-04-16
Payer: MEDICARE

## 2021-04-16 VITALS
DIASTOLIC BLOOD PRESSURE: 56 MMHG | HEIGHT: 61 IN | TEMPERATURE: 98.3 F | RESPIRATION RATE: 16 BRPM | SYSTOLIC BLOOD PRESSURE: 111 MMHG | BODY MASS INDEX: 33.84 KG/M2 | HEART RATE: 82 BPM | OXYGEN SATURATION: 98 % | WEIGHT: 179.23 LBS

## 2021-04-16 DIAGNOSIS — M79.89 SOFT TISSUE MASS: Primary | ICD-10-CM

## 2021-04-16 DIAGNOSIS — D17.9 ATYPICAL LIPOMA OF SOFT TISSUE: ICD-10-CM

## 2021-04-16 LAB — GLUCOSE BLDC GLUCOMTR-MCNC: 110 MG/DL (ref 70–99)

## 2021-04-16 PROCEDURE — 250N000011 HC RX IP 250 OP 636: Performed by: ANESTHESIOLOGY

## 2021-04-16 PROCEDURE — 999N000141 HC STATISTIC PRE-PROCEDURE NURSING ASSESSMENT: Performed by: ORTHOPAEDIC SURGERY

## 2021-04-16 PROCEDURE — 710N000012 HC RECOVERY PHASE 2, PER MINUTE: Performed by: ORTHOPAEDIC SURGERY

## 2021-04-16 PROCEDURE — 250N000013 HC RX MED GY IP 250 OP 250 PS 637: Performed by: ORTHOPAEDIC SURGERY

## 2021-04-16 PROCEDURE — 360N000077 HC SURGERY LEVEL 4, PER MIN: Performed by: ORTHOPAEDIC SURGERY

## 2021-04-16 PROCEDURE — 88307 TISSUE EXAM BY PATHOLOGIST: CPT | Mod: TC | Performed by: ORTHOPAEDIC SURGERY

## 2021-04-16 PROCEDURE — 250N000009 HC RX 250: Performed by: ORTHOPAEDIC SURGERY

## 2021-04-16 PROCEDURE — 710N000010 HC RECOVERY PHASE 1, LEVEL 2, PER MIN: Performed by: ORTHOPAEDIC SURGERY

## 2021-04-16 PROCEDURE — 250N000009 HC RX 250: Performed by: NURSE ANESTHETIST, CERTIFIED REGISTERED

## 2021-04-16 PROCEDURE — 250N000013 HC RX MED GY IP 250 OP 250 PS 637: Performed by: ANESTHESIOLOGY

## 2021-04-16 PROCEDURE — 82962 GLUCOSE BLOOD TEST: CPT

## 2021-04-16 PROCEDURE — 250N000013 HC RX MED GY IP 250 OP 250 PS 637: Performed by: NURSE ANESTHETIST, CERTIFIED REGISTERED

## 2021-04-16 PROCEDURE — 250N000011 HC RX IP 250 OP 636: Performed by: ORTHOPAEDIC SURGERY

## 2021-04-16 PROCEDURE — 258N000003 HC RX IP 258 OP 636: Performed by: NURSE ANESTHETIST, CERTIFIED REGISTERED

## 2021-04-16 PROCEDURE — 86900 BLOOD TYPING SEROLOGIC ABO: CPT | Performed by: ORTHOPAEDIC SURGERY

## 2021-04-16 PROCEDURE — 86901 BLOOD TYPING SEROLOGIC RH(D): CPT | Performed by: ORTHOPAEDIC SURGERY

## 2021-04-16 PROCEDURE — 88307 TISSUE EXAM BY PATHOLOGIST: CPT | Mod: 26 | Performed by: PATHOLOGY

## 2021-04-16 PROCEDURE — 250N000009 HC RX 250: Performed by: ANESTHESIOLOGY

## 2021-04-16 PROCEDURE — 370N000017 HC ANESTHESIA TECHNICAL FEE, PER MIN: Performed by: ORTHOPAEDIC SURGERY

## 2021-04-16 PROCEDURE — 250N000011 HC RX IP 250 OP 636: Performed by: NURSE ANESTHETIST, CERTIFIED REGISTERED

## 2021-04-16 PROCEDURE — 272N000001 HC OR GENERAL SUPPLY STERILE: Performed by: ORTHOPAEDIC SURGERY

## 2021-04-16 PROCEDURE — 250N000025 HC SEVOFLURANE, PER MIN: Performed by: ORTHOPAEDIC SURGERY

## 2021-04-16 RX ORDER — CALCIUM CARBONATE 750 MG/1
750 TABLET, CHEWABLE ORAL DAILY
COMMUNITY

## 2021-04-16 RX ORDER — EPHEDRINE SULFATE 50 MG/ML
INJECTION, SOLUTION INTRAMUSCULAR; INTRAVENOUS; SUBCUTANEOUS PRN
Status: DISCONTINUED | OUTPATIENT
Start: 2021-04-16 | End: 2021-04-16

## 2021-04-16 RX ORDER — CEFAZOLIN SODIUM 1 G/3ML
1 INJECTION, POWDER, FOR SOLUTION INTRAMUSCULAR; INTRAVENOUS SEE ADMIN INSTRUCTIONS
Status: DISCONTINUED | OUTPATIENT
Start: 2021-04-16 | End: 2021-04-16 | Stop reason: HOSPADM

## 2021-04-16 RX ORDER — SODIUM CHLORIDE, SODIUM LACTATE, POTASSIUM CHLORIDE, CALCIUM CHLORIDE 600; 310; 30; 20 MG/100ML; MG/100ML; MG/100ML; MG/100ML
INJECTION, SOLUTION INTRAVENOUS CONTINUOUS PRN
Status: DISCONTINUED | OUTPATIENT
Start: 2021-04-16 | End: 2021-04-16

## 2021-04-16 RX ORDER — IBUPROFEN 600 MG/1
600 TABLET, FILM COATED ORAL
Status: DISCONTINUED | OUTPATIENT
Start: 2021-04-16 | End: 2021-04-16 | Stop reason: HOSPADM

## 2021-04-16 RX ORDER — AMOXICILLIN 250 MG
1-2 CAPSULE ORAL 2 TIMES DAILY
Qty: 12 TABLET | Refills: 0 | Status: SHIPPED | OUTPATIENT
Start: 2021-04-16 | End: 2022-10-06

## 2021-04-16 RX ORDER — LIDOCAINE HYDROCHLORIDE 20 MG/ML
INJECTION, SOLUTION INFILTRATION; PERINEURAL PRN
Status: DISCONTINUED | OUTPATIENT
Start: 2021-04-16 | End: 2021-04-16

## 2021-04-16 RX ORDER — ONDANSETRON 4 MG/1
4 TABLET, ORALLY DISINTEGRATING ORAL
Status: DISCONTINUED | OUTPATIENT
Start: 2021-04-16 | End: 2021-04-16 | Stop reason: HOSPADM

## 2021-04-16 RX ORDER — ONDANSETRON 4 MG/1
4-8 TABLET, ORALLY DISINTEGRATING ORAL EVERY 8 HOURS PRN
Qty: 4 TABLET | Refills: 0 | Status: SHIPPED | OUTPATIENT
Start: 2021-04-16 | End: 2022-10-06

## 2021-04-16 RX ORDER — NALOXONE HYDROCHLORIDE 0.4 MG/ML
0.4 INJECTION, SOLUTION INTRAMUSCULAR; INTRAVENOUS; SUBCUTANEOUS
Status: DISCONTINUED | OUTPATIENT
Start: 2021-04-16 | End: 2021-04-16 | Stop reason: HOSPADM

## 2021-04-16 RX ORDER — KETOROLAC TROMETHAMINE 30 MG/ML
INJECTION, SOLUTION INTRAMUSCULAR; INTRAVENOUS PRN
Status: DISCONTINUED | OUTPATIENT
Start: 2021-04-16 | End: 2021-04-16

## 2021-04-16 RX ORDER — SODIUM CHLORIDE, SODIUM LACTATE, POTASSIUM CHLORIDE, CALCIUM CHLORIDE 600; 310; 30; 20 MG/100ML; MG/100ML; MG/100ML; MG/100ML
INJECTION, SOLUTION INTRAVENOUS CONTINUOUS
Status: DISCONTINUED | OUTPATIENT
Start: 2021-04-16 | End: 2021-04-16 | Stop reason: HOSPADM

## 2021-04-16 RX ORDER — ACETAMINOPHEN 325 MG/1
975 TABLET ORAL ONCE
Status: COMPLETED | OUTPATIENT
Start: 2021-04-16 | End: 2021-04-16

## 2021-04-16 RX ORDER — LIDOCAINE 40 MG/G
CREAM TOPICAL
Status: DISCONTINUED | OUTPATIENT
Start: 2021-04-16 | End: 2021-04-16 | Stop reason: HOSPADM

## 2021-04-16 RX ORDER — PROPOFOL 10 MG/ML
INJECTION, EMULSION INTRAVENOUS PRN
Status: DISCONTINUED | OUTPATIENT
Start: 2021-04-16 | End: 2021-04-16

## 2021-04-16 RX ORDER — MAGNESIUM HYDROXIDE 1200 MG/15ML
LIQUID ORAL PRN
Status: DISCONTINUED | OUTPATIENT
Start: 2021-04-16 | End: 2021-04-16 | Stop reason: HOSPADM

## 2021-04-16 RX ORDER — HYDROCODONE BITARTRATE AND ACETAMINOPHEN 5; 325 MG/1; MG/1
1 TABLET ORAL ONCE
Status: COMPLETED | OUTPATIENT
Start: 2021-04-16 | End: 2021-04-16

## 2021-04-16 RX ORDER — FENTANYL CITRATE 50 UG/ML
25-50 INJECTION, SOLUTION INTRAMUSCULAR; INTRAVENOUS EVERY 5 MIN PRN
Status: DISCONTINUED | OUTPATIENT
Start: 2021-04-16 | End: 2021-04-16 | Stop reason: HOSPADM

## 2021-04-16 RX ORDER — NALOXONE HYDROCHLORIDE 0.4 MG/ML
0.2 INJECTION, SOLUTION INTRAMUSCULAR; INTRAVENOUS; SUBCUTANEOUS
Status: DISCONTINUED | OUTPATIENT
Start: 2021-04-16 | End: 2021-04-16 | Stop reason: HOSPADM

## 2021-04-16 RX ORDER — MEPERIDINE HYDROCHLORIDE 25 MG/ML
12.5 INJECTION INTRAMUSCULAR; INTRAVENOUS; SUBCUTANEOUS
Status: DISCONTINUED | OUTPATIENT
Start: 2021-04-16 | End: 2021-04-16 | Stop reason: HOSPADM

## 2021-04-16 RX ORDER — METHOCARBAMOL 750 MG/1
750 TABLET, FILM COATED ORAL
Status: COMPLETED | OUTPATIENT
Start: 2021-04-16 | End: 2021-04-16

## 2021-04-16 RX ORDER — CEFAZOLIN SODIUM 2 G/100ML
2 INJECTION, SOLUTION INTRAVENOUS
Status: COMPLETED | OUTPATIENT
Start: 2021-04-16 | End: 2021-04-16

## 2021-04-16 RX ORDER — HYDROCODONE BITARTRATE AND ACETAMINOPHEN 5; 325 MG/1; MG/1
1-2 TABLET ORAL EVERY 4 HOURS PRN
Qty: 25 TABLET | Refills: 0 | Status: SHIPPED | OUTPATIENT
Start: 2021-04-16 | End: 2022-10-06

## 2021-04-16 RX ORDER — FENTANYL CITRATE 50 UG/ML
INJECTION, SOLUTION INTRAMUSCULAR; INTRAVENOUS PRN
Status: DISCONTINUED | OUTPATIENT
Start: 2021-04-16 | End: 2021-04-16

## 2021-04-16 RX ORDER — ONDANSETRON 2 MG/ML
4 INJECTION INTRAMUSCULAR; INTRAVENOUS EVERY 30 MIN PRN
Status: DISCONTINUED | OUTPATIENT
Start: 2021-04-16 | End: 2021-04-16 | Stop reason: HOSPADM

## 2021-04-16 RX ORDER — ONDANSETRON 2 MG/ML
INJECTION INTRAMUSCULAR; INTRAVENOUS PRN
Status: DISCONTINUED | OUTPATIENT
Start: 2021-04-16 | End: 2021-04-16

## 2021-04-16 RX ORDER — PROPOFOL 10 MG/ML
INJECTION, EMULSION INTRAVENOUS CONTINUOUS PRN
Status: DISCONTINUED | OUTPATIENT
Start: 2021-04-16 | End: 2021-04-16

## 2021-04-16 RX ORDER — HYDROCODONE BITARTRATE AND ACETAMINOPHEN 7.5; 325 MG/15ML; MG/15ML
10 SOLUTION ORAL
Status: DISCONTINUED | OUTPATIENT
Start: 2021-04-16 | End: 2021-04-16 | Stop reason: HOSPADM

## 2021-04-16 RX ORDER — HYDROMORPHONE HYDROCHLORIDE 1 MG/ML
.3-.5 INJECTION, SOLUTION INTRAMUSCULAR; INTRAVENOUS; SUBCUTANEOUS EVERY 10 MIN PRN
Status: DISCONTINUED | OUTPATIENT
Start: 2021-04-16 | End: 2021-04-16 | Stop reason: HOSPADM

## 2021-04-16 RX ORDER — ONDANSETRON 4 MG/1
4 TABLET, ORALLY DISINTEGRATING ORAL EVERY 30 MIN PRN
Status: DISCONTINUED | OUTPATIENT
Start: 2021-04-16 | End: 2021-04-16 | Stop reason: HOSPADM

## 2021-04-16 RX ORDER — TRIAMCINOLONE ACETONIDE 40 MG/ML
INJECTION, SUSPENSION INTRA-ARTICULAR; INTRAMUSCULAR PRN
Status: DISCONTINUED | OUTPATIENT
Start: 2021-04-16 | End: 2021-04-16 | Stop reason: HOSPADM

## 2021-04-16 RX ADMIN — Medication 100 MG: at 10:17

## 2021-04-16 RX ADMIN — FENTANYL CITRATE 25 MCG: 50 INJECTION, SOLUTION INTRAMUSCULAR; INTRAVENOUS at 13:20

## 2021-04-16 RX ADMIN — CEFAZOLIN 1 G: 10 INJECTION, POWDER, FOR SOLUTION INTRAVENOUS at 12:45

## 2021-04-16 RX ADMIN — Medication 5 MG: at 11:00

## 2021-04-16 RX ADMIN — LIDOCAINE HYDROCHLORIDE 100 MG: 20 INJECTION, SOLUTION INFILTRATION; PERINEURAL at 10:17

## 2021-04-16 RX ADMIN — METHOCARBAMOL 750 MG: 750 TABLET ORAL at 14:50

## 2021-04-16 RX ADMIN — SODIUM CHLORIDE, POTASSIUM CHLORIDE, SODIUM LACTATE AND CALCIUM CHLORIDE: 600; 310; 30; 20 INJECTION, SOLUTION INTRAVENOUS at 10:10

## 2021-04-16 RX ADMIN — Medication 5 MG: at 10:46

## 2021-04-16 RX ADMIN — HYDROMORPHONE HYDROCHLORIDE 0.2 MG: 1 INJECTION, SOLUTION INTRAMUSCULAR; INTRAVENOUS; SUBCUTANEOUS at 14:36

## 2021-04-16 RX ADMIN — FENTANYL CITRATE 25 MCG: 50 INJECTION, SOLUTION INTRAMUSCULAR; INTRAVENOUS at 11:01

## 2021-04-16 RX ADMIN — HYDROCODONE BITARTRATE AND ACETAMINOPHEN 1 TABLET: 5; 325 TABLET ORAL at 17:55

## 2021-04-16 RX ADMIN — HYDROMORPHONE HYDROCHLORIDE 0.3 MG: 1 INJECTION, SOLUTION INTRAMUSCULAR; INTRAVENOUS; SUBCUTANEOUS at 14:50

## 2021-04-16 RX ADMIN — POLYETHYLENE GLYCOL 400 AND PROPYLENE GLYCOL 2 DROP: 4; 3 SOLUTION/ DROPS OPHTHALMIC at 10:30

## 2021-04-16 RX ADMIN — KETOROLAC TROMETHAMINE 15 MG: 30 INJECTION, SOLUTION INTRAMUSCULAR at 13:02

## 2021-04-16 RX ADMIN — FENTANYL CITRATE 25 MCG: 50 INJECTION, SOLUTION INTRAMUSCULAR; INTRAVENOUS at 11:14

## 2021-04-16 RX ADMIN — PHENYLEPHRINE HYDROCHLORIDE 50 MCG: 10 INJECTION INTRAVENOUS at 10:49

## 2021-04-16 RX ADMIN — PROPOFOL 160 MG: 10 INJECTION, EMULSION INTRAVENOUS at 10:17

## 2021-04-16 RX ADMIN — Medication 5 MG: at 10:43

## 2021-04-16 RX ADMIN — MEPERIDINE HYDROCHLORIDE 12.5 MG: 25 INJECTION INTRAMUSCULAR; INTRAVENOUS; SUBCUTANEOUS at 14:28

## 2021-04-16 RX ADMIN — FENTANYL CITRATE 50 MCG: 50 INJECTION, SOLUTION INTRAMUSCULAR; INTRAVENOUS at 10:33

## 2021-04-16 RX ADMIN — PROPOFOL 100 MCG/KG/MIN: 10 INJECTION, EMULSION INTRAVENOUS at 10:25

## 2021-04-16 RX ADMIN — ONDANSETRON 4 MG: 2 INJECTION INTRAMUSCULAR; INTRAVENOUS at 10:15

## 2021-04-16 RX ADMIN — ACETAMINOPHEN 975 MG: 325 TABLET, FILM COATED ORAL at 09:30

## 2021-04-16 RX ADMIN — HYDROMORPHONE HYDROCHLORIDE 0.2 MG: 1 INJECTION, SOLUTION INTRAMUSCULAR; INTRAVENOUS; SUBCUTANEOUS at 15:17

## 2021-04-16 RX ADMIN — HYDROMORPHONE HYDROCHLORIDE 0.3 MG: 1 INJECTION, SOLUTION INTRAMUSCULAR; INTRAVENOUS; SUBCUTANEOUS at 15:32

## 2021-04-16 RX ADMIN — Medication 5 MG: at 10:49

## 2021-04-16 RX ADMIN — CEFAZOLIN 2 G: 10 INJECTION, POWDER, FOR SOLUTION INTRAVENOUS at 10:43

## 2021-04-16 RX ADMIN — PHENYLEPHRINE HYDROCHLORIDE 0.2 MCG/KG/MIN: 10 INJECTION INTRAVENOUS at 10:52

## 2021-04-16 ASSESSMENT — MIFFLIN-ST. JEOR: SCORE: 1265.12

## 2021-04-16 NOTE — ANESTHESIA PREPROCEDURE EVALUATION
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  Left shoulder subacromial bursa steroid injection     Past Medical History:   Diagnosis Date     Anxiety      Arrhythmia      Breast cancer (H)      Carpal tunnel syndrome      Chronic osteoarthritis      Depressive disorder      Diabetes (H) 2018     Edema      Fatigue      Gastro-oesophageal reflux disease      Heart murmur      Hyperlipaemia      Migraines      Osteoarthrosis      Palpitations      Pancreatic cancer (H)      Pancreatic disease      PONV (postoperative nausea and vomiting)      PVC's (premature ventricular contractions)      Scoliosis      Shortness of breath      Thyroid disease     Hypothyroidsim     Uncomplicated asthma     Exercise induced.       Past Surgical History:   Procedure Laterality Date     APPENDECTOMY       C HAND/FINGER SURGERY UNLISTED       C STOMACH SURGERY PROCEDURE UNLISTED       CHOLECYSTECTOMY       COLONOSCOPY       EXCISE MASS LOWER EXTREMITY Left 2014    Procedure: EXCISE MASS LOWER EXTREMITY;  Surgeon: John Reyes MD;  Location: [facility] OR     EXCISE VILLALOBOS'S NEUROMA FOOT       EXCISE SOFT TISSUE TUMOR THIGH Left 2017    Procedure: EXCISE SOFT TISSUE TUMOR THIGH;  Surgeon: John Reyes MD;  Location: [facility] OR     INNER EAR SURGERY       LUMPECTOMY BREAST       OOPHORECTOMY       partial hysterectomy       thyroidectomy       TONSILLECTOMY & ADENOIDECTOMY       WHIPPLE PROCEDURE       ZZ GASTROSCOPY,FL        Allergies   Allergen Reactions     Nuts Anaphylaxis     thickened throat     Pineapple Anaphylaxis and Other (See Comments)     Thickening in throat  thickened throat       Black Bahama Flavor Other (See Comments)     thickened throat  thickened throat     Liquid Adhesive Other (See Comments)      "Tears my skin off     Morphine Itching and Other (See Comments)     Itchiness       Morphine Hcl Itching      Social History     Tobacco Use     Smoking status: Former Smoker     Packs/day: 1.50     Years: 15.00     Pack years: 22.50     Types: Cigarettes     Smokeless tobacco: Never Used     Tobacco comment: quit 30 yrs ago   Substance Use Topics     Alcohol use: No      Wt Readings from Last 1 Encounters:   04/16/21 81.3 kg (179 lb 3.7 oz)        Anesthesia Evaluation   Pt has had prior anesthetic. Type: General.    History of anesthetic complications  - PONV.      ROS/MED HX  ENT/Pulmonary:     (+) Mild Persistent, asthma Treatment: Inhaler prn,      Neurologic:     (+) migraines,     Cardiovascular:     (+) hypertension-----valvular problems/murmurs \"leaky valve\".     METS/Exercise Tolerance: >4 METS    Hematologic:       Musculoskeletal: Comment: Osteopenia      GI/Hepatic: Comment: Gastritis    (+) GERD, Asymptomatic on medication,     Renal/Genitourinary:  - neg Renal ROS     Endo: Comment: Patient denies DM    (+) type II DM, Not using insulin, not previously admitted for DM/DKA. thyroid problem, hypothyroidism, Obesity,     Psychiatric/Substance Use:     (+) psychiatric history anxiety and depression     Infectious Disease:       Malignancy:       Other:            Physical Exam    Airway  airway exam normal      Mallampati: II   TM distance: > 3 FB   Neck ROM: full   Mouth opening: > 3 cm    Respiratory Devices and Support         Dental  no notable dental history         Cardiovascular   cardiovascular exam normal       Rhythm and rate: regular and normal     Pulmonary   pulmonary exam normal        breath sounds clear to auscultation           OUTSIDE LABS:  CBC:   Lab Results   Component Value Date    WBC 6.0 03/23/2021    WBC 7.2 11/10/2014    HGB 14.0 03/23/2021    HGB 9.3 (L) 11/10/2014    HCT 42.0 03/23/2021    HCT 29.3 (L) 11/10/2014     03/23/2021     11/10/2014     BMP:   Lab " Results   Component Value Date     03/23/2021     11/10/2014    POTASSIUM 4.5 03/23/2021    POTASSIUM 3.5 11/10/2014    CHLORIDE 109 03/23/2021    CHLORIDE 101 11/10/2014    CO2 26 03/23/2021    CO2 31 11/10/2014    BUN 15 03/23/2021    BUN 11 11/10/2014    CR 0.97 03/23/2021    CR 0.97 11/10/2014    GLC 74 03/23/2021     (H) 11/10/2014     COAGS:   Lab Results   Component Value Date    PTT 25 11/07/2014    INR 1.04 11/07/2014     POC:   Lab Results   Component Value Date     (H) 04/16/2021     HEPATIC:   Lab Results   Component Value Date    ALBUMIN 3.9 11/12/2013     OTHER:   Lab Results   Component Value Date    A1C 5.9 04/08/2014    KELLY 9.4 03/23/2021    PHOS 3.2 11/12/2013    MAG 2.2 11/12/2013    TSH 0.27 (L) 03/24/2014    T4 1.50 03/24/2014    T3 76 03/24/2014       Anesthesia Plan    ASA Status:  2      Anesthesia Type: General.     - Airway: ETT   Induction: Intravenous.   Maintenance: Balanced.        Consents    Anesthesia Plan(s) and associated risks, benefits, and realistic alternatives discussed. Questions answered and patient/representative(s) expressed understanding.     - Discussed with:  Patient         Postoperative Care    Pain management: IV analgesics, Oral pain medications.   PONV prophylaxis: Ondansetron (or other 5HT-3), Dexamethasone or Solumedrol, Background Propofol Infusion (TIVA)     Comments:                Remy Keen MD

## 2021-04-16 NOTE — DISCHARGE INSTRUCTIONS
[facility]  Same-Day Surgery   Adult Discharge Orders & Instructions     For 24 hours after surgery    1. Get plenty of rest.  A responsible adult must stay with you for at least 24 hours after you leave the hospital.   2. Do not drive or use heavy equipment.  If you have weakness or tingling, don't drive or use heavy equipment until this feeling goes away.  3. Do not drink alcohol.  4. Avoid strenuous or risky activities.  Ask for help when climbing stairs.   5. You may feel lightheaded.  IF so, sit for a few minutes before standing.  Have someone help you get up.   6. If you have nausea (feel sick to your stomach): Drink only clear liquids such as apple juice, ginger ale, broth or 7-Up.  Rest may also help.  Be sure to drink enough fluids.  Move to a regular diet as you feel able.  7. You may have a slight fever. Call the doctor if your fever is over 100 F (37.7 C) (taken under the tongue) or lasts longer than 24 hours.  8. You may have a dry mouth, a sore throat, muscle aches or trouble sleeping.  These should go away after 24 hours.  9. Do not make important or legal decisions.   Call your doctor for any of the followin.  Signs of infection (fever, growing tenderness at the surgery site, a large amount of drainage or bleeding, severe pain, foul-smelling drainage, redness, swelling).    2. It has been over 8 to 10 hours since surgery and you are still not able to urinate (pass water).    3.  Headache for over 24 hours.    4.  Numbness, tingling or weakness the day after surgery (if you had spinal anesthesia).  To contact a doctor, call ________________________________________ or:        [phone] and ask for the resident on call for   ______________________________________________ (answered 24 hours a day)      Emergency Department:    [facility]: [phone]       (TTY for hearing impaired: [phone])    [facility]: [phone]       (TTY for  hearing impaired: [phone])    Caring for Your Incision    You ll need to care for your incision after surgery and certain medical procedures. To close an incision, your doctor used sutures (stitches), steri-strips, staples, or dermabond. Follow the tips on this sheet to help heal and prevent infection of your incision.   Types of Incision Closure:    Surgical Sutures (stitches) are placed by sewing the edges of an incision together with surgical thread. Sutures are either absorbable or non-absorbable. Absorbable sutures break down in the body over time. Non-absorbable sutures need to be removed.     Steri-strips are made of adhesive (sticky) material to help hold the edges of an incision together. Steri-strips usually fall off by themselves in 7 to 10 days.     Surgical Staples are made or steel or titanium. They are often used to close shallow incisions. They are not used on certain body areas, such as the face and hands. This is because these areas have nerves that are close to the surface. Staples are usually removed within a week.     Dermabond (skin glue) is used to close a cut or small incision. The skin glue is less painful than stitches (sutures). In some cases, a lower layer of skin may be sutured before Dermabond is applied. The skin glue closes the cut/incision within a few minutes. It also provides a water-resistant covering. No bandage is required. Dermabond peels off on its own within 5 to 10 days.  Home Care for Your  Incision:    Keep the incision clean and dry. You should bathe only as directed by the doctor. It is okay to wash around the incision, but don t spray water directly on it.     Check the incision site daily for pain, redness, drainage, swelling, or separation of the incision edges.     If you have a dressing over the incision, change the dressing as directed by the doctor.    Make sure any clothing that touches the incision is loose fitting. This will prevent rubbing. If the  incision is on the head, avoid wearing caps or other head coverings. These may rub against the incision.    Avoid rough play, contact sports, or physical activities. This can put you at risk of opening an incision.     As your incision heals, the skin may appear pink or red. It may also feel slightly bumpy or raised. This is called a healing ridge. Over time, the color should fade and the raised skin will become less noticeable.   Call the doctor right away if you have any of the following:    Increased pain, redness, drainage, swelling or bleeding at the incision site    Numbness, coldness or tingling around the incision site    Fever of 101 F degrees or higher  Rev. 4/2014

## 2021-04-16 NOTE — OR NURSING
Pt started shaking again so I gave 12.5 dilaudid IV and the shaking stopped within a minute. Dr. Keen here to evaluate patient.

## 2021-04-16 NOTE — ANESTHESIA POSTPROCEDURE EVALUATION
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    Anesthesia Type:  General    Note:  Disposition: Inpatient   Postop Pain Control: Uneventful            Sign Out: Well controlled pain   PONV: No   Neuro/Psych: Uneventful            Sign Out: Acceptable/Baseline neuro status   Airway/Respiratory: Uneventful            Sign Out: Acceptable/Baseline resp. status   CV/Hemodynamics: Uneventful            Sign Out: Acceptable CV status   Other NRE: NONE   DID A NON-ROUTINE EVENT OCCUR? No         Last vitals:  Vitals:    04/16/21 1445 04/16/21 1500 04/16/21 1515   BP: 116/63 116/61 108/55   Pulse: 69 72 75   Resp: 14 19 16   Temp:  36.7  C (98.1  F)    SpO2: 97% 95% 96%       Last vitals prior to Anesthesia Care Transfer:  CRNA VITALS  4/16/2021 1328 - 4/16/2021 1428      4/16/2021             NIBP:  134/71    Pulse:  73    Temp:  36.6  C (97.9  F)    SpO2:  100 %    Resp Rate (observed):  12          Electronically Signed By: Rashida Foley MD  April 16, 2021  3:31 PM

## 2021-04-16 NOTE — BRIEF OP NOTE
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  Complications: None.  Implants: * No implants in log *      POSTOP PLAN:  SDS  ADAT  WB status: WBAT LLE  Antibiotics: preop ancef  Dressing: Keep c/d/i x7 days, then remove. Leave steri strips in place. OK to shower after dressing removed if there is no drainage or wound concerns.  Elevate/ice operative extremity  Pain: Oxycodone, tylenol and NSAID PRN  Pathology: follow results closely  Follow up: 5/13/21  Dispo: Ok to discharge once meets criteria in PACU    Zack Rodarte MD  Orthopaedic Surgery, PGY4  [phone]

## 2021-04-16 NOTE — ANESTHESIA CARE TRANSFER NOTE
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    Anesthesia Type:   General     Note:    Oropharynx: oropharynx clear of all foreign objects  Level of Consciousness: drowsy  Oxygen Supplementation: face mask  Level of Supplemental Oxygen (L/min / FiO2): 6  Independent Airway: airway patency satisfactory and stable  Dentition: dentition unchanged  Vital Signs Stable: post-procedure vital signs reviewed and stable  Report to RN Given: handoff report given  Patient transferred to: PACU  Comments: To PACU with 02, Spont RR. Monitors applied, VSS, PIV/airway patent, Report to RN all questions/concerns answered.     Handoff Report: Identifed the Patient, Identified the Reponsible Provider, Reviewed the pertinent medical history, Discussed the surgical course, Reviewed Intra-OP anesthesia mangement and issues during anesthesia, Set expectations for post-procedure period and Allowed opportunity for questions and acknowledgement of understanding      Vitals: (Last set prior to Anesthesia Care Transfer)  CRNA VITALS  4/16/2021 1328 - 4/16/2021 1407      4/16/2021             NIBP:  134/71    Pulse:  73    Temp:  36.6  C (97.9  F)    SpO2:  100 %    Resp Rate (observed):  12        Electronically Signed By: JACKSON Hoskins CRNA  April 16, 2021  2:07 PM

## 2021-04-16 NOTE — ANESTHESIA PROCEDURE NOTES
Airway       Patient location during procedure: OR  Staff -        Anesthesiologist:  Remy Keen MD       CRNA: Suly Ptael APRN CRNA       Performed By: CRNA  Consent for Airway        Urgency: elective  Indications and Patient Condition       Indications for airway management: esperanza-procedural       Induction type:intravenous       Mask difficulty assessment: 1 - vent by mask    Final Airway Details       Final airway type: endotracheal airway       Successful airway: ETT - single and Oral  Endotracheal Airway Details        ETT size (mm): 7.0       Cuffed: yes       Successful intubation technique: video laryngoscopy (pt reported extended sore thoat after last procedure)       VL Blade Size: MAC 4       Grade View of Cords: 1       Adjucts: stylet       Position: Right       Measured from: lips       Secured at (cm): 21       Bite block used: Soft    Post intubation assessment        Placement verified by: capnometry, equal breath sounds and chest rise        Number of attempts at approach: 1       Secured with: silk tape       Ease of procedure: easy       Dentition: Intact and Unchanged    Medication(s) Administered   Medication Administration Time: 4/16/2021 10:24 AM

## 2021-04-16 NOTE — OR NURSING
"Pt shaking after being settled. Temp normal. Xiomy Hugger on at high temp./ but still warming up. Warm blankets put around the top of her head and on her lower legs and feet. After applying blankets, pt stated , \" That feels good\". She has since stopped shaking.   "

## 2021-04-17 ENCOUNTER — NURSE TRIAGE (OUTPATIENT)
Dept: NURSING | Facility: CLINIC | Age: 71
End: 2021-04-17

## 2021-04-17 NOTE — TELEPHONE ENCOUNTER
Pt is calling in about a post op problem from surgery she had yesterday. Pt had a excision of a tumor on her left thigh. Pt is calling to report a sac of blood that is collecting at the bottom of the bandage, and it needs to be changed. Patient does not have any place to change this where it is sanitary. Pt reports the bandage has plastic on the outside, and the blood is collecting in a pocket in the plastic. Pt also thinks the bleeding may be too much. Pt was told it would ooze, but this is more than oozing. Pt also reports pain, but is on pain medications. Pt would like to go to the clinic to have the dressing changed, and have this evaluated.   On call doctor for Dr. Reyes, (Dr Alcazar) was paged at 416 pm. On call resident (Du Novoa) called back and encouraged patient to change dressing at home, so that they are able to determine amount of bleeding by keeping track of how often she is saturating the dressing. .  Patient was called back to relay message from resident. Pt agrees with plan, has some dressings from the hospital, and will do the dressing change at home. Pt was advised to call back if bleeding increases, or if she saturates the dressing 1-2 times, or more than every 4 hours. Pt also should call back if she notices the incision is gaping open, or is not intact. Pt verbalized understanding.     Ortiz Chandler RN on 4/17/2021 at 4:06 PM    Reason for Disposition    Dressing soaked with blood or body fluid (e.g., drainage)    [1] Caller has URGENT question AND [2] triager unable to answer question    Additional Information    Negative: Severe pain in the incision    Negative: [1] Incision gaping open AND [2] < 48 hours since wound re-opened    Negative: [1] Incision gaping open AND [2] length of opening > 2 inches (5 cm)    Negative: Patient sounds very sick or weak to the triager    Negative: Sounds like a serious complication to the triager    Negative: Fever > 100.4 F (38.0 C)    Negative: [1]  Incision looks infected (spreading redness, pain) AND [2] fever > 99.5 F (37.5 C)    Negative: [1] Incision looks infected (spreading redness, pain) AND [2] large red area (> 2 in. or 5 cm)    Negative: [1] Incision looks infected (spreading redness, pain) AND [2] face wound    Negative: [1] Red streak runs from the incision AND [2] longer than 1 inch (2.5 cm)    Negative: [1] Pus or bad-smelling fluid draining from incision AND [2] no fever    Negative: [1] Post-op pain AND [2] not controlled with pain medications    Negative: [1] Major abdominal surgical incision AND [2] wound gaping open AND [3] visible internal organs    Negative: Sounds like a life-threatening emergency to the triager    Negative: [1] Bleeding from incision AND [2] won't stop after 10 minutes of direct pressure    Negative: [1] Widespread rash AND [2] bright red, sunburn-like    Negative: [1] Raised bruise and [2] size > 2 inches (5 cm) and expanding    Protocols used: POST-OP INCISION SYMPTOMS-A-AH

## 2021-04-18 ENCOUNTER — TELEPHONE (OUTPATIENT)
Dept: ORTHOPEDICS | Facility: CLINIC | Age: 71
End: 2021-04-18

## 2021-04-18 NOTE — TELEPHONE ENCOUNTER
The patient called in overnight with concerns about wound drainage.  She had surgery with Dr. Toth on Friday.  She reports she changed her dressing yesterday evening for the first time since surgery because there is a lot of saturation on the bandages.  She since has had to change the dressing 2 more times over a 4 to 5-hour time period.  She reports there is an area at the junction of her buttock and thigh that seems to be splitting open some.  She reports this is where the drainage is coming from and it is hard to get a pressure bandage or an Ace wrap on this area to hold it in place.  She and her  have tried Steri-Strips, and different types of dressings in order to try to accommodate this.  They are wondering what next steps can be.    We discussed that coming to the emergency department for a wound check and placement of a new dressing would be an option.  I discussed that they could come to [facility] for this and we could evaluate the wound and place a new dressing to see what accommodations for the dressing to be made going forward.  The patient lives in [place] and was wondering if she could go to [facility] as this is much closer to her house in its middle the night.  We discussed that this would be fine if they are just going to go for a wound check and dressing support.  If the team at [facility] has any concerns they can reach out to our team or the patient can come to [facility] for further evaluation and management.    All the patient and her 's questions were answered.    Du Novoa MD  PGY-4  Orthopaedic Surgery

## 2021-04-18 NOTE — TELEPHONE ENCOUNTER
Janey reports that she continues to bleed from her LLE surgical incision. She has changed the dressings 4 times.    Blood is leaking down her leg and onto floor  Part of the wound is actively bleeding  He leg is currently wrapped with towels and ace bandages.    Notified that on-call provider would be paged. Call back if no provider contact within 20-30 minutes or Consider ER.    11:52 pm - spoke to page , Leslie  11:57 pm - Page sent to on-call provider, Dr. Du Novoa    12:33 am - attempted to contact pt to see if she'd heard from on-call provider. Left msg to call Nurse Triage if still in need of assistance.      Anastasiia Rojas RN  [facility]

## 2021-04-19 ENCOUNTER — TELEPHONE (OUTPATIENT)
Dept: ORTHOPEDICS | Facility: CLINIC | Age: 71
End: 2021-04-19

## 2021-04-19 LAB — COPATH REPORT: NORMAL

## 2021-04-19 NOTE — TELEPHONE ENCOUNTER
Returned call to Cori after message left.    Cori was following up after being seen in the ER this weekend.    This RN spoke with her  and her. Ace wrap compression applied to leg, bleeding stopped early Sunday am.    This RN discussed leaving the compression in place for atleast 48hrs as long as her foot is warm, good circulation, then potentially removing ace wrap and assessing the incision/leg.    This RN discussed leaving the dressing in place for atleast 7 days.    Cori's  Itz verbalized understanding of plan of care.    This RN encouraged them to call with further questions.    SHANNON AlejoN, RN  RN Care Coordinator, Dr. Eric CRUMP [facility] Orthopedic [facility]

## 2021-04-23 ENCOUNTER — TELEPHONE (OUTPATIENT)
Dept: ORTHOPEDICS | Facility: CLINIC | Age: 71
End: 2021-04-23

## 2021-04-23 NOTE — TELEPHONE ENCOUNTER
----- Message from Harriet Norris MA sent at 4/22/2021  3:49 PM CDT -----  Regarding: Post op change  Cori called and said she was told she needed a 2 week in person visit for her post op. Surgery was 4/16. Can one of you help me and call her to reschedule the post op she currently has?    Thanks!

## 2021-04-23 NOTE — TELEPHONE ENCOUNTER
Returned call to Cori,  Set up for in-person visit on 5/3.  All questions answered.    SHANNON AlejoN, RN  RN Care Coordinator, Dr. Eric CRUMP [facility] Orthopedic [facility]

## 2021-05-03 ENCOUNTER — OFFICE VISIT (OUTPATIENT)
Dept: ORTHOPEDICS | Facility: CLINIC | Age: 71
End: 2021-05-03
Payer: MEDICARE

## 2021-05-03 ENCOUNTER — TELEPHONE (OUTPATIENT)
Dept: ORTHOPEDICS | Facility: CLINIC | Age: 71
End: 2021-05-03

## 2021-05-03 DIAGNOSIS — G57.02 NEUROPATHY, SCIATIC, LEFT: Primary | ICD-10-CM

## 2021-05-03 DIAGNOSIS — G57.02 NEUROPATHY, SCIATIC, LEFT: ICD-10-CM

## 2021-05-03 PROCEDURE — 99024 POSTOP FOLLOW-UP VISIT: CPT | Performed by: ORTHOPAEDIC SURGERY

## 2021-05-03 RX ORDER — OMEGA-3 FATTY ACIDS/FISH OIL 300-1000MG
400 CAPSULE ORAL EVERY 4 HOURS PRN
COMMUNITY
End: 2024-09-20

## 2021-05-03 RX ORDER — GABAPENTIN 300 MG/1
300 CAPSULE ORAL 3 TIMES DAILY
Qty: 30 CAPSULE | Refills: 3 | Status: SHIPPED | OUTPATIENT
Start: 2021-05-03 | End: 2021-05-03

## 2021-05-03 RX ORDER — GABAPENTIN 300 MG/1
300 CAPSULE ORAL 3 TIMES DAILY
Qty: 30 CAPSULE | Refills: 3 | Status: SHIPPED | OUTPATIENT
Start: 2021-05-03 | End: 2024-02-26

## 2021-05-03 NOTE — TELEPHONE ENCOUNTER
Dr. Reyes did script to local print, re-done to e-scribe    Mali Rondon, SHANNONN, RN  RN Care Coordinator, Dr. Reyes  [facility] Orthopedic [facility]       Statement Selected

## 2021-05-03 NOTE — PROGRESS NOTES
[facility], Orthopaedic Oncology Surgery Consultation  by John Reyes M.D.    Janey Bo MRN# [id number]   Age: 68 year old YOB: 1950     Requesting physician: Dr. Jerod Redding, med oncology, [facility]  Dr. David Gaines, general surgery     Dx:    1. 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  3. 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  4. H/o thyroid CA, breast CA, pancreatic CA    Procedures:  1. 11/07/14, Left thigh mass marginal excision (Eric)  2. 1/20/2017, Excision L thigh atypical lipomatous tumor 7 cm, deep. (Eric) [facility]  3. Jan-March 2021, External beam RT 50 Gy L thigh (Dixie) [facility]  4. 4/16/2021, Intralesional excision, L thigh intraneural atypical lipomatous tumor (Eric) [facility]      I met with Viv today to review her postoperative status and recovery.  Findings at the time of surgery revealed extensive intraneural atypical lipomatous tumor.  It was impossible to remove the entire tumor without sacrificing her sciatic nerve function completely.  Therefore an intralesional subtotal excision was performed.  I  informed the patient that there is gross tumor remaining.    She complained of dysesthesias along the lateral border of her foot.  She does not notice any weakness in dorsiflexion or plantarflexion.  She uses a walker to assist with her balance and her upper extremity disability.    Examination:  Incision is well-healed without drainage.  Neurologic examination reveals she has dorsiflexion tibialis anterior 4+/5, EHL 4/5, plantar flexion gastrosoleus 5/5.  Dysesthesias on the lateral border of her foot.    Impression:  I discussed with Viv the guarded prognosis and the likelihood that she will develop continued local growth of her atypical lipomatous tumor.  The intraneural extent and dissemination of the tumor within her sciatic nerve  precludes complete excision without sacrificing the complete sciatic nerve.  At this time she is not yet ready to consent to this procedure.    Plan:  1.  Gabapentin trial for her dysesthesias symptoms.  I suggested that he try this just at nighttime only and then twice daily or 3 times daily if this is not working she may double dose.  We discussed the potential side effects and potential for dependency as well.  2.  Baseline MRI imaging in 6 months time postoperatively with visit at that time as well.    John Reyes MD  [facility] Family Professor  Oncology and Adult Reconstructive Surgery  Dept Orthopaedic Surgery, [facility]  [phone] office, [phone] pager  [email or web address]

## 2021-05-03 NOTE — NURSING NOTE
Chief Complaint   Patient presents with     Surgical Followup     POP F/u Excision of intraneural lipomatous tumor Left posterior thigh - Left and Left shoulder subacromial bursa steroid injection - Left DOS: 04/16/2021       71 year old  1950        [place], [place] - [address]        Allergies   Allergen Reactions     Nuts Anaphylaxis     thickened throat     Pineapple Anaphylaxis and Other (See Comments)     Thickening in throat  thickened throat       Black [place] Flavor Other (See Comments)     thickened throat  thickened throat     Liquid Adhesive Other (See Comments)     Tears my skin off     Morphine Itching and Other (See Comments)     Itchiness       Morphine Hcl Itching           Current Outpatient Medications   Medication     albuterol (PROAIR HFA/PROVENTIL HFA/VENTOLIN HFA) 108 (90 Base) MCG/ACT inhaler     amylase-lipase-protease (CREON 24) 64570-97573 units CPEP per EC capsule     biotin 5 MG CAPS     buPROPion (WELLBUTRIN SR) 200 MG 12 hr tablet     calcium carbonate 750 MG CHEW     Calcium-Magnesium-Vitamin D (CALCIUM MAGNESIUM PO)     cholecalciferol (VITAMIN D3) 10 mcg (400 units) TABS tablet     DilTIAZem HCl Coated Beads (DILTIAZEM CD PO)     escitalopram (LEXAPRO) 10 MG tablet     ibuprofen (ADVIL/MOTRIN) 200 MG capsule     levothyroxine (SYNTHROID/LEVOTHROID) 150 MCG tablet     methylphenidate (RITALIN) 10 MG tablet     metroNIDAZOLE (METROGEL) 0.75 % external gel     NAPROXEN SODIUM PO     potassium chloride ER (KLOR-CON M) 20 MEQ CR tablet     Probiotic Product (ALIGN) CAPS     sucralfate (CARAFATE) 1 GM/10ML suspension     tiZANidine (ZANAFLEX) 2 MG tablet     VITAMIN E BLEND PO     aspirin 81 MG tablet     butalbital-aspirin-caffeine (FIORINAL) -40 MG capsule     HYDROcodone-acetaminophen (NORCO) 5-325 MG tablet     ondansetron (ZOFRAN-ODT) 4 MG ODT tab     raloxifene (EVISTA) 60 MG tablet     senna-docusate (SENOKOT-S/PERICOLACE) 8.6-50 MG tablet      No current facility-administered medications for this visit.

## 2021-05-03 NOTE — TELEPHONE ENCOUNTER
Cori called this RN, call returned.  Gabapentin script did not e-scribe.    This RN found the script, local print.  This RN had Dr. Reyes sign the gabapentin script.    This RN called [facility] and confirmed [phone].  This RN called pharmacy, confirmed they received script [phone].  Pharmacist was filling script as I spoke.    This RN returned call to Cori to notify her that the gabapentin would be ready to be picked up.    Cori verbalized understanding.    Mali Rondon, SHANNONN, RN  RN Care Coordinator, Dr. Reyes  [facility] Orth[facility]

## 2021-05-03 NOTE — LETTER
5/3/2021         RE: Janey Bo  [address] [address]        Dear Colleague,    Thank you for referring your patient, Janey Bo, to the [facility] [place]. Please see a copy of my visit note below.        [facility], Orthopaedic Oncology Surgery Consultation  by John Reyes M.D.    Janey Bo MRN# [id number]   Age: 68 year old YOB: 1950     Requesting physician: Dr. Jerod Redding, med oncology, [facility]  Dr. David Gaines, general surgery     Dx:    1. 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  3. 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  4. H/o thyroid CA, breast CA, pancreatic CA    Procedures:  1. 11/07/14, Left thigh mass marginal excision (Eric)  2. 1/20/2017, Excision L thigh atypical lipomatous tumor 7 cm, deep. (Eric) [facility]  3. Jan-March 2021, External beam RT 50 Gy L thigh (Dixie) [facility]  4. 4/16/2021, Intralesional excision, L thigh intraneural atypical lipomatous tumor (Eric) [facility]      I met with Viv today to review her postoperative status and recovery.  Findings at the time of surgery revealed extensive intraneural atypical lipomatous tumor.  It was impossible to remove the entire tumor without sacrificing her sciatic nerve function completely.  Therefore an intralesional subtotal excision was performed.  I  informed the patient that there is gross tumor remaining.    She complained of dysesthesias along the lateral border of her foot.  She does not notice any weakness in dorsiflexion or plantarflexion.  She uses a walker to assist with her balance and her upper extremity disability.    Examination:  Incision is well-healed without drainage.  Neurologic examination reveals she has dorsiflexion tibialis anterior 4+/5, EHL 4/5, plantar flexion gastrosoleus 5/5.  Dysesthesias  on the lateral border of her foot.    Impression:  I discussed with Viv the guarded prognosis and the likelihood that she will develop continued local growth of her atypical lipomatous tumor.  The intraneural extent and dissemination of the tumor within her sciatic nerve precludes complete excision without sacrificing the complete sciatic nerve.  At this time she is not yet ready to consent to this procedure.    Plan:  1.  Gabapentin trial for her dysesthesias symptoms.  I suggested that he try this just at nighttime only and then twice daily or 3 times daily if this is not working she may double dose.  We discussed the potential side effects and potential for dependency as well.  2.  Baseline MRI imaging in 6 months time postoperatively with visit at that time as well.    John Reyes MD  [facility] Family Professor  Oncology and Adult Reconstructive Surgery  Dept Orthopaedic Surgery, [facility]  [phone] office, [phone] pager  [email or web address]

## 2021-05-09 ENCOUNTER — HEALTH MAINTENANCE LETTER (OUTPATIENT)
Age: 71
End: 2021-05-09

## 2021-05-12 NOTE — OP NOTE
[facility] Orthopaedic Surgery  Operative Note            Procedure date May 11, 2021   Pre-operative diagnosis: Atypical lipoma of soft tissue [D17.9]   Post-operative diagnosis Same as pre-op   Procedure: 1.  Excision of multiple recurrent atypical lipomatous tumors of left posterior thigh.  Deep, 5 x 3 cm and 4.4 x 2.6 cm   2.  Decompressive neuroplasty of left sciatic nerve  3.  Left shoulder subacromial bursa steroid injection   Surgeon: John Reyes MD   Assistants(s): Erik Parsons, DO Zack Rodarte MD   Anesthesia: General    Estimated blood loss: Minimal   Total IV fluids: (See anesthesia record)   Blood transfusion: (See anesthesia record)   Total urine output: (See anesthesia record)   Drains: None   Specimens: ID Type Source Tests Collected by Time Destination   A : LEFT POSTERIOR THIGH  ATYPICAL LIPOMA Tissue Leg, left SURGICAL PATHOLOGY EXAM John Reyes MD 4/16/2021 11:57 AM         Procedure details:  The patient was placed on the operating table in supine position.  After induction of general anesthesia I first addressed the left shoulder joint.  The left posterior shoulder was prepped and draped and a an injection of Celestone Soluspan, 12 mg with 5 cc of 1% Xylocaine was infiltrated into the left subacromial bursa area.  There were no complications.    The patient was now turned in the prone position and the left lower sternal he was prepped and draped in sterile manner.  Due to the proximal location of the tumor tourniquet was not possible.  I proceeded with incising the previous incision.  The patient had previously had multiple prior excisions as well as radiation treatment nonetheless, had multiple recurrent soft tissue nodules.  I had to dissect through a scarred tissue plane to separate the muscles and the nurse, mainly the sciatic nerve and its various branches.  I made every attempt to preserve the branches and main trunk  of the sciatic nerve.  Nonetheless, the mass was found to be present both surrounding and within the sciatic nerve.  This accounted for symptoms that the peers patient was experiencing.    Decompressive neuroplasty is performed by incising the sciatic nerve and dissecting within the fascicles of the sciatic nerve to try and separate the tumor mass from fascicles.  The majority the tumor mass was separately dissected in this way.  Nonetheless the dissection was quite tedious and very difficult.  I was concerned that there would be complete loss of sciatic nerve function due to the attenuated nature of the nerve fibers.    The extradural portions of the mass now I also proceeded to excise from the surrounding soft tissues.  Sharp and blunt dissection were used to isolate the mass and excise it.    There were some remaining portions of the tumor no grossly visible that cannot be  from the elements of the sciatic nerve due to the risk of neurologic injury.  Patient has expressed a preference to preserve neurologic function over obtaining complete tumor excision.  I did not feel possible to perform a complete tumor excision without sacrificing the sciatic nerve.  Therefore this was not performed.    Hemostasis was secured.  The wound was then closed in layers with standard technique and a compressive dressing was applied.  The 22 modifier is appended due to the patient's    Complex history of multiple prior operations and radiation creating a very difficult surgical procedure where the dissection was tedious, high risk and complicated due to the scarred nature of the recurrent tumor within the sciatic nerve.    MD Brianna Sommers Family Professor  Oncology and Adult Reconstructive Surgery  Dept Orthopaedic Surgery, [facility]  [phone] office, [phone] pager  [email or web address]

## 2021-07-30 ENCOUNTER — ANCILLARY PROCEDURE (OUTPATIENT)
Dept: ULTRASOUND IMAGING | Facility: CLINIC | Age: 71
End: 2021-07-30
Attending: ORTHOPAEDIC SURGERY
Payer: MEDICARE

## 2021-07-30 ENCOUNTER — TELEPHONE (OUTPATIENT)
Dept: ORTHOPEDICS | Facility: CLINIC | Age: 71
End: 2021-07-30

## 2021-07-30 DIAGNOSIS — C49.22: ICD-10-CM

## 2021-07-30 DIAGNOSIS — R60.0 LOCALIZED EDEMA: ICD-10-CM

## 2021-07-30 DIAGNOSIS — C49.22: Primary | ICD-10-CM

## 2021-07-30 PROCEDURE — 93971 EXTREMITY STUDY: CPT | Mod: LT | Performed by: RADIOLOGY

## 2021-07-30 NOTE — TELEPHONE ENCOUNTER
Returned call after voicemail left.  Cori discussed that she has had swelling in her thigh since surgery, now the swelling is extending into her calf/foot and she c/o os soreness her her calf when she flexes her left foot.  Cori does not have a primary physician she could see.  This RN discussed concerns with DVT in that left leg, have sent message to Dr. Reyes, will follow up with her.    OBEY Alejo, RN  RN Care Coordinator, Dr. Reyes  [facility]

## 2021-07-30 NOTE — TELEPHONE ENCOUNTER
Returned call to Cori.  This RN set Cori up for an US this afternoon to rule out any LE clots in her left leg due to pain in calf and recent changes.  This RN also set up Cori for a video visit on Monday with Dr. Reyes to assess left leg if the US in negative to provide further direction to Cori.    Cori in agreement with this plan. Encouraged Cori to call with questions/concerns/changes.    OBEY Alejo, RN  RN Care Coordinator, Dr. Reyes  [facility] Orthopedic [facility]

## 2021-08-02 ENCOUNTER — TELEPHONE (OUTPATIENT)
Dept: ORTHOPEDICS | Facility: CLINIC | Age: 71
End: 2021-08-02

## 2021-08-02 ENCOUNTER — VIRTUAL VISIT (OUTPATIENT)
Dept: ORTHOPEDICS | Facility: CLINIC | Age: 71
End: 2021-08-02
Payer: MEDICARE

## 2021-08-02 DIAGNOSIS — G57.02 NEUROPATHY, SCIATIC, LEFT: ICD-10-CM

## 2021-08-02 DIAGNOSIS — C49.22: Primary | ICD-10-CM

## 2021-08-02 PROCEDURE — 99213 OFFICE O/P EST LOW 20 MIN: CPT | Mod: 95 | Performed by: ORTHOPAEDIC SURGERY

## 2021-08-02 NOTE — TELEPHONE ENCOUNTER
Returned call to Cori after clinic.  Lymphedema order placed.  Phone number given to Cori to call with questions/or if she needs assistance with the lymphedema referal.    All questions answered.    SHNANON AlejoN, RN  RN Care Coordinator, Dr. Eric CRUMP [facility] Orthopedic [facility]

## 2021-08-02 NOTE — NURSING NOTE
Chief Complaint   Patient presents with     RECHECK     discuss swelling of leg       71 year old  1950    There were no vitals taken for this visit.    Date/Surgery/Surgeon/Hospital:  1. 4/16/2021 / Excision of intraneural lipomatous tumor Left posterior thigh / Dr. John Reyes MD      Pain Assessment  Patient Currently in Pain: Yes  0-10 Pain Scale: 5 (posterior thigh, about a 5 increases with sitting)  Primary Pain Location: Hip (left leg is swollen from top of leg to middle of foot)          [place], [place] - [address]        Allergies   Allergen Reactions     Nuts Anaphylaxis     thickened throat     Pineapple Anaphylaxis and Other (See Comments)     Thickening in throat  thickened throat       Black [place] Flavor Other (See Comments)     thickened throat  thickened throat     Liquid Adhesive Other (See Comments)     Tears my skin off     Morphine Itching and Other (See Comments)     Itchiness       Morphine Hcl Itching           Current Outpatient Medications   Medication     albuterol (PROAIR HFA/PROVENTIL HFA/VENTOLIN HFA) 108 (90 Base) MCG/ACT inhaler     amylase-lipase-protease (CREON 24) 21542-70016 units CPEP per EC capsule     aspirin 81 MG tablet     biotin 5 MG CAPS     buPROPion (WELLBUTRIN SR) 200 MG 12 hr tablet     butalbital-aspirin-caffeine (FIORINAL) -40 MG capsule     calcium carbonate 750 MG CHEW     Calcium-Magnesium-Vitamin D (CALCIUM MAGNESIUM PO)     cholecalciferol (VITAMIN D3) 10 mcg (400 units) TABS tablet     DilTIAZem HCl Coated Beads (DILTIAZEM CD PO)     escitalopram (LEXAPRO) 10 MG tablet     gabapentin (NEURONTIN) 300 MG capsule     HYDROcodone-acetaminophen (NORCO) 5-325 MG tablet     ibuprofen (ADVIL/MOTRIN) 200 MG capsule     levothyroxine (SYNTHROID/LEVOTHROID) 150 MCG tablet     methylphenidate (RITALIN) 10 MG tablet     metroNIDAZOLE (METROGEL) 0.75 % external gel     NAPROXEN SODIUM PO     ondansetron (ZOFRAN-ODT) 4 MG ODT tab      potassium chloride ER (KLOR-CON M) 20 MEQ CR tablet     Probiotic Product (ALIGN) CAPS     raloxifene (EVISTA) 60 MG tablet     senna-docusate (SENOKOT-S/PERICOLACE) 8.6-50 MG tablet     sucralfate (CARAFATE) 1 GM/10ML suspension     tiZANidine (ZANAFLEX) 2 MG tablet     VITAMIN E BLEND PO     No current facility-administered medications for this visit.

## 2021-08-02 NOTE — PROGRESS NOTES
[facility], Orthopaedic Oncology Surgery Consultation  by John Reyes M.D.    Janey Bo MRN# [id number]   Age: 68 year old YOB: 1950     Requesting physician: Dr. Jerod Redding, med oncology, [facility]  Dr. David Gaines, general surgery     Dx:    1. 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  3. 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  4. H/o thyroid CA, breast CA, pancreatic CA    Procedures:  1. 11/07/14, Left thigh mass marginal excision (Eric)  2. 1/20/2017, Excision L thigh atypical lipomatous tumor 7 cm, deep. (Eric) [facility]  3. Jan-March 2021, External beam RT 50 Gy L thigh (Dixie) [facility]  4. 4/16/2021, Intralesional excision, L thigh intraneural atypical lipomatous tumor (Eric) [facility]      I had a virtual visit with Viv schulte and reviewed the issue regarding the recurrent swelling of her left lower extremity.  An ultrasound examination was performed is negative for deep venous thrombosis.  I was able to review her extremity and indeed there is some increase in size being asymmetrical with the contralateral extremity.  She reports that her gait is normal with minimal limp.  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  It is difficult to appreciate whether or not the swelling is due to edema or other issues.  Clearly it is asymmetric.    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    Plan:  Referral to lymphedema therapist in her local vicinity.  I will ask our nurse Mali to arrange.  Working on range of motion, swelling and local control measures may be  "appropriate.    A baseline MRI examination should be obtained of her left thigh in 4 to 6 months.  I mentioned my concerns regarding the persistent tumor within the sciatic nerve itself.  Any further surgical resection likely will result in complete loss of her sciatic nerve function.  She is also concerned that the tumor may be progressing and extend proximally into her abdomen.  For this reason we should be sure that her next MRI study extends from her pelvis to her knee joint.    John Reyes MD  [facility] Family Professor  Oncology and Adult Reconstructive Surgery  Dept Orthopaedic Surgery, [facility]  [phone] office, [phone] pager  [email or web address]    Virtual-Visit Details    Janey Bo is a 71 year old female who is being evaluated via a billable video visit.      The patient has been notified of following:     \"This virtual video/telephone visit will be conducted via a call between you and your physician/provider. We have found that certain health care needs can be provided without the need for an in-person physical exam.  This service lets us provide the care you need with a video/telephone conversation.  If a prescription is necessary we can send it directly to your pharmacy.  If lab work is needed we can place an order for that and you can then stop by our lab to have the test done at a later time.    Video/telephone visits are billed at different rates depending on your insurance coverage.  Please reach out to your insurance provider with any questions.    If during the course of the call the physician/provider feels a virtual visit is not appropriate, you will not be charged for this service.\"    Patient has given verbal consent for Virtual visit? YES    Type of service:  Video/telephone Visit    Video/telephone time total duration including visit time, pre and post visit work time: 20 min    Originating Location (pt. Location): Home    Distant Location (provider location):  " [facility] ORTHOPEDIC [facility]    Mode of Communication:  Video Conference via [employer]

## 2021-08-02 NOTE — LETTER
8/2/2021         RE: Janey Bo  [address] [place] [address]        Dear Colleague,    Thank you for referring your patient, Janey Bo, to the [facility] ORTHOPED[facility] CLINIC [facility]. Please see a copy of my visit note below.        [facility] Physicians, Orthopaedic Oncology Surgery Consultation  by John Reyes M.D.    Janey Bo MRN# [id number]   Age: 68 year old YOB: 1950     Requesting physician: Dr. Jerod Redding, med oncology, [facility]  Dr. David Gaines, general surgery     Dx:    1. 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  3. 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  4. H/o thyroid CA, breast CA, pancreatic CA    Procedures:  1. 11/07/14, Left thigh mass marginal excision (Eric)  2. 1/20/2017, Excision L thigh atypical lipomatous tumor 7 cm, deep. (Eric) [facility]  3. Jan-March 2021, External beam RT 50 Gy L thigh (Dixie) [facility]  4. 4/16/2021, Intralesional excision, L thigh intraneural atypical lipomatous tumor (Eric) [facility]      I had a virtual visit with Viv today and reviewed the issue regarding the recurrent swelling of her left lower extremity.  An ultrasound examination was performed is negative for deep venous thrombosis.  I was able to review her extremity and indeed there is some increase in size being asymmetrical with the contralateral extremity.  She reports that her gait is normal with minimal limp.  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  It is difficult to appreciate whether or not the swelling is due to edema or other issues.  Clearly it is asymmetric.    7/30/2021 ultrasound neg dvt    Impression:  Differential diagnosis, radiation fibrosis with or without lymphedema as evidenced by chronic asymmetrical swelling of the left lower extremity after  "multiple operative procedures and radiation treatment for atypical lipomatous recurrent tumor.    Plan:  Referral to lymphedema therapist in her local vicinity.  I will ask our nurse Mali to arrange.  Working on range of motion, swelling and local control measures may be appropriate.    A baseline MRI examination should be obtained of her left thigh in 4 to 6 months.  I mentioned my concerns regarding the persistent tumor within the sciatic nerve itself.  Any further surgical resection likely will result in complete loss of her sciatic nerve function.  She is also concerned that the tumor may be progressing and extend proximally into her abdomen.  For this reason we should be sure that her next MRI study extends from her pelvis to her knee joint.    John Reyes MD  [facility] Family Professor  Oncology and Adult Reconstructive Surgery  Dept Orthopaedic Surgery, [facility]  [phone] office, [phone] pager  [email or web address]    Virtual-Visit Details    Janey Bo is a 71 year old female who is being evaluated via a billable video visit.      The patient has been notified of following:     \"This virtual video/telephone visit will be conducted via a call between you and your physician/provider. We have found that certain health care needs can be provided without the need for an in-person physical exam.  This service lets us provide the care you need with a video/telephone conversation.  If a prescription is necessary we can send it directly to your pharmacy.  If lab work is needed we can place an order for that and you can then stop by our lab to have the test done at a later time.    Video/telephone visits are billed at different rates depending on your insurance coverage.  Please reach out to your insurance provider with any questions.    If during the course of the call the physician/provider feels a virtual visit is not appropriate, you will not be charged for this service.\"    Patient has " given verbal consent for Virtual visit? YES    Type of service:  Video/telephone Visit    Video/telephone time total duration including visit time, pre and post visit work time: 20 min    Originating Location (pt. Location): Home    Distant Location (provider location):  [facility] ORTHOPEDIC [facility]    Mode of Communication:  Video Conference via [employer]

## 2021-08-29 ENCOUNTER — HEALTH MAINTENANCE LETTER (OUTPATIENT)
Age: 71
End: 2021-08-29

## 2021-09-15 ENCOUNTER — HOSPITAL ENCOUNTER (OUTPATIENT)
Dept: PHYSICAL THERAPY | Facility: REHABILITATION | Age: 71
End: 2021-09-15
Payer: MEDICARE

## 2021-09-15 DIAGNOSIS — I89.0 LYMPHEDEMA: Primary | ICD-10-CM

## 2021-09-15 PROCEDURE — 97161 PT EVAL LOW COMPLEX 20 MIN: CPT | Mod: GP | Performed by: PHYSICAL THERAPIST

## 2021-09-15 PROCEDURE — 97140 MANUAL THERAPY 1/> REGIONS: CPT | Mod: GP | Performed by: PHYSICAL THERAPIST

## 2021-09-15 NOTE — PROGRESS NOTES
Rehabilitation Services        OUTPATIENT PHYSICAL THERAPY EDEMA EVALUATION  PLAN OF TREATMENT FOR OUTPATIENT REHABILITATION  (COMPLETE FOR INITIAL CLAIMS ONLY)  Patient's Last Name, First Name, Janey Persaud                           Provider s Name:   Sandy Murillo PT Medical Record No.  [id number]     Start of Care Date:  09/15/21   Onset Date:  08/12/21   Type:  PT   Medical Diagnosis:  left lower extremity lymphedema   Therapy Diagnosis:  left LE lymphedema Visits from SOC:  1                                     __________________________________________________________________________________   Plan of Treatment/Functional Goals:    Manual lymph drainage, Gradient compression bandaging, Fit for compression garment, Exercises, Precautions to prevent infection / exacerbation, Education, Manual therapy, Skin care / precautions, Scar mobilization, Home management program development  recomended to get flexeis for abdominal an thigh compression     GOALS  1. Goal description: patient will have 5% LLE edema reduction to allow improve body image       Target date: 10/15/21  2. Goal description: patient will have increased skin mobility to decrease risk of infections       Target date: 10/15/21  3. Goal description: patient will be independent with disease management with precautions, skin care and management of compression garments       Target date: 10/15/21  4.            5.            6.               7.             8.              Treatment Frequency: 3x/week   Treatment duration: 4 weeks    Sandy Murillo PT                                    I CERTIFY THE NEED FOR THESE SERVICES FURNISHED UNDER        THIS PLAN OF TREATMENT AND WHILE UNDER MY CARE     (Physician co-signature of this document indicates review and certification of the therapy plan).                   Certification date  from: 09/15/21       Certification date to: 10/15/21           Referring physician: Jhon Reyes MD   Initial Assessment  See Epic Evaluation- Start of care: 09/15/21       09/15/21 1100   Quick Adds   Quick Adds Certification   Rehab Discipline   Discipline PT   Type of Visit   Type of visit Initial Edema Evaluation       present No   General Information   Start of care 09/15/21   Referring physician John Reyes MD   Orders Evaluate and treat as indicated   Order date 08/12/21   Medical diagnosis LEFT LOWER EXTREMITY LYMPHEDEMA LIPOSARCOMA S/PXRT   Onset of illness / date of surgery 08/12/21   Edema onset 08/12/21   Affected body parts LLE   Edema etiology Cancer with lymph node dissection;Radiation   Location - Radiation left posteriro thigh   Radiation comments 25   Surgical / medical history reviewed Yes   Prior level of functional mobility independent   Prior treatment Compression garments   Community support Family / friend caregiver   Patient role / employment history Retired   Psychosocial concerns Impaired body image   Living environment [facility]   Current assistive devices Forearm crutches   Fall Risk Screen   Fall screen completed by PT   Have you fallen 2 or more times in the past year? Yes   Have you fallen and had an injury in the past year? No   Is patient a fall risk? No   Fall screen comments reviewed falls precautions with good verbal return   Abuse Screen (yes response referral indicated)   Feels Unsafe at Home or Work/School no   Feels Threatened by Someone no   Does Anyone Try to Keep You From Having Contact with Others or Doing Things Outside Your Home? no   Physical Signs of Abuse Present no   System Outcome Measures   Outcome Measures Lymphedema   Lymphedema Life Impact Scale (score range 0-72). A higher score indicates greater impairment. 51   Subjective Report   Patient report of symptoms my left thigh is big   Patient / Family Goals   Patient / family  goals statement get rid of lymphedema   Pain   Patient currently in pain Yes   Pain location left thigh   Pain rating current 3/10, best 2/10, worst 8/10   Cognitive Status   Orientation Orientation to person, place and time   Level of consciousness Alert   Follows commands and answers questions 100% of the time   Personal safety and judgement Intact   Memory Intact   Edema Exam / Assessment   Skin condition Pitting;Dryness   Scar Yes   Location left posteriro thigh   Mobility decreased   Capillary refill Symmetrical   Dorsal pedal pulse Symmetrical   Stemmer sign Negative   Ulceration No   Volume LE   Right LE (mL) 6075.49   Left LE (mL) 64098.02   Range of Motion   ROM No deficits were identified   Strength   Strength No deficits were identified   Posture   Posture Forward head position   Palpation   Palpation soft skin   Activities of Daily Living   Activities of Daily Living independent   Bed Mobility   Bed mobility independent   Gait / Locomotion   Gait / Locomotion independent   Sensory   Sensory perception Light touch   Light touch Intact   Coordination   Coordination Gross motor coordination appropriate   Muscle Tone   Muscle tone No deficits were identified   Planned Edema Interventions   Planned edema interventions Manual lymph drainage;Gradient compression bandaging;Fit for compression garment;Exercises;Precautions to prevent infection / exacerbation;Education;Manual therapy;Skin care / precautions;Scar mobilization;Home management program development   Planned edema intervention comments recomended to get flexeis for abdominal an thigh compression   Clinical Impression   Criteria for skilled therapeutic intervention met Yes   Therapy diagnosis left LE lymphedema   Influenced by the following impairments / conditions Stage 2   Clinical Presentation Stable/Uncomplicated   Clinical Decision Making (Complexity) Low complexity   Treatment Frequency 3x/week   Treatment duration 4 weeks   Patient / family  and/or staff in agreement with plan of care Yes   Risks and benefits of therapy have been explained Yes   Clinical impression comments patient with left lower extremity lymphedema, especially the thigh, decreased scar mobility on the postrior thigh, patient has history of surgery and radiation treatments, skin intact.   Goal 1   Goal identifier left lower extremity edema   Goal description patient will have 5% LLE edema reduction to allow improve body image   Target date 10/15/21   Goal 2   Goal identifier skin fibrosis   Goal description patient will have increased skin mobility to decrease risk of infections   Target date 10/15/21   Goal 3   Goal identifier self care   Goal description patient will be independent with disease management with precautions, skin care and management of compression garments   Target date 10/15/21   Total Evaluation Time   PT Eval, Low Complexity Minutes (20172) 30   Certification   Certification date from 09/15/21   Certification date to 10/15/21   Medical Diagnosis left lower extremity lymphedema   Certification I certify the need for these services furnished under this plan of treatment and while under my care.  (Physician co-signature of this document indicates review and certification of the therapy plan).

## 2021-09-17 ENCOUNTER — HOSPITAL ENCOUNTER (OUTPATIENT)
Dept: PHYSICAL THERAPY | Facility: REHABILITATION | Age: 71
End: 2021-09-17
Payer: MEDICARE

## 2021-09-17 DIAGNOSIS — I89.0 LYMPHEDEMA: Primary | ICD-10-CM

## 2021-09-17 PROCEDURE — 97140 MANUAL THERAPY 1/> REGIONS: CPT | Mod: GP | Performed by: PHYSICAL THERAPIST

## 2021-09-20 NOTE — PROGRESS NOTES
Rehabilitation Services        OUTPATIENT PHYSICAL THERAPY EDEMA EVALUATION  PLAN OF TREATMENT FOR OUTPATIENT REHABILITATION  (COMPLETE FOR INITIAL CLAIMS ONLY)  Patient's Last Name, First Name, Janey Persaud                           Provider s Name:   Sandy Murillo PT Medical Record No.  [id number]     Start of Care Date:  09/15/21   Onset Date:  08/12/21   Type:  PT   Medical Diagnosis:  left lower extremity lymphedema   Therapy Diagnosis:  left LE lymphedema Visits from SOC:  1                                     __________________________________________________________________________________   Plan of Treatment/Functional Goals:    Manual lymph drainage, Gradient compression bandaging, Fit for compression garment, Exercises, Precautions to prevent infection / exacerbation, Education, Manual therapy, Skin care / precautions, Scar mobilization, Home management program development  recomended to get flexeis for abdominal an thigh compression     GOALS  1. Goal description: patient will have 5% LLE edema reduction to allow improve body image       Target date: 10/15/21  2. Goal description: patient will have increased skin mobility to decrease risk of infections       Target date: 10/15/21  3. Goal description: patient will be independent with disease management with precautions, skin care and management of compression garments       Target date: 10/15/21  4.            5.            6.               7.             8.              Treatment Frequency: 3x/week   Treatment duration: 4 weeks    Sandy Murillo PT                                    I CERTIFY THE NEED FOR THESE SERVICES FURNISHED UNDER        THIS PLAN OF TREATMENT AND WHILE UNDER MY CARE     (Physician co-signature of this document indicates review and certification of the therapy plan).                   Certification date  from: 09/15/21       Certification date to: 10/15/21           Referring physician: John Reyes MD   Initial Assessment  See Epic Evaluation- Start of care: 09/15/21

## 2021-09-20 NOTE — ADDENDUM NOTE
Encounter addended by: Sandy Murillo, PT on: 9/20/2021 3:40 PM   Actions taken: Clinical Note Signed, Flowsheet accepted, Document created, Document edited

## 2021-09-21 ENCOUNTER — HOSPITAL ENCOUNTER (OUTPATIENT)
Dept: PHYSICAL THERAPY | Facility: REHABILITATION | Age: 71
End: 2021-09-21
Payer: MEDICARE

## 2021-09-21 DIAGNOSIS — I89.0 LYMPHEDEMA: Primary | ICD-10-CM

## 2021-09-21 PROCEDURE — 97140 MANUAL THERAPY 1/> REGIONS: CPT | Mod: GP | Performed by: PHYSICAL THERAPIST

## 2021-09-23 ENCOUNTER — HOSPITAL ENCOUNTER (OUTPATIENT)
Dept: PHYSICAL THERAPY | Facility: REHABILITATION | Age: 71
End: 2021-09-23
Payer: MEDICARE

## 2021-09-23 DIAGNOSIS — I89.0 LYMPHEDEMA: Primary | ICD-10-CM

## 2021-09-23 PROCEDURE — 97140 MANUAL THERAPY 1/> REGIONS: CPT | Mod: GP | Performed by: PHYSICAL THERAPIST

## 2021-09-28 ENCOUNTER — HOSPITAL ENCOUNTER (OUTPATIENT)
Dept: PHYSICAL THERAPY | Facility: REHABILITATION | Age: 71
End: 2021-09-28
Payer: MEDICARE

## 2021-09-28 DIAGNOSIS — I89.0 LYMPHEDEMA: Primary | ICD-10-CM

## 2021-09-28 PROCEDURE — 97140 MANUAL THERAPY 1/> REGIONS: CPT | Mod: GP | Performed by: PHYSICAL THERAPIST

## 2021-10-04 NOTE — PROGRESS NOTES
Rehabilitation Services        OUTPATIENT PHYSICAL THERAPY EDEMA EVALUATION  PLAN OF TREATMENT FOR OUTPATIENT REHABILITATION  (COMPLETE FOR INITIAL CLAIMS ONLY)  Patient's Last Name, First Name, Janey Persaud                           Provider s Name:   Sandy Murillo PT Medical Record No.  [id number]     Start of Care Date:  09/15/21   Onset Date:  08/12/21   Type:  PT   Medical Diagnosis:  left lower extremity lymphedema   Therapy Diagnosis:  left LE lymphedema Visits from SOC:  1                                     __________________________________________________________________________________   Plan of Treatment/Functional Goals:    Manual lymph drainage, Gradient compression bandaging, Fit for compression garment, Exercises, Precautions to prevent infection / exacerbation, Education, Manual therapy, Skin care / precautions, Scar mobilization, Home management program development  recomended to get flexeis for abdominal an thigh compression     GOALS  1. Goal description: patient will have 5% LLE edema reduction to allow improve body image       Target date: 10/15/21  2. Goal description: patient will have increased skin mobility to decrease risk of infections       Target date: 10/15/21  3. Goal description: patient will be independent with disease management with precautions, skin care and management of compression garments       Target date: 10/15/21  4.            5.            6.               7.             8.              Treatment Frequency: 3x/week   Treatment duration: 4 weeks    Sandy Murillo PT                                    I CERTIFY THE NEED FOR THESE SERVICES FURNISHED UNDER        THIS PLAN OF TREATMENT AND WHILE UNDER MY CARE     (Physician co-signature of this document indicates review and certification of the therapy plan).                   Certification date  from: 09/15/21       Certification date to: 10/15/21           Referring physician: John Reyes MD   Initial Assessment  See Epic Evaluation- Start of care: 09/15/21         09/15/21 1100   Quick Adds   Quick Adds Certification   Rehab Discipline   Discipline PT   Type of Visit   Type of visit Initial Edema Evaluation       present No   General Information   Start of care 09/15/21   Referring physician John Reyes MD   Orders Evaluate and treat as indicated   Order date 08/12/21   Medical diagnosis LEFT LOWER EXTREMITY LYMPHEDEMA LIPOSARCOMA S/PXRT   Onset of illness / date of surgery 08/12/21   Edema onset 08/12/21   Affected body parts LLE   Edema etiology Cancer with lymph node dissection;Radiation   Location - Radiation left posteriro thigh   Radiation comments 25   Surgical / medical history reviewed Yes   Prior level of functional mobility independent   Prior treatment Compression garments   Community support Family / friend caregiver   Patient role / employment history Retired   Psychosocial concerns Impaired body image   Living environment [facility]   Current assistive devices Forearm crutches   Fall Risk Screen   Fall screen completed by PT   Have you fallen 2 or more times in the past year? Yes   Have you fallen and had an injury in the past year? No   Is patient a fall risk? No   Fall screen comments reviewed falls precautions with good verbal return   Abuse Screen (yes response referral indicated)   Feels Unsafe at Home or Work/School no   Feels Threatened by Someone no   Does Anyone Try to Keep You From Having Contact with Others or Doing Things Outside Your Home? no   Physical Signs of Abuse Present no   System Outcome Measures   Outcome Measures Lymphedema   Lymphedema Life Impact Scale (score range 0-72). A higher score indicates greater impairment. 51   Subjective Report   Patient report of symptoms my left thigh is big   Patient / Family Goals   Patient / family  goals statement get rid of lymphedema   Pain   Patient currently in pain Yes   Pain location left thigh   Pain rating current 3/10, best 2/10, worst 8/10   Cognitive Status   Orientation Orientation to person, place and time   Level of consciousness Alert   Follows commands and answers questions 100% of the time   Personal safety and judgement Intact   Memory Intact   Edema Exam / Assessment   Skin condition Pitting;Dryness   Scar Yes   Location left posteriro thigh   Mobility decreased   Capillary refill Symmetrical   Dorsal pedal pulse Symmetrical   Stemmer sign Negative   Ulceration No   Volume LE   Right LE (mL) 6075.49   Left LE (mL) 25012.02   Range of Motion   ROM No deficits were identified   Strength   Strength No deficits were identified   Posture   Posture Forward head position   Palpation   Palpation soft skin   Activities of Daily Living   Activities of Daily Living independent   Bed Mobility   Bed mobility independent   Gait / Locomotion   Gait / Locomotion independent   Sensory   Sensory perception Light touch   Light touch Intact   Coordination   Coordination Gross motor coordination appropriate   Muscle Tone   Muscle tone No deficits were identified   Planned Edema Interventions   Planned edema interventions Manual lymph drainage;Gradient compression bandaging;Fit for compression garment;Exercises;Precautions to prevent infection / exacerbation;Education;Manual therapy;Skin care / precautions;Scar mobilization;Home management program development   Planned edema intervention comments recomended to get flexeis for abdominal an thigh compression   Clinical Impression   Criteria for skilled therapeutic intervention met Yes   Therapy diagnosis left LE lymphedema   Influenced by the following impairments / conditions Stage 2   Clinical Presentation Stable/Uncomplicated   Clinical Decision Making (Complexity) Low complexity   Treatment Frequency 3x/week   Treatment duration 4 weeks   Patient / family  and/or staff in agreement with plan of care Yes   Risks and benefits of therapy have been explained Yes   Clinical impression comments patient with left lower extremity lymphedema, especially the thigh, decreased scar mobility on the postrior thigh, patient has history of surgery and radiation treatments, skin intact.   Goal 1   Goal identifier left lower extremity edema   Goal description patient will have 5% LLE edema reduction to allow improve body image   Target date 10/15/21   Goal 2   Goal identifier skin fibrosis   Goal description patient will have increased skin mobility to decrease risk of infections   Target date 10/15/21   Goal 3   Goal identifier self care   Goal description patient will be independent with disease management with precautions, skin care and management of compression garments   Target date 10/15/21   Total Evaluation Time   PT Eval, Low Complexity Minutes (99149) 30   Certification   Certification date from 09/15/21   Certification date to 10/15/21   Medical Diagnosis left lower extremity lymphedema   Certification I certify the need for these services furnished under this plan of treatment and while under my care.  (Physician co-signature of this document indicates review and certification of the therapy plan).

## 2021-10-04 NOTE — ADDENDUM NOTE
Encounter addended by: Sandy Murillo, PT on: 10/4/2021 12:41 PM   Actions taken: Clinical Note Signed, Flowsheet accepted, Document created, Document edited

## 2021-10-05 ENCOUNTER — HOSPITAL ENCOUNTER (OUTPATIENT)
Dept: PHYSICAL THERAPY | Facility: REHABILITATION | Age: 71
End: 2021-10-05
Payer: MEDICARE

## 2021-10-05 DIAGNOSIS — I89.0 LYMPHEDEMA: Primary | ICD-10-CM

## 2021-10-05 PROCEDURE — 97140 MANUAL THERAPY 1/> REGIONS: CPT | Mod: GP | Performed by: PHYSICAL THERAPIST

## 2021-10-07 ENCOUNTER — HOSPITAL ENCOUNTER (OUTPATIENT)
Dept: PHYSICAL THERAPY | Facility: REHABILITATION | Age: 71
End: 2021-10-07
Payer: MEDICARE

## 2021-10-07 DIAGNOSIS — C49.22: Primary | ICD-10-CM

## 2021-10-07 DIAGNOSIS — I89.0 LYMPHEDEMA: Primary | ICD-10-CM

## 2021-10-07 PROCEDURE — 97140 MANUAL THERAPY 1/> REGIONS: CPT | Mod: GP | Performed by: PHYSICAL THERAPY ASSISTANT

## 2021-10-12 ENCOUNTER — HOSPITAL ENCOUNTER (OUTPATIENT)
Dept: PHYSICAL THERAPY | Facility: REHABILITATION | Age: 71
End: 2021-10-12
Payer: MEDICARE

## 2021-10-12 DIAGNOSIS — I89.0 LYMPHEDEMA: Primary | ICD-10-CM

## 2021-10-12 PROCEDURE — 97140 MANUAL THERAPY 1/> REGIONS: CPT | Mod: GP | Performed by: PHYSICAL THERAPY ASSISTANT

## 2021-10-14 ENCOUNTER — TELEPHONE (OUTPATIENT)
Dept: PHYSICAL THERAPY | Facility: REHABILITATION | Age: 71
End: 2021-10-14

## 2021-10-14 NOTE — TELEPHONE ENCOUNTER
I left a voice message for Cori regarding today's NS ( NS #1) She does not have any other appointments scheduled at this time. I instructed her to call this clinic to schedule appointments if she wishes to continue.   Tory Esteban PTA,CLT

## 2021-10-19 ENCOUNTER — HOSPITAL ENCOUNTER (OUTPATIENT)
Dept: PHYSICAL THERAPY | Facility: REHABILITATION | Age: 71
End: 2021-10-19
Payer: MEDICARE

## 2021-10-19 DIAGNOSIS — I89.0 LYMPHEDEMA: Primary | ICD-10-CM

## 2021-10-19 PROCEDURE — 97140 MANUAL THERAPY 1/> REGIONS: CPT | Mod: GP | Performed by: PHYSICAL THERAPY ASSISTANT

## 2021-10-24 ENCOUNTER — HEALTH MAINTENANCE LETTER (OUTPATIENT)
Age: 71
End: 2021-10-24

## 2021-10-27 ENCOUNTER — HOSPITAL ENCOUNTER (OUTPATIENT)
Dept: PHYSICAL THERAPY | Facility: REHABILITATION | Age: 71
End: 2021-10-27
Payer: MEDICARE

## 2021-10-27 DIAGNOSIS — I89.0 LYMPHEDEMA: Primary | ICD-10-CM

## 2021-10-27 PROCEDURE — 97140 MANUAL THERAPY 1/> REGIONS: CPT | Mod: GP | Performed by: PHYSICAL THERAPIST

## 2021-10-27 NOTE — PROGRESS NOTES
OUTPATIENT PHYSICAL THERAPY  PLAN OF TREATMENT FOR OUTPATIENT REHABILITATION AND PROGRESS NOTE           Patient's Last Name, First Name, Janey Persaud Date of Birth  1950   Provider's Name  [facility] Medical Record No.  [id number]    Onset Date  9/15/21 Start of Care Date  9/15/21   Type:     _X_PT   ___OT   ___SLP Medical Diagnosis  Lymphedema of L LE   PT Diagnosis  Lymphedema of the L LE Plan of Treatment  Frequency/Duration: 1-2 times per week x 8 weeks for an additional up to 8 sessions   Certification date from 10/27/2021 to 1/20/22     Goals:  Goal Identifier left lower extremity edema   Goal Description patient will have 5% LLE edema reduction to allow improve body image   Target Date 10/15/21   Date Met      Progress (detail required for progress note):       Goal Identifier skin fibrosis   Goal Description patient will have increased skin mobility to decrease risk of infections   Target Date 10/15/21   Date Met      Progress (detail required for progress note):       Goal Identifier self care   Goal Description patient will be independent with disease management with precautions, skin care and management of compression garments   Target Date 10/15/21   Date Met      Progress (detail required for progress note):       Beginning/End Dates of Progress Note Reporting Period:  9/15/21 to 10/27/2021    Assessment   Patient is making progress in edema overall in L LE, noted improvement in definition of the knee and other areas. she is tolerating compression well but having difficulty with finding long term compression garments ermias fit well Patien remains appropriate for skilled therapy as she still have some flucuation in edema between appointments and has not reached a plateau and she is not I in self management in self cares at this time.     Progress Toward Goals:   Progress this reporting period: see above     Client Self (Subjective) Report  for Progress Note Reporting Period: no compression since last night but doing well, feels the wrapping has been helpful. now has purchased 3 thao legging and none of them are right, she did have a full length sock she said that she was keeping. she feels like when she does not come to therapy she does swell some, she is able to have leg wrapped at home with help     Outcome Measures (Most Recent Score):             I CERTIFY THE NEED FOR THESE SERVICES FURNISHED UNDER        THIS PLAN OF TREATMENT AND WHILE UNDER MY CARE     (Physician co-signature of this document indicates review and certification of the therapy plan).                Referring Provider: Dr John Guidry, PT, DPT, CLT-MARCELLA

## 2021-11-08 ENCOUNTER — ANCILLARY PROCEDURE (OUTPATIENT)
Dept: MRI IMAGING | Facility: CLINIC | Age: 71
End: 2021-11-08
Attending: ORTHOPAEDIC SURGERY
Payer: MEDICARE

## 2021-11-08 ENCOUNTER — OFFICE VISIT (OUTPATIENT)
Dept: ORTHOPEDICS | Facility: CLINIC | Age: 71
End: 2021-11-08
Payer: MEDICARE

## 2021-11-08 VITALS — HEIGHT: 61 IN | BODY MASS INDEX: 33.72 KG/M2 | WEIGHT: 178.6 LBS

## 2021-11-08 DIAGNOSIS — C49.22: ICD-10-CM

## 2021-11-08 DIAGNOSIS — C49.22 ATYPICAL LIPOMATOUS TUMOR OF LEFT LOWER EXTREMITY (H): Primary | ICD-10-CM

## 2021-11-08 PROCEDURE — 73720 MRI LWR EXTREMITY W/O&W/DYE: CPT | Mod: LT | Performed by: RADIOLOGY

## 2021-11-08 PROCEDURE — A9585 GADOBUTROL INJECTION: HCPCS | Performed by: RADIOLOGY

## 2021-11-08 PROCEDURE — 99213 OFFICE O/P EST LOW 20 MIN: CPT | Performed by: ORTHOPAEDIC SURGERY

## 2021-11-08 RX ORDER — GADOBUTROL 604.72 MG/ML
10 INJECTION INTRAVENOUS ONCE
Status: COMPLETED | OUTPATIENT
Start: 2021-11-08 | End: 2021-11-08

## 2021-11-08 RX ORDER — FLUOROURACIL 50 MG/G
CREAM TOPICAL
COMMUNITY
Start: 2021-09-03 | End: 2024-03-14

## 2021-11-08 RX ORDER — HYDROXYZINE HYDROCHLORIDE 10 MG/1
10 TABLET, FILM COATED ORAL PRN
COMMUNITY
Start: 2021-11-01

## 2021-11-08 RX ADMIN — GADOBUTROL 8 ML: 604.72 INJECTION INTRAVENOUS at 12:06

## 2021-11-08 ASSESSMENT — MIFFLIN-ST. JEOR: SCORE: 1249.56

## 2021-11-08 NOTE — PROGRESS NOTES
[facility] Physicians, Orthopaedic Oncology Surgery Consultation  by John Reyes M.D.    Jnaey Bo MRN# [id number]   Age: 68 year old YOB: 1950     Requesting physician: Dr. Jerod Redding, med oncology, [facility]  Dr. David Gaines, general surgery     Dx:    1. 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  3. 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  4. H/o thyroid CA, breast CA, pancreatic CA    Procedures:  1. 11/07/14, Left thigh mass marginal excision (Eric)  2. 1/20/2017, Excision L thigh atypical lipomatous tumor 7 cm, deep. (Eric) [facility]  3. Jan-March 2021, External beam RT 50 Gy L thigh (Dixie) [facility]  4. 4/16/2021, Intralesional excision, L thigh intraneural atypical lipomatous tumor (Eric) [facility]    Viv return to see me today for 6-month checkup after undergoing the above-mentioned surgery whereby her tumor was excised from within her sciatic nerve.  The tumor had infiltrated and was entwined within the nerve fascicles, and given the atypical, low-grade nature of this tumor, the sciatic nerve was preserved at the expense of sustaining positive margins.    She still has occasional symptoms of discomfort consisting of mainly sensory symptoms of numbness and tingling.  Her motor function is markedly improved since her postoperative state but had preceding weakness with paresis of her foot dorsiflexors preoperatively.    On examination, her gait is normal.  She can dorsiflex her foot with 4/5 strength and plantar flex with 5/5 strength.  I find no palpable evidence of tumor recurrence.  Incision is healed.    Baseline MRI imaging after the recent surgery is performed and shows evidence of the prior surgical procedure with post resection artifact and denervation atrophy of the surrounding hamstring muscles.  No enlarging lipomatous masses identified.      Assessment:   I  reviewed the MRI findings with Viv and informed her that I see no evidence of tumor regrowth requiring surgical intervention.  Nonetheless, she is at high risk for tumor recurrence despite the aggressive surgical treatment and radiation.    Plan:  Return for follow-up at her 1 year anniversary with follow-up MRI examination extending from the iliac crest to knee joint.    She informed that Dr. Redding ([facility]) has arranged for second opinion consultation with my [facility] colleagues.  I have asked her to query the consultants about possible genetics consultation and whether there is any relationship with her known history of thyroid, breast, and pancreatic carcinoma.    MD Brianna Sommers Family Professor  Oncology and Adult Reconstructive Surgery  Dept Orthopaedic Surgery, [facility]  [phone] office, [phone] pager  [email or web address]    Total combined visit time and work time before and after clinic visit = 20 min

## 2021-11-08 NOTE — DISCHARGE INSTRUCTIONS
MRI Contrast Discharge Instructions    The IV contrast you received today will pass out of your body in your  urine. This will happen in the next 24 hours. You will not feel this process.  Your urine will not change color.    Drink at least 4 extra glasses of water or juice today (unless your doctor  has restricted your fluids). This reduces the stress on your kidneys.  You may take your regular medicines.    If you are on dialysis: It is best to have dialysis today.    If you have a reaction: Most reactions happen right away. If you have  any new symptoms after leaving the hospital (such as hives or swelling),  call your hospital at the correct number below. Or call your family doctor.  If you have breathing distress or wheezing, call 911.    Special instructions: ***    I have read and understand the above information.    Signature:______________________________________ Date:___________    Staff:__________________________________________ Date:___________     Time:__________    [facility] Departments:    ___[place]: [phone]  ___[facility]: [phone]  ___[place]: [phone] ___[facility]: [phone]  ___[facility]: [phone]  ___[facility]: [phone]  ___[phone]  ___[facility]: [phone]  ___[phone]

## 2021-11-08 NOTE — LETTER
11/8/2021         RE: Janey Bo  [address] [address]        Dear Colleague,    Thank you for referring your patient, Janey Bo, to the [facility] [place]. Please see a copy of my visit note below.        [facility] Physicians, Orthopaedic Oncology Surgery Consultation  by John Reyes M.D.    Janey Bo MRN# [id number]   Age: 68 year old YOB: 1950     Requesting physician: Dr. Jerod Redding, med oncology, [facility]  Dr. David Gaines, general surgery     Dx:    1. 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  3. 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  4. H/o thyroid CA, breast CA, pancreatic CA    Procedures:  1. 11/07/14, Left thigh mass marginal excision (Eric)  2. 1/20/2017, Excision L thigh atypical lipomatous tumor 7 cm, deep. (Eric) [facility]  3. Jan-March 2021, External beam RT 50 Gy L thigh (Dixie) [facility]  4. 4/16/2021, Intralesional excision, L thigh intraneural atypical lipomatous tumor (Eric) [facility]    Viv return to see me today for 6-month checkup after undergoing the above-mentioned surgery whereby her tumor was excised from within her sciatic nerve.  The tumor had infiltrated and was entwined within the nerve fascicles, and given the atypical, low-grade nature of this tumor, the sciatic nerve was preserved at the expense of sustaining positive margins.    She still has occasional symptoms of discomfort consisting of mainly sensory symptoms of numbness and tingling.  Her motor function is markedly improved since her postoperative state but had preceding weakness with paresis of her foot dorsiflexors preoperatively.    On examination, her gait is normal.  She can dorsiflex her foot with 4/5 strength and plantar flex with 5/5 strength.  I find no palpable evidence of tumor recurrence.   Incision is healed.    Baseline MRI imaging after the recent surgery is performed and shows evidence of the prior surgical procedure with post resection artifact and denervation atrophy of the surrounding hamstring muscles.  No enlarging lipomatous masses identified.      Assessment:   I reviewed the MRI findings with Viv and informed her that I see no evidence of tumor regrowth requiring surgical intervention.  Nonetheless, she is at high risk for tumor recurrence despite the aggressive surgical treatment and radiation.    Plan:  Return for follow-up at her 1 year anniversary with follow-up MRI examination extending from the iliac crest to knee joint.    She informed that Dr. Redding ([facility]) has arranged for second opinion consultation with my [facility] colleagues.  I have asked her to query the consultants about possible genetics consultation and whether there is any relationship with her known history of thyroid, breast, and pancreatic carcinoma.    MD Brianna Sommers Family Professor  Oncology and Adult Reconstructive Surgery  Dept Orthopaedic Surgery, [facility]  [phone] office, [phone] pager  [email or web address]    Total combined visit time and work time before and after clinic visit = 20 min

## 2021-11-09 ENCOUNTER — HOSPITAL ENCOUNTER (OUTPATIENT)
Dept: PHYSICAL THERAPY | Facility: REHABILITATION | Age: 71
End: 2021-11-09
Payer: MEDICARE

## 2021-11-09 DIAGNOSIS — I89.0 LYMPHEDEMA: Primary | ICD-10-CM

## 2021-11-09 PROCEDURE — 97140 MANUAL THERAPY 1/> REGIONS: CPT | Mod: GP | Performed by: PHYSICAL THERAPIST

## 2021-11-30 ENCOUNTER — HOSPITAL ENCOUNTER (OUTPATIENT)
Dept: PHYSICAL THERAPY | Facility: REHABILITATION | Age: 71
End: 2021-11-30
Payer: MEDICARE

## 2021-11-30 DIAGNOSIS — I89.0 LYMPHEDEMA: Primary | ICD-10-CM

## 2021-11-30 PROCEDURE — 97140 MANUAL THERAPY 1/> REGIONS: CPT | Mod: GP | Performed by: PHYSICAL THERAPIST

## 2021-12-03 ENCOUNTER — HOSPITAL ENCOUNTER (OUTPATIENT)
Dept: PHYSICAL THERAPY | Facility: REHABILITATION | Age: 71
End: 2021-12-03
Payer: MEDICARE

## 2021-12-03 DIAGNOSIS — I89.0 LYMPHEDEMA: Primary | ICD-10-CM

## 2021-12-03 PROCEDURE — 97140 MANUAL THERAPY 1/> REGIONS: CPT | Mod: GP | Performed by: PHYSICAL THERAPIST

## 2021-12-06 ENCOUNTER — HOSPITAL ENCOUNTER (OUTPATIENT)
Dept: PHYSICAL THERAPY | Facility: REHABILITATION | Age: 71
End: 2021-12-06
Payer: MEDICARE

## 2021-12-06 DIAGNOSIS — I89.0 LYMPHEDEMA: Primary | ICD-10-CM

## 2021-12-06 PROCEDURE — 97140 MANUAL THERAPY 1/> REGIONS: CPT | Mod: GP | Performed by: PHYSICAL THERAPIST

## 2021-12-09 ENCOUNTER — HOSPITAL ENCOUNTER (OUTPATIENT)
Dept: PHYSICAL THERAPY | Facility: REHABILITATION | Age: 71
End: 2021-12-09
Payer: MEDICARE

## 2021-12-09 DIAGNOSIS — I89.0 LYMPHEDEMA: Primary | ICD-10-CM

## 2021-12-09 PROCEDURE — 97140 MANUAL THERAPY 1/> REGIONS: CPT | Mod: GP | Performed by: PHYSICAL THERAPIST

## 2021-12-19 ENCOUNTER — HEALTH MAINTENANCE LETTER (OUTPATIENT)
Age: 71
End: 2021-12-19

## 2022-02-01 ENCOUNTER — TELEPHONE (OUTPATIENT)
Dept: ORTHOPEDICS | Facility: CLINIC | Age: 72
End: 2022-02-01
Payer: MEDICARE

## 2022-02-01 DIAGNOSIS — C49.22: Primary | ICD-10-CM

## 2022-02-07 ENCOUNTER — TELEPHONE (OUTPATIENT)
Dept: ORTHOPEDICS | Facility: CLINIC | Age: 72
End: 2022-02-07
Payer: MEDICARE

## 2022-02-07 NOTE — TELEPHONE ENCOUNTER
Returned call to Cori after requested call.  Tasha wanted to share she did go to [place] for a second opinion and they did not have anything else to recommend.  Tasha is very happy to continue her care with Dr. Reyes.    Mali Rondon, BSN, RN  RN Care Coordinator, Dr. Reyes  [facility] Orthopedic [facility]

## 2022-02-13 ENCOUNTER — HEALTH MAINTENANCE LETTER (OUTPATIENT)
Age: 72
End: 2022-02-13

## 2022-04-10 ENCOUNTER — HEALTH MAINTENANCE LETTER (OUTPATIENT)
Age: 72
End: 2022-04-10

## 2022-04-11 ENCOUNTER — OFFICE VISIT (OUTPATIENT)
Dept: ORTHOPEDICS | Facility: CLINIC | Age: 72
End: 2022-04-11
Payer: MEDICARE

## 2022-04-11 ENCOUNTER — ANCILLARY PROCEDURE (OUTPATIENT)
Dept: MRI IMAGING | Facility: CLINIC | Age: 72
End: 2022-04-11
Attending: ORTHOPAEDIC SURGERY
Payer: MEDICARE

## 2022-04-11 VITALS — WEIGHT: 180 LBS | BODY MASS INDEX: 35.34 KG/M2 | HEIGHT: 60 IN

## 2022-04-11 DIAGNOSIS — C49.22: ICD-10-CM

## 2022-04-11 DIAGNOSIS — C49.22: Primary | ICD-10-CM

## 2022-04-11 PROCEDURE — 73720 MRI LWR EXTREMITY W/O&W/DYE: CPT | Mod: LT | Performed by: RADIOLOGY

## 2022-04-11 PROCEDURE — 99213 OFFICE O/P EST LOW 20 MIN: CPT | Performed by: ORTHOPAEDIC SURGERY

## 2022-04-11 PROCEDURE — A9585 GADOBUTROL INJECTION: HCPCS | Performed by: RADIOLOGY

## 2022-04-11 RX ORDER — GADOBUTROL 604.72 MG/ML
10 INJECTION INTRAVENOUS ONCE
Status: COMPLETED | OUTPATIENT
Start: 2022-04-11 | End: 2022-04-11

## 2022-04-11 RX ADMIN — GADOBUTROL 8.5 ML: 604.72 INJECTION INTRAVENOUS at 11:40

## 2022-04-11 NOTE — LETTER
4/11/2022         RE: Janey Bo  [address] [address]        Dear Colleague,    Thank you for referring your patient, Janey Bo, to the [facility]THOP[facility] [place]. Please see a copy of my visit note below.        [facility] Physicians, Orthopaedic Oncology Surgery Consultation  by John Reyes M.D.    Janey Bo MRN# [id number]   Age: 68 year old YOB: 1950     Requesting physician: Dr. Jerod Redding, med oncology, [facility]  Dr. David Gaines, general surgery     Dx:    1. 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  3. 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  4. H/o thyroid CA, breast CA, pancreatic CA    Procedures:  1. 11/07/14, Left thigh mass marginal excision (Eric)  2. 1/20/2017, Excision L thigh atypical lipomatous tumor 7 cm, deep. (Eric) [facility]  3. Jan-March 2021, External beam RT 50 Gy L thigh (Dixie) [facility]  4. 4/16/2021, Intralesional excision, L thigh intraneural atypical lipomatous tumor (Eric) [facility]      Viv return to see me today for 1 yr checkup after undergoing the above-mentioned surgery whereby her tumor was excised from within her sciatic nerve.  The tumor had infiltrated and was entwined within the nerve fascicles, and given the atypical, low-grade nature of this tumor, the sciatic nerve was preserved at the expense of sustaining positive margins.    Symptoms of discomfort consisting of mainly sensory symptoms of numbness and tingling have been improving..  Her motor function is markedly improved since her postoperative state but had preceding weakness with paresis of her foot dorsiflexors preoperatively.     She recently had a second opinion at the [facility] seeing Dr. ELVER Ortega who concurred with the need for continued surveillance.  She was inquiring about experimental  therapies and there are none.    On examination, her gait is normal.  She can dorsiflex her foot with 5/5 strength and plantar flex with 5/5 strength.     Current MRI examination is compared with the baseline postop imaging study and there is no evidence of progressive tumor growth in the prior surgical fields.  There is evidence of post resection artifact and denervation atrophy of the surrounding hamstring muscles.  No enlarging lipomatous masses identified.      Assessment:   I reviewed the MRI findings with Viv and informed her that I see no evidence of tumor regrowth requiring surgical intervention.  Nonetheless, she is at high risk for tumor recurrence despite the aggressive surgical treatment and radiation.    Plan:  Return for follow-up in 6 months with repeat follow-up MRI examination extending from the iliac crest to knee joint.  She has been offered genetics consultation given her multiple malignancies and she has declined this consultation.      John Reyes MD  [place] Family Professor  Oncology and Adult Reconstructive Surgery  Dept Orthopaedic Surgery, [facility]  [phone] office, [phone] pager  [email or web address]    Total combined visit time and work time before and after clinic visit = 20 min

## 2022-04-11 NOTE — NURSING NOTE
Chief Complaint   Patient presents with     RECHECK     Review imaging done same day  // 1 year follow up         71 year old  1950    Ht 1.524 m (5')   Wt 81.6 kg (180 lb)   BMI 35.15 kg/m        Date/Surgery/Surgeon/Hospital:  Past Surgical History:   Procedure Laterality Date     APPENDECTOMY       CHOLECYSTECTOMY       COLONOSCOPY       EXCISE MASS LOWER EXTREMITY Left 11/7/2014    Procedure: EXCISE MASS LOWER EXTREMITY;  Surgeon: John Reyes MD;  [facility]     EXCISE VILLALOBOS'S NEUROMA FOOT       EXCISE SOFT TISSUE TUMOR THIGH Left 1/20/2017    Procedure: EXCISE SOFT TISSUE TUMOR THIGH;  Surgeon: John Reyes MD;  [facility]     EXCISE SOFT TISSUE TUMOR THIGH Left 4/16/2021    Procedure: Excision of intraneural lipomatous tumor Left posterior thigh;  Surgeon: John Reyes MD;  [facility]     INJECT STEROID (LOCATION) Left 4/16/2021    Procedure: Left shoulder subacromial bursa steroid injection;  Surgeon: John Reyes MD;  [facility]     INNER EAR SURGERY       LUMPECTOMY BREAST       OOPHORECTOMY       partial hysterectomy       thyroidectomy       TONSILLECTOMY & ADENOIDECTOMY       WHIPPLE PROCEDURE       ZZ GASTROSCOPY,FL       ZZC HAND/FINGER SURGERY UNLISTED       Z STOMACH SURGERY PROCEDURE UNLISTED     1.       Pain Assessment  Patient Currently in Pain: Yes  0-10 Pain Scale:  (patient has pain with sitting, left buttock pain)       [place], [place] - [address]  Allergies   Allergen Reactions     Black Arrington Pollen Allergy Skin Test Anaphylaxis     Nuts Anaphylaxis     thickened throat     Pineapple Anaphylaxis and Other (See Comments)     Thickening in throat  thickened throat       Black Arrington Flavor Other (See Comments)     thickened throat  thickened throat     Liquid Adhesive Other (See Comments)     Tears my skin off     Morphine Itching and Other (See Comments)     Itchiness       Morphine Hcl Itching           Current  Outpatient Medications   Medication     albuterol (PROAIR HFA/PROVENTIL HFA/VENTOLIN HFA) 108 (90 Base) MCG/ACT inhaler     amylase-lipase-protease (CREON 24) 44780-72990 units CPEP per EC capsule     aspirin 81 MG tablet     biotin 5 MG CAPS     buPROPion (WELLBUTRIN SR) 200 MG 12 hr tablet     butalbital-aspirin-caffeine (FIORINAL) -40 MG capsule     calcium carbonate 750 MG CHEW     DilTIAZem HCl Coated Beads (DILTIAZEM CD PO)     escitalopram (LEXAPRO) 10 MG tablet     gabapentin (NEURONTIN) 300 MG capsule     hydrOXYzine (ATARAX) 10 MG tablet     ibuprofen (ADVIL/MOTRIN) 200 MG capsule     levothyroxine (SYNTHROID/LEVOTHROID) 150 MCG tablet     methylphenidate (RITALIN) 10 MG tablet     NAPROXEN SODIUM PO     potassium chloride ER (KLOR-CON M) 20 MEQ CR tablet     Probiotic Product (ALIGN) CAPS     raloxifene (EVISTA) 60 MG tablet     tiZANidine (ZANAFLEX) 2 MG tablet     Calcium-Magnesium-Vitamin D (CALCIUM MAGNESIUM PO)     cholecalciferol (VITAMIN D3) 10 mcg (400 units) TABS tablet     fluorouracil (EFUDEX) 5 % external cream     HYDROcodone-acetaminophen (NORCO) 5-325 MG tablet     metroNIDAZOLE (METROGEL) 0.75 % external gel     ondansetron (ZOFRAN-ODT) 4 MG ODT tab     senna-docusate (SENOKOT-S/PERICOLACE) 8.6-50 MG tablet     sucralfate (CARAFATE) 1 GM/10ML suspension     VITAMIN E BLEND PO     No current facility-administered medications for this visit.             Questionnaires:    HOOS Hip Dysfunction & Osteoarthritis Outcome Questionnaire    No flowsheet data found.           KOOS Knee Survey Assessment    No flowsheet data found.           Promis 10 Assessment    No flowsheet data found.           Ortho Oxford Knee Questionnaire    No flowsheet data found.

## 2022-04-11 NOTE — PROGRESS NOTES
[facility], Orthopaedic Oncology Surgery Consultation  by John Reyes M.D.    Janey Bo MRN# [id number]   Age: 68 year old YOB: 1950     Requesting physician: Dr. Jerod Redding, med oncology, [facility]  Dr. David Gaines, general surgery     Dx:    1. 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  3. 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  4. H/o thyroid CA, breast CA, pancreatic CA    Procedures:  1. 11/07/14, Left thigh mass marginal excision (Eric)  2. 1/20/2017, Excision L thigh atypical lipomatous tumor 7 cm, deep. (Eric) [facility]  3. Jan-March 2021, External beam RT 50 Gy L thigh (Dixie) [facility]  4. 4/16/2021, Intralesional excision, L thigh intraneural atypical lipomatous tumor (Eric) [facility]      Viv return to see me today for 1 yr checkup after undergoing the above-mentioned surgery whereby her tumor was excised from within her sciatic nerve.  The tumor had infiltrated and was entwined within the nerve fascicles, and given the atypical, low-grade nature of this tumor, the sciatic nerve was preserved at the expense of sustaining positive margins.    Symptoms of discomfort consisting of mainly sensory symptoms of numbness and tingling have been improving..  Her motor function is markedly improved since her postoperative state but had preceding weakness with paresis of her foot dorsiflexors preoperatively.     She recently had a second opinion at the [facility] seeing Dr. ELVER Ortega who concurred with the need for continued surveillance.  She was inquiring about experimental therapies and there are none.    On examination, her gait is normal.  She can dorsiflex her foot with 5/5 strength and plantar flex with 5/5 strength.     Current MRI examination is compared with the baseline postop imaging study and there is no evidence of progressive tumor growth  in the prior surgical fields.  There is evidence of post resection artifact and denervation atrophy of the surrounding hamstring muscles.  No enlarging lipomatous masses identified.      Assessment:   I reviewed the MRI findings with Viv and informed her that I see no evidence of tumor regrowth requiring surgical intervention.  Nonetheless, she is at high risk for tumor recurrence despite the aggressive surgical treatment and radiation.    Plan:  Return for follow-up in 6 months with repeat follow-up MRI examination extending from the iliac crest to knee joint.  She has been offered genetics consultation given her multiple malignancies and she has declined this consultation.      John Reyes MD  [facility] Family Professor  Oncology and Adult Reconstructive Surgery  Dept Orthopaedic Surgery, [facility]  [phone] office, [phone] pager  [email or web address]    Total combined visit time and work time before and after clinic visit = 20 min

## 2022-04-11 NOTE — DISCHARGE INSTRUCTIONS
MRI Contrast Discharge Instructions    The IV contrast you received today will pass out of your body in your  urine. This will happen in the next 24 hours. You will not feel this process.  Your urine will not change color.    Drink at least 4 extra glasses of water or juice today (unless your doctor  has restricted your fluids). This reduces the stress on your kidneys.  You may take your regular medicines.    If you are on dialysis: It is best to have dialysis today.    If you have a reaction: Most reactions happen right away. If you have  any new symptoms after leaving the hospital (such as hives or swelling),  call your hospital at the correct number below. Or call your family doctor.  If you have breathing distress or wheezing, call [phone].    Special instructions: ***    I have read and understand the above information.    Signature:______________________________________ Date:___________    Staff:__________________________________________ Date:___________     Time:__________    [place] [facility] Departments:    ___[place]: [phone]  ___[facility]: [phone]  ___[place]: [phone] ___[facility]: [phone]  ___[facility]: [phone]  ___[facility]: [phone]  ___[phone]  ___[facility]: [phone]  ___[phone]

## 2022-07-31 ENCOUNTER — HEALTH MAINTENANCE LETTER (OUTPATIENT)
Age: 72
End: 2022-07-31

## 2022-08-24 ENCOUNTER — APPOINTMENT (OUTPATIENT)
Dept: URBAN - METROPOLITAN AREA CLINIC 256 | Age: 72
Setting detail: DERMATOLOGY
End: 2022-08-24

## 2022-08-24 VITALS — HEIGHT: 61 IN | WEIGHT: 170 LBS

## 2022-08-24 DIAGNOSIS — L57.8 OTHER SKIN CHANGES DUE TO CHRONIC EXPOSURE TO NONIONIZING RADIATION: ICD-10-CM

## 2022-08-24 DIAGNOSIS — L82.0 INFLAMED SEBORRHEIC KERATOSIS: ICD-10-CM

## 2022-08-24 DIAGNOSIS — Z86.006 PERSONAL HISTORY OF MELANOMA IN-SITU: ICD-10-CM

## 2022-08-24 PROCEDURE — 17110 DESTRUCT B9 LESION 1-14: CPT

## 2022-08-24 PROCEDURE — OTHER LIQUID NITROGEN: OTHER

## 2022-08-24 PROCEDURE — OTHER MIPS QUALITY: OTHER

## 2022-08-24 PROCEDURE — OTHER COUNSELING: OTHER

## 2022-08-24 PROCEDURE — 99213 OFFICE O/P EST LOW 20 MIN: CPT | Mod: 25

## 2022-08-24 ASSESSMENT — LOCATION ZONE DERM
LOCATION ZONE: TRUNK
LOCATION ZONE: ARM

## 2022-08-24 ASSESSMENT — LOCATION DETAILED DESCRIPTION DERM
LOCATION DETAILED: LEFT SUPERIOR UPPER BACK
LOCATION DETAILED: RIGHT ANTERIOR SHOULDER
LOCATION DETAILED: RIGHT VENTRAL PROXIMAL FOREARM
LOCATION DETAILED: LEFT VENTRAL PROXIMAL FOREARM
LOCATION DETAILED: RIGHT CLAVICULAR SKIN

## 2022-08-24 ASSESSMENT — LOCATION SIMPLE DESCRIPTION DERM
LOCATION SIMPLE: RIGHT FOREARM
LOCATION SIMPLE: LEFT FOREARM
LOCATION SIMPLE: LEFT UPPER BACK
LOCATION SIMPLE: RIGHT SHOULDER
LOCATION SIMPLE: RIGHT CLAVICULAR SKIN

## 2022-08-24 NOTE — PROCEDURE: LIQUID NITROGEN
Detail Level: Detailed
Add 52 Modifier (Optional): no
Show Aperture Variable?: Yes
Spray Paint Text: The liquid nitrogen was applied to the skin utilizing a spray paint frosting technique.
Medical Necessity Information: It is in your best interest to select a reason for this procedure from the list below. All of these items fulfill various CMS LCD requirements except the new and changing color options.
Post-Care Instructions: I reviewed with the patient in detail post-care instructions. Patient is to wear sunprotection, and avoid picking at any of the treated lesions. Pt may apply Vaseline to crusted or scabbing areas.
Medical Necessity Clause: This procedure was medically necessary because the lesions that were treated were:
Consent: The patient's consent was obtained including but not limited to risks of crusting, scabbing, blistering, scarring, darker or lighter pigmentary change, recurrence, incomplete removal and infection.

## 2022-08-24 NOTE — PROCEDURE: COUNSELING
Detail Level: Detailed
Patient Specific Counseling (Will Not Stick From Patient To Patient): Recommended wearing long sleeves.
Detail Level: Simple

## 2022-08-24 NOTE — HPI: OTHER
Condition:: Follow up examination since Melanoma surgery with Dr cross in May 2022. The patient feels that everything went good in regards to the surgery
Please Describe Your Condition:: No problems while healing.

## 2022-08-25 ENCOUNTER — MEDICAL CORRESPONDENCE (OUTPATIENT)
Dept: HEALTH INFORMATION MANAGEMENT | Facility: CLINIC | Age: 72
End: 2022-08-25

## 2022-08-26 DIAGNOSIS — R00.2 PALPITATIONS: Primary | ICD-10-CM

## 2022-08-26 DIAGNOSIS — R51.9 ACUTE INTRACTABLE HEADACHE, UNSPECIFIED HEADACHE TYPE: ICD-10-CM

## 2022-10-06 ENCOUNTER — ANCILLARY PROCEDURE (OUTPATIENT)
Dept: MRI IMAGING | Facility: CLINIC | Age: 72
End: 2022-10-06
Attending: ORTHOPAEDIC SURGERY
Payer: MEDICARE

## 2022-10-06 ENCOUNTER — OFFICE VISIT (OUTPATIENT)
Dept: ORTHOPEDICS | Facility: CLINIC | Age: 72
End: 2022-10-06
Payer: MEDICARE

## 2022-10-06 DIAGNOSIS — C49.22 LIPOSARCOMA OF LEFT THIGH (H): Primary | ICD-10-CM

## 2022-10-06 DIAGNOSIS — C49.22: ICD-10-CM

## 2022-10-06 PROCEDURE — A9585 GADOBUTROL INJECTION: HCPCS | Performed by: RADIOLOGY

## 2022-10-06 PROCEDURE — 73720 MRI LWR EXTREMITY W/O&W/DYE: CPT | Mod: LT | Performed by: RADIOLOGY

## 2022-10-06 PROCEDURE — 99213 OFFICE O/P EST LOW 20 MIN: CPT | Performed by: ORTHOPAEDIC SURGERY

## 2022-10-06 PROCEDURE — G1004 CDSM NDSC: HCPCS | Performed by: RADIOLOGY

## 2022-10-06 RX ORDER — GADOBUTROL 604.72 MG/ML
10 INJECTION INTRAVENOUS ONCE
Status: COMPLETED | OUTPATIENT
Start: 2022-10-06 | End: 2022-10-06

## 2022-10-06 RX ADMIN — GADOBUTROL 8 ML: 604.72 INJECTION INTRAVENOUS at 12:50

## 2022-10-06 NOTE — DISCHARGE INSTRUCTIONS
MRI Contrast Discharge Instructions    The IV contrast you received today will pass out of your body in your  urine. This will happen in the next 24 hours. You will not feel this process.  Your urine will not change color.    Drink at least 4 extra glasses of water or juice today (unless your doctor  has restricted your fluids). This reduces the stress on your kidneys.  You may take your regular medicines.    If you are on dialysis: It is best to have dialysis today.    If you have a reaction: Most reactions happen right away. If you have  any new symptoms after leaving the hospital (such as hives or swelling),  call your hospital at the correct number below. Or call your family doctor.  If you have breathing distress or wheezing, call 911.    Special instructions: ***    I have read and understand the above information.    Signature:______________________________________ Date:___________    Staff:__________________________________________ Date:___________     Time:__________    [place] [facility] Departments:    ___[place]: [phone]  ___[facility]: [phone]  ___[place]: [phone] ___[facility]: [phone]  ___[facility]: [phone]  ___[facility]: [phone]  ___[phone]  ___[facility]: [phone]  ___[phone]

## 2022-10-06 NOTE — LETTER
10/6/2022         RE: Janey Bo  [address] [address]        Dear Colleague,    Thank you for referring your patient, Janey Bo, to the [facility] [place]. Please see a copy of my visit note below.        [facility], Orthopaedic Oncology Surgery Consultation  by John Reyes M.D.    Janey Bo MRN# [id number]   Age: 68 year old YOB: 1950     Requesting physician: Dr. Jerod Redding, med oncology, [facility]  Dr. David Gaines, general surgery     Dx:    1. 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  3. 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  4. H/o thyroid CA, breast CA, pancreatic CA    Procedures:  1. 11/07/14, Left thigh mass marginal excision (Eric)  2. 1/20/2017, Excision L thigh atypical lipomatous tumor 7 cm, deep. (Eric) [facility]  3. Jan-March 2021, External beam RT 50 Gy L thigh (Dixie) [facility]  4. 4/16/2021, Intralesional excision, L thigh intraneural atypical lipomatous tumor (Eric) [facility]      Viv return to see me today for 1.5 yr checkup after undergoing the above-mentioned surgery whereby her tumor was excised from within her sciatic nerve.  The tumor had infiltrated and was entwined within the nerve fascicles, and given the atypical, low-grade nature of this tumor, the sciatic nerve was preserved at the expense of sustaining positive margins.    Symptoms of discomfort consisting of mainly sensory symptoms of numbness and tingling have improved markedly she only has some residual symptoms along the lateral leg.      Examination demonstrates her incision is healed.  She has full active extension of the knee and further flexion to 125 degrees.  She has excellent motion of her ankle as well with 5/5 strength in both plantarflexion and dorsiflexion.  She is ambulating normally and  independently.      She recently had a second opinion at the [facility] seeing Dr. ELVER Ortega who concurred with the need for continued surveillance.  She was inquiring about experimental therapies and there are none.    Current MRI examination is compared with the baseline postop imaging study and there is no evidence of progressive tumor growth in the prior surgical fields.  There is evidence of post resection artifact and denervation atrophy of the surrounding hamstring muscles.  No enlarging lipomatous masses identified.      Assessment:   I reviewed the MRI findings with Viv and informed her that I see no evidence of tumor regrowth requiring surgical intervention.  Nonetheless, she is at high risk for tumor recurrence despite the aggressive surgical treatment and radiation.    Plan:  Return for follow-up in 6 months with repeat follow-up MRI examination extending from the iliac crest to knee joint.  If the next MRI study is unremarkable, we will extend her routine surveillance to yearly intervals thereafter.      MD Brianna Sommers Family Professor  Oncology and Adult Reconstructive Surgery  Dept Orthopaedic Surgery, [facility]  [phone] office, [phone] pager  [email or web address]    Total combined visit time and work time before and after clinic visit = 20 min

## 2022-10-06 NOTE — PROGRESS NOTES
[facility], Orthopaedic Oncology Surgery Consultation  by John Reyes M.D.    Janey Bo MRN# [id number]   Age: 68 year old YOB: 1950     Requesting physician: Dr. Jerod Redding, med oncology, [facility]  Dr. David Gaines, general surgery     Dx:    1. 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  3. 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  4. H/o thyroid CA, breast CA, pancreatic CA    Procedures:  1. 11/07/14, Left thigh mass marginal excision (Eric)  2. 1/20/2017, Excision L thigh atypical lipomatous tumor 7 cm, deep. (Eric) [facility]  3. Jan-March 2021, External beam RT 50 Gy L thigh (Dixie) [facility]  4. 4/16/2021, Intralesional excision, L thigh intraneural atypical lipomatous tumor (Eric) [facility]      Viv return to see me today for 1.5 yr checkup after undergoing the above-mentioned surgery whereby her tumor was excised from within her sciatic nerve.  The tumor had infiltrated and was entwined within the nerve fascicles, and given the atypical, low-grade nature of this tumor, the sciatic nerve was preserved at the expense of sustaining positive margins.    Symptoms of discomfort consisting of mainly sensory symptoms of numbness and tingling have improved markedly she only has some residual symptoms along the lateral leg.      Examination demonstrates her incision is healed.  She has full active extension of the knee and further flexion to 125 degrees.  She has excellent motion of her ankle as well with 5/5 strength in both plantarflexion and dorsiflexion.  She is ambulating normally and independently.      She recently had a second opinion at the [facility] seeing Dr. ELVER Ortega who concurred with the need for continued surveillance.  She was inquiring about experimental therapies and there are none.    Current MRI examination is compared with the baseline  postop imaging study and there is no evidence of progressive tumor growth in the prior surgical fields.  There is evidence of post resection artifact and denervation atrophy of the surrounding hamstring muscles.  No enlarging lipomatous masses identified.      Assessment:   I reviewed the MRI findings with Viv and informed her that I see no evidence of tumor regrowth requiring surgical intervention.  Nonetheless, she is at high risk for tumor recurrence despite the aggressive surgical treatment and radiation.    Plan:  Return for follow-up in 6 months with repeat follow-up MRI examination extending from the iliac crest to knee joint.  If the next MRI study is unremarkable, we will extend her routine surveillance to yearly intervals thereafter.      MD Brianna Sommers Family Professor  Oncology and Adult Reconstructive Surgery  Dept Orthopaedic Surgery, [facility]  [phone] office, [phone] pager  [email or web address]    Total combined visit time and work time before and after clinic visit = 20 min

## 2022-10-15 ENCOUNTER — HEALTH MAINTENANCE LETTER (OUTPATIENT)
Age: 72
End: 2022-10-15

## 2022-11-27 ENCOUNTER — HEALTH MAINTENANCE LETTER (OUTPATIENT)
Age: 72
End: 2022-11-27

## 2022-12-07 ENCOUNTER — APPOINTMENT (OUTPATIENT)
Dept: URBAN - METROPOLITAN AREA CLINIC 256 | Age: 72
Setting detail: DERMATOLOGY
End: 2022-12-07

## 2022-12-07 VITALS — WEIGHT: 170 LBS | HEIGHT: 60.5 IN

## 2022-12-07 DIAGNOSIS — B37.2 CANDIDIASIS OF SKIN AND NAIL: ICD-10-CM

## 2022-12-07 DIAGNOSIS — L82.0 INFLAMED SEBORRHEIC KERATOSIS: ICD-10-CM

## 2022-12-07 DIAGNOSIS — L57.0 ACTINIC KERATOSIS: ICD-10-CM

## 2022-12-07 PROCEDURE — OTHER COUNSELING: OTHER

## 2022-12-07 PROCEDURE — OTHER MIPS QUALITY: OTHER

## 2022-12-07 PROCEDURE — OTHER PRESCRIPTION: OTHER

## 2022-12-07 PROCEDURE — 99213 OFFICE O/P EST LOW 20 MIN: CPT

## 2022-12-07 PROCEDURE — OTHER MEDICATION COUNSELING: OTHER

## 2022-12-07 RX ORDER — KETOCONAZOLE 20 MG/G
2% CREAM TOPICAL BID
Qty: 60 | Refills: 1 | Status: ERX | COMMUNITY
Start: 2022-12-07

## 2022-12-07 ASSESSMENT — LOCATION SIMPLE DESCRIPTION DERM
LOCATION SIMPLE: CHEST
LOCATION SIMPLE: ABDOMEN
LOCATION SIMPLE: LEFT CHEEK

## 2022-12-07 ASSESSMENT — LOCATION DETAILED DESCRIPTION DERM
LOCATION DETAILED: RIGHT MEDIAL SUPERIOR CHEST
LOCATION DETAILED: PERIUMBILICAL SKIN
LOCATION DETAILED: RIGHT LATERAL ABDOMEN
LOCATION DETAILED: LEFT CENTRAL MALAR CHEEK
LOCATION DETAILED: LEFT LATERAL ABDOMEN

## 2022-12-07 ASSESSMENT — LOCATION ZONE DERM
LOCATION ZONE: TRUNK
LOCATION ZONE: FACE

## 2022-12-07 NOTE — PROCEDURE: COUNSELING
Detail Level: Detailed
Patient Specific Counseling (Will Not Stick From Patient To Patient): Counseled the patient on how to use the 5-fluorouracil  treatment for AKs\\nPatient requested that we apply her first application of 5-fluorouracil just to teach her how to apply it at home. She did bring in her own prescribed 5-fluorouracil.
Detail Level: Zone

## 2022-12-07 NOTE — PROCEDURE: MIPS QUALITY
Quality 130: Documentation Of Current Medications In The Medical Record: Current Medications Documented
Quality 431: Preventive Care And Screening: Unhealthy Alcohol Use - Screening: Patient not identified as an unhealthy alcohol user when screened for unhealthy alcohol use using a systematic screening method
Quality 226: Preventive Care And Screening: Tobacco Use: Screening And Cessation Intervention: Patient screened for tobacco use and is an ex/non-smoker
Quality 111:Pneumonia Vaccination Status For Older Adults: Pneumococcal vaccine administered on or after patient’s 60th birthday and before the end of the measurement period
Detail Level: Detailed

## 2022-12-07 NOTE — PROCEDURE: MEDICATION COUNSELING
Adbry Pregnancy And Lactation Text: It is unknown if this medication will adversely affect pregnancy or breast feeding. Sotyktu Counseling:  I discussed the most common side effects of Sotyktu including: common cold, sore throat, sinus infections, cold sores, canker sores, folliculitis, and acne.  I also discussed more serious side effects of Sotyktu including but not limited to: serious allergic reactions; increased risk for infections such as TB; cancers such as lymphomas; rhabdomyolysis and elevated CPK; and elevated triglycerides and liver enzymes.

## 2022-12-23 ENCOUNTER — APPOINTMENT (OUTPATIENT)
Dept: URBAN - METROPOLITAN AREA CLINIC 258 | Age: 72
Setting detail: DERMATOLOGY
End: 2022-12-23

## 2022-12-23 VITALS — HEIGHT: 60 IN | WEIGHT: 170 LBS

## 2022-12-23 DIAGNOSIS — L57.0 ACTINIC KERATOSIS: ICD-10-CM

## 2022-12-23 PROCEDURE — OTHER COUNSELING: OTHER

## 2022-12-23 PROCEDURE — OTHER MIPS QUALITY: OTHER

## 2022-12-23 PROCEDURE — 99213 OFFICE O/P EST LOW 20 MIN: CPT | Mod: 95

## 2022-12-23 PROCEDURE — OTHER PRESCRIPTION MEDICATION MANAGEMENT: OTHER

## 2022-12-23 ASSESSMENT — LOCATION DETAILED DESCRIPTION DERM: LOCATION DETAILED: LEFT INFERIOR CENTRAL MALAR CHEEK

## 2022-12-23 ASSESSMENT — LOCATION SIMPLE DESCRIPTION DERM: LOCATION SIMPLE: LEFT CHEEK

## 2022-12-23 ASSESSMENT — LOCATION ZONE DERM: LOCATION ZONE: FACE

## 2022-12-23 NOTE — PROCEDURE: PRESCRIPTION MEDICATION MANAGEMENT
Detail Level: Zone
Render In Strict Bullet Format?: No
Plan: Pt was examined by telehealth visit. I explained that I am unable to accurately say if all AKs have resolved over virtual visit. I recommend d/c of 5-FU and F/U in 2 weeks in clinic for complete evaluation and discussion of management going forward.

## 2023-01-01 NOTE — PROCEDURE: MEDICATION COUNSELING
4 = completely dependent Metronidazole Pregnancy And Lactation Text: This medication is Pregnancy Category B and considered safe during pregnancy.  It is also excreted in breast milk.

## 2023-01-16 ENCOUNTER — APPOINTMENT (OUTPATIENT)
Dept: URBAN - METROPOLITAN AREA CLINIC 256 | Age: 73
Setting detail: DERMATOLOGY
End: 2023-01-16

## 2023-01-16 DIAGNOSIS — L60.3 NAIL DYSTROPHY: ICD-10-CM

## 2023-01-16 DIAGNOSIS — L82.0 INFLAMED SEBORRHEIC KERATOSIS: ICD-10-CM

## 2023-01-16 DIAGNOSIS — L24.4 IRRITANT CONTACT DERMATITIS DUE TO DRUGS IN CONTACT WITH SKIN: ICD-10-CM

## 2023-01-16 PROCEDURE — OTHER COUNSELING: OTHER

## 2023-01-16 PROCEDURE — OTHER LIQUID NITROGEN: OTHER

## 2023-01-16 PROCEDURE — 99213 OFFICE O/P EST LOW 20 MIN: CPT | Mod: 25

## 2023-01-16 PROCEDURE — 17110 DESTRUCT B9 LESION 1-14: CPT

## 2023-01-16 PROCEDURE — OTHER MIPS QUALITY: OTHER

## 2023-01-16 ASSESSMENT — LOCATION DETAILED DESCRIPTION DERM
LOCATION DETAILED: RIGHT MIDDLE FINGERNAIL
LOCATION DETAILED: LEFT SMALL FINGERNAIL
LOCATION DETAILED: RIGHT INDEX FINGERNAIL
LOCATION DETAILED: LEFT RING FINGERNAIL
LOCATION DETAILED: RIGHT CLAVICULAR SKIN

## 2023-01-16 ASSESSMENT — LOCATION SIMPLE DESCRIPTION DERM
LOCATION SIMPLE: RIGHT MIDDLE FINGERNAIL
LOCATION SIMPLE: LEFT RING FINGERNAIL
LOCATION SIMPLE: RIGHT CLAVICULAR SKIN
LOCATION SIMPLE: RIGHT INDEX FINGERNAIL
LOCATION SIMPLE: LEFT SMALL FINGERNAIL

## 2023-01-16 ASSESSMENT — LOCATION ZONE DERM
LOCATION ZONE: FINGERNAIL
LOCATION ZONE: TRUNK

## 2023-01-16 NOTE — PROCEDURE: LIQUID NITROGEN
Show Applicator Variable?: Yes
Duration Of Freeze Thaw-Cycle (Seconds): 5-10
Spray Paint Technique: No
Post-Care Instructions: I reviewed with the patient in detail post-care instructions. Patient is to wear sunprotection, and avoid picking at any of the treated lesions. Pt may apply Vaseline to crusted or scabbing areas.
Spray Paint Text: The liquid nitrogen was applied to the skin utilizing a spray paint frosting technique.
Medical Necessity Information: It is in your best interest to select a reason for this procedure from the list below. All of these items fulfill various CMS LCD requirements except the new and changing color options.
Medical Necessity Clause: This procedure was medically necessary because the lesions that were treated were:
Consent: The patient's consent was obtained including but not limited to risks of crusting, scabbing, blistering, scarring, darker or lighter pigmentary change, recurrence, incomplete removal and infection.
Number Of Freeze-Thaw Cycles: 1 freeze-thaw cycle
Application Tool (Optional): Liquid Nitrogen Sprayer
Detail Level: Detailed

## 2023-03-02 ENCOUNTER — APPOINTMENT (OUTPATIENT)
Dept: URBAN - METROPOLITAN AREA CLINIC 256 | Age: 73
Setting detail: DERMATOLOGY
End: 2023-03-03

## 2023-03-02 ENCOUNTER — APPOINTMENT (OUTPATIENT)
Dept: URBAN - METROPOLITAN AREA CLINIC 256 | Age: 73
Setting detail: DERMATOLOGY
End: 2023-03-02

## 2023-03-02 DIAGNOSIS — L82.0 INFLAMED SEBORRHEIC KERATOSIS: ICD-10-CM

## 2023-03-02 DIAGNOSIS — I78.8 OTHER DISEASES OF CAPILLARIES: ICD-10-CM

## 2023-03-02 DIAGNOSIS — L73.8 OTHER SPECIFIED FOLLICULAR DISORDERS: ICD-10-CM

## 2023-03-02 DIAGNOSIS — L72.0 EPIDERMAL CYST: ICD-10-CM

## 2023-03-02 DIAGNOSIS — L68.9 HYPERTRICHOSIS, UNSPECIFIED: ICD-10-CM

## 2023-03-02 DIAGNOSIS — L81.4 OTHER MELANIN HYPERPIGMENTATION: ICD-10-CM

## 2023-03-02 DIAGNOSIS — L82.1 OTHER SEBORRHEIC KERATOSIS: ICD-10-CM

## 2023-03-02 PROCEDURE — OTHER COUNSELING: OTHER

## 2023-03-02 PROCEDURE — OTHER MIPS QUALITY: OTHER

## 2023-03-02 PROCEDURE — OTHER LIQUID NITROGEN: OTHER

## 2023-03-02 PROCEDURE — OTHER ADDITIONAL NOTES: OTHER

## 2023-03-02 PROCEDURE — 17110 DESTRUCT B9 LESION 1-14: CPT

## 2023-03-02 PROCEDURE — OTHER DEFER: OTHER

## 2023-03-02 PROCEDURE — 99214 OFFICE O/P EST MOD 30 MIN: CPT | Mod: 25

## 2023-03-02 PROCEDURE — OTHER LIQUID NITROGEN (COSMETIC): OTHER

## 2023-03-02 ASSESSMENT — LOCATION DETAILED DESCRIPTION DERM
LOCATION DETAILED: INFERIOR MID FOREHEAD
LOCATION DETAILED: RIGHT MEDIAL MALAR CHEEK
LOCATION DETAILED: LEFT INFERIOR MEDIAL FOREHEAD
LOCATION DETAILED: RIGHT INFERIOR MEDIAL MALAR CHEEK
LOCATION DETAILED: RIGHT CHIN
LOCATION DETAILED: LEFT CENTRAL LATERAL NECK
LOCATION DETAILED: RIGHT LATERAL MALAR CHEEK
LOCATION DETAILED: PHILTRUM
LOCATION DETAILED: RIGHT MEDIAL FOREHEAD
LOCATION DETAILED: LEFT LOWER CUTANEOUS LIP
LOCATION DETAILED: LEFT INFERIOR MEDIAL MALAR CHEEK
LOCATION DETAILED: RIGHT LOWER CUTANEOUS LIP

## 2023-03-02 ASSESSMENT — LOCATION SIMPLE DESCRIPTION DERM
LOCATION SIMPLE: UPPER LIP
LOCATION SIMPLE: LEFT FOREHEAD
LOCATION SIMPLE: CHIN
LOCATION SIMPLE: RIGHT LIP
LOCATION SIMPLE: LEFT LIP
LOCATION SIMPLE: LEFT CHEEK
LOCATION SIMPLE: RIGHT CHEEK
LOCATION SIMPLE: RIGHT FOREHEAD
LOCATION SIMPLE: INFERIOR FOREHEAD
LOCATION SIMPLE: NECK

## 2023-03-02 ASSESSMENT — LOCATION ZONE DERM
LOCATION ZONE: NECK
LOCATION ZONE: FACE
LOCATION ZONE: LIP

## 2023-03-02 NOTE — PROCEDURE: LIQUID NITROGEN (COSMETIC)
Post-Care Instructions: I reviewed with the patient in detail post-care instructions. Patient is to wear sunprotection, and avoid picking at any of the treated lesions. Pt may apply Vaseline to crusted or scabbing areas.
Detail Level: Detailed
Billing Information: Bill by Static Price
Consent: The patient's consent was obtained including but not limited to risks of crusting, scabbing, blistering, scarring, darker or lighter pigmentary change, recurrence, incomplete removal and infection. The patient understands that the procedure is cosmetic in nature and is not covered by insurance.
Show Spray Paint Technique Variable?: Yes
Price (Use Numbers Only, No Special Characters Or $): 0
Render Post-Care Instructions In Note?: no
Spray Paint Text: The liquid nitrogen was applied to the skin utilizing a spray paint frosting technique.

## 2023-03-02 NOTE — PROCEDURE: ADDITIONAL NOTES
Additional Notes: No charge liquid nitrogen treatment
Render Risk Assessment In Note?: no
Detail Level: Simple

## 2023-03-02 NOTE — PROCEDURE: DEFER
X Size Of Lesion In Cm (Optional): 0
Introduction Text (Please End With A Colon): The following procedure was deferred:  laser treatment with Erin in the spa:
Procedure To Be Performed At Next Visit: Laser Hair Removal
Procedure To Be Performed At Next Visit: Laser: Pulse dye laser 595nm
Detail Level: Detailed
Other Procedure: extraction
Introduction Text (Please End With A Colon): The following procedure was deferred: laser treatment with Erin in the spa
Procedure To Be Performed At Next Visit: IPL
Introduction Text (Please End With A Colon): The following procedure was deferred:

## 2023-03-02 NOTE — PROCEDURE: LIQUID NITROGEN
Post-Care Instructions: I reviewed with the patient in detail post-care instructions. Patient is to wear sunprotection, and avoid picking at any of the treated lesions. Pt may apply Vaseline to crusted or scabbing areas.
Detail Level: Detailed
Spray Paint Technique: No
Spray Paint Text: The liquid nitrogen was applied to the skin utilizing a spray paint frosting technique.
Medical Necessity Information: It is in your best interest to select a reason for this procedure from the list below. All of these items fulfill various CMS LCD requirements except the new and changing color options.
Show Topical Anesthesia Variable?: Yes
Consent: The patient's consent was obtained including but not limited to risks of crusting, scabbing, blistering, scarring, darker or lighter pigmentary change, recurrence, incomplete removal and infection.
Medical Necessity Clause: This procedure was medically necessary because the lesions that were treated were:

## 2023-03-02 NOTE — HPI: OTHER
Condition:: numerous concerns located on the face that she would like to discuss today
Please Describe Your Condition:: She would like to discuss treatment options for her concerns on the face.

## 2023-03-02 NOTE — PROCEDURE: COUNSELING
Detail Level: Simple
Patient Specific Counseling (Will Not Stick From Patient To Patient): We agreed to treat her seborrheic keratoses at [place]
Detail Level: Detailed
Detail Level: Zone

## 2023-03-16 ENCOUNTER — APPOINTMENT (OUTPATIENT)
Dept: URBAN - METROPOLITAN AREA CLINIC 281 | Age: 73
Setting detail: DERMATOLOGY
End: 2023-03-16

## 2023-03-16 DIAGNOSIS — Z41.9 ENCOUNTER FOR PROCEDURE FOR PURPOSES OTHER THAN REMEDYING HEALTH STATE, UNSPECIFIED: ICD-10-CM

## 2023-03-16 PROCEDURE — OTHER COSMETIC CONSULTATION: GENERAL: OTHER

## 2023-03-26 ENCOUNTER — HEALTH MAINTENANCE LETTER (OUTPATIENT)
Age: 73
End: 2023-03-26

## 2023-04-03 ENCOUNTER — OFFICE VISIT (OUTPATIENT)
Dept: ORTHOPEDICS | Facility: CLINIC | Age: 73
End: 2023-04-03
Payer: MEDICARE

## 2023-04-03 ENCOUNTER — ANCILLARY PROCEDURE (OUTPATIENT)
Dept: MRI IMAGING | Facility: CLINIC | Age: 73
End: 2023-04-03
Attending: ORTHOPAEDIC SURGERY
Payer: MEDICARE

## 2023-04-03 DIAGNOSIS — C49.22 LIPOSARCOMA OF LEFT THIGH (H): ICD-10-CM

## 2023-04-03 DIAGNOSIS — C49.22 ATYPICAL LIPOMATOUS TUMOR OF LEFT LOWER EXTREMITY (H): Primary | ICD-10-CM

## 2023-04-03 PROCEDURE — G1010 CDSM STANSON: HCPCS | Mod: GC | Performed by: RADIOLOGY

## 2023-04-03 PROCEDURE — A9585 GADOBUTROL INJECTION: HCPCS | Performed by: RADIOLOGY

## 2023-04-03 PROCEDURE — 73720 MRI LWR EXTREMITY W/O&W/DYE: CPT | Mod: LT | Performed by: RADIOLOGY

## 2023-04-03 PROCEDURE — 99213 OFFICE O/P EST LOW 20 MIN: CPT | Performed by: ORTHOPAEDIC SURGERY

## 2023-04-03 RX ORDER — BUPROPION HYDROCHLORIDE 100 MG/1
200 TABLET ORAL EVERY MORNING
COMMUNITY
Start: 2023-01-19

## 2023-04-03 RX ORDER — LEVOTHYROXINE SODIUM 150 UG/1
150 TABLET ORAL
COMMUNITY
Start: 2021-09-25 | End: 2024-09-20

## 2023-04-03 RX ORDER — HYDROXYZINE HYDROCHLORIDE 10 MG/1
10 TABLET, FILM COATED ORAL
COMMUNITY
Start: 2021-11-01 | End: 2024-09-20

## 2023-04-03 RX ORDER — GABAPENTIN 300 MG/1
1 CAPSULE ORAL AT BEDTIME
COMMUNITY
Start: 2021-05-03 | End: 2024-02-26

## 2023-04-03 RX ORDER — DILTIAZEM HYDROCHLORIDE 180 MG/1
180 CAPSULE, EXTENDED RELEASE ORAL DAILY
COMMUNITY
Start: 2022-10-07 | End: 2024-09-20

## 2023-04-03 RX ORDER — METHYLPHENIDATE HYDROCHLORIDE 20 MG/1
TABLET ORAL
COMMUNITY
Start: 2023-03-17 | End: 2024-09-20

## 2023-04-03 RX ORDER — GADOBUTROL 604.72 MG/ML
10 INJECTION INTRAVENOUS ONCE
Status: COMPLETED | OUTPATIENT
Start: 2023-04-03 | End: 2023-04-03

## 2023-04-03 RX ADMIN — GADOBUTROL 8 ML: 604.72 INJECTION INTRAVENOUS at 14:07

## 2023-04-03 NOTE — PROGRESS NOTES
[facility], Orthopaedic Oncology Surgery Consultation  by John Reyes M.D.    Janey Bo MRN# [id number]   Age: 68 year old YOB: 1950     Requesting physician: Dr. Jerod Redding, med oncology, [facility]  Dr. David Gaines, general surgery     Dx:    1. 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  3. 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  4. H/o thyroid CA, breast CA, pancreatic CA    Procedures:  1. 11/07/14, Left thigh mass marginal excision (Eric)  2. 1/20/2017, Excision L thigh atypical lipomatous tumor 7 cm, deep. (Eric) [facility]  3. Jan-March 2021, External beam RT 50 Gy L thigh (Dixie) [facility]  4. 4/16/2021, Intralesional excision, L thigh intraneural atypical lipomatous tumor (Eric) [facility]      Viv returns to see me today for 2 yr checkup after undergoing the above-mentioned surgery whereby her tumor was excised from within her sciatic nerve.  The tumor had infiltrated and was entwined within the nerve fascicles, and given the atypical, low-grade nature of this tumor, the sciatic nerve was preserved at the expense of sustaining positive margins.    Symptoms of discomfort consisting of mainly sensory symptoms of numbness and tingling have improved markedly she only has some residual symptoms along the lateral leg.      MRI examination reveals no evidence of recurrent tumor formation or questionable areas.    She had a second opinion in December 2021 at the [facility] seeing Dr. ELVER Ortega who concurred with the need for continued surveillance.  She was inquiring about experimental therapies and there are none.    Current MRI examination is compared with the baseline postop imaging study and there is no evidence of progressive tumor growth in the prior surgical fields.  There is evidence of post resection artifact and denervation atrophy of the  surrounding hamstring muscles.  No enlarging lipomatous masses identified.      Assessment:   I reviewed the MRI findings with Viv and informed her that I see no evidence of tumor regrowth requiring surgical intervention.  Nonetheless, she is at high risk for tumor recurrence despite the aggressive surgical treatment and radiation.    Plan:  Return for follow-up in 12 months with repeat follow-up MRI examination extending from the iliac crest to knee joint until 5 years postop.      John Reyes MD  [facility] Family Professor  Oncology and Adult Reconstructive Surgery  Dept Orthopaedic Surgery, [facility]  [phone] office, [phone] pager  [email or web address]    Total combined visit time and work time before and after clinic visit = 20 min

## 2023-04-03 NOTE — LETTER
4/3/2023         RE: Janey Bo  [address] [facility] [place] [address]        Dear Colleague,    Thank you for referring your patient, Janey Bo, to the [facility] [place]. Please see a copy of my visit note below.        [facility], Orthopaedic Oncology Surgery Consultation  by John Reyes M.D.    Janey Bo MRN# [id number]   Age: 68 year old YOB: 1950     Requesting physician: Dr. Jerod Redding, med oncology, [facility]  Dr. 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  H/o thyroid CA, breast CA, pancreatic CA    Procedures:  11/07/14, Left thigh mass marginal excision (Eric)  1/20/2017, Excision L thigh atypical lipomatous tumor 7 cm, deep. (Eric) [facility]  Jan-March 2021, External beam RT 50 Gy L thigh (Dixie) [facility]  4/16/2021, Intralesional excision, L thigh intraneural atypical lipomatous tumor (Eric) [facility]      Viv returns to see me today for 2 yr checkup after undergoing the above-mentioned surgery whereby her tumor was excised from within her sciatic nerve.  The tumor had infiltrated and was entwined within the nerve fascicles, and given the atypical, low-grade nature of this tumor, the sciatic nerve was preserved at the expense of sustaining positive margins.    Symptoms of discomfort consisting of mainly sensory symptoms of numbness and tingling have improved markedly she only has some residual symptoms along the lateral leg.      MRI examination reveals no evidence of recurrent tumor formation or questionable areas.    She had a second opinion in December 2021 at the [facility] seeing Dr. ELVER Ortega who concurred with the need for continued surveillance.  She was inquiring about experimental therapies and there  are none.    Current MRI examination is compared with the baseline postop imaging study and there is no evidence of progressive tumor growth in the prior surgical fields.  There is evidence of post resection artifact and denervation atrophy of the surrounding hamstring muscles.  No enlarging lipomatous masses identified.      Assessment:   I reviewed the MRI findings with Viv and informed her that I see no evidence of tumor regrowth requiring surgical intervention.  Nonetheless, she is at high risk for tumor recurrence despite the aggressive surgical treatment and radiation.    Plan:  Return for follow-up in 12 months with repeat follow-up MRI examination extending from the iliac crest to knee joint until 5 years postop.      MD Brianna Sommers Family Professor  Oncology and Adult Reconstructive Surgery  Dept Orthopaedic Surgery, [facility]  [phone] office, [phone] pager  [email or web address]    Total combined visit time and work time before and after clinic visit = 20 min

## 2023-04-03 NOTE — NURSING NOTE
Chief Complaint   Patient presents with     RECHECK     Follow-up to discuss new MRI left femur thigh images. No new concerns.        72 year old  1950    There were no vitals taken for this visit.    Past Surgical History:   Procedure Laterality Date     APPENDECTOMY       CHOLECYSTECTOMY       COLONOSCOPY       EXCISE MASS LOWER EXTREMITY Left 11/7/2014    Procedure: EXCISE MASS LOWER EXTREMITY;  Surgeon: John Reyes MD;  Location: [facility] OR     EXCISE VILLALOBOS'S NEUROMA FOOT       EXCISE SOFT TISSUE TUMOR THIGH Left 1/20/2017    Procedure: EXCISE SOFT TISSUE TUMOR THIGH;  Surgeon: John Reyes MD;  Location: [facility] OR     EXCISE SOFT TISSUE TUMOR THIGH Left 4/16/2021    Procedure: Excision of intraneural lipomatous tumor Left posterior thigh;  Surgeon: John Reyes MD;  Location: [facility] OR     INJECT STEROID (LOCATION) Left 4/16/2021    Procedure: Left shoulder subacromial bursa steroid injection;  Surgeon: John Reyes MD;  Location: [facility] OR     INNER EAR SURGERY       LUMPECTOMY BREAST       OOPHORECTOMY       partial hysterectomy       thyroidectomy       TONSILLECTOMY & ADENOIDECTOMY       WHIPPLE PROCEDURE       ZZ GASTROSCOPY,FL       ZC HAND/FINGER SURGERY UNLISTED       [facility] [[email or web address]] STOMACH SURGERY PROCEDURE UNLISTED           Pain Assessment  Patient Currently in Pain: No (Discomfort)        [place], [place] - [address]        Allergies   Allergen Reactions     Black Arma Pollen Allergy Skin Test Anaphylaxis     Nuts Anaphylaxis     thickened throat     Pineapple Anaphylaxis and Other (See Comments)     Thickening in throat  thickened throat       Black Arma Flavor Other (See Comments)     thickened throat  thickened throat     Liquid Adhesive Other (See Comments)     Tears my skin off     Morphine Itching and Other (See Comments)     Itchiness       Morphine Hcl Itching           Current Outpatient Medications   Medication     diltiazem ER (DILT-XR) 180 MG 24 hr capsule      gabapentin (NEURONTIN) 300 MG capsule     hydrOXYzine (ATARAX) 10 MG tablet     levothyroxine (SYNTHROID/LEVOTHROID) 150 MCG tablet     tiZANidine (ZANAFLEX) 4 MG tablet     albuterol (PROAIR HFA/PROVENTIL HFA/VENTOLIN HFA) 108 (90 Base) MCG/ACT inhaler     amylase-lipase-protease (CREON 24) 25561-16946 units CPEP per EC capsule     aspirin 81 MG tablet     biotin 5 MG CAPS     buPROPion (WELLBUTRIN SR) 200 MG 12 hr tablet     buPROPion (WELLBUTRIN) 100 MG tablet     butalbital-aspirin-caffeine (FIORINAL) -40 MG capsule     calcium carbonate 750 MG CHEW     Calcium-Magnesium-Vitamin D (CALCIUM MAGNESIUM PO)     cholecalciferol (VITAMIN D3) 10 mcg (400 units) TABS tablet     DilTIAZem HCl Coated Beads (DILTIAZEM CD PO)     escitalopram (LEXAPRO) 10 MG tablet     fluorouracil (EFUDEX) 5 % external cream     gabapentin (NEURONTIN) 300 MG capsule     hydrOXYzine (ATARAX) 10 MG tablet     ibuprofen (ADVIL/MOTRIN) 200 MG capsule     levothyroxine (SYNTHROID/LEVOTHROID) 150 MCG tablet     methylphenidate (RITALIN) 10 MG tablet     methylphenidate (RITALIN) 20 MG tablet     metroNIDAZOLE (METROGEL) 0.75 % external gel     NAPROXEN SODIUM PO     potassium chloride ER (KLOR-CON M) 20 MEQ CR tablet     Probiotic Product (ALIGN) CAPS     raloxifene (EVISTA) 60 MG tablet     sucralfate (CARAFATE) 1 GM/10ML suspension     tiZANidine (ZANAFLEX) 2 MG tablet     VITAMIN E BLEND PO     No current facility-administered medications for this visit.         Questionnaires:    HOOS Hip Dysfunction & Osteoarthritis Outcome Questionnaire         View : No data to display.                 KOOS Knee Survey Assessment         View : No data to display.                   Promis 10 Assessment        4/3/2023     2:36 PM   PROMIS 10   In general, would you say your health is: Very good   In general, would you say your quality of life is: Very good   In general, how would you rate your physical health? Good   In general, how would you rate  your mental health, including your mood and your ability to think? Good   In general, how would you rate your satisfaction with your social activities and relationships? Excellent   In general, please rate how well you carry out your usual social activities and roles Very good   To what extent are you able to carry out your everyday physical activities such as walking, climbing stairs, carrying groceries, or moving a chair? Moderately   In the past 7 days, how often have you been bothered by emotional problems such as feeling anxious, depressed, or irritable? Sometimes   In the past 7 days, how would you rate your fatigue on average? Moderate   In the past 7 days, how would you rate your pain on average, where 0 means no pain, and 10 means worst imaginable pain? 3   In general, would you say your health is: 4   In general, would you say your quality of life is: 4   In general, how would you rate your physical health? 3   In general, how would you rate your mental health, including your mood and your ability to think? 3   In general, how would you rate your satisfaction with your social activities and relationships? 5   In general, please rate how well you carry out your usual social activities and roles. (This includes activities at home, at work and in your community, and responsibilities as a parent, child, spouse, employee, friend, etc.) 4   To what extent are you able to carry out your everyday physical activities such as walking, climbing stairs, carrying groceries, or moving a chair? 3   In the past 7 days, how often have you been bothered by emotional problems such as feeling anxious, depressed, or irritable? 3   In the past 7 days, how would you rate your fatigue on average? 3   In the past 7 days, how would you rate your pain on average, where 0 means no pain, and 10 means worst imaginable pain? 3   Global Mental Health Score 15   Global Physical Health Score 13   PROMIS TOTAL - SUBSCORES 28          Ortho  [place] Knee Questionnaire         View : No data to display.

## 2023-06-27 ENCOUNTER — TELEPHONE (OUTPATIENT)
Dept: ORTHOPEDICS | Facility: CLINIC | Age: 73
End: 2023-06-27
Payer: MEDICARE

## 2023-06-27 DIAGNOSIS — G57.02 NEUROPATHY, SCIATIC, LEFT: Primary | ICD-10-CM

## 2023-06-27 NOTE — TELEPHONE ENCOUNTER
[facility] Call Center    Phone Message    May a detailed message be left on voicemail: yes     Reason for Call: Other: Pt needs a referral for foot surgery for hammer toe also referral to a neurologist for leg        Action Taken: Other: [facility]    Travel Screening: Not Applicable

## 2023-06-28 NOTE — TELEPHONE ENCOUNTER
I routed this patient's question to Dr. Reyes's nurse Melody Ferris RN who stated that we could put in the Neurology referral and direct them to schedule with Dr. Caldera or Dr. Mustafa for the hammer toe. I called the patient to let them know that I put the order in for the referral and gave them the Orthopedics scheduling line to call and schedule with one of the foot and ankle surgeons.    Gaston Escalante, EMT

## 2023-08-20 ENCOUNTER — HEALTH MAINTENANCE LETTER (OUTPATIENT)
Age: 73
End: 2023-08-20

## 2023-10-24 NOTE — TELEPHONE ENCOUNTER
Records Requested     October 24, 2023 9:50 AM   [id number]   Facility  [facility]   Outcome 9:52 am Sent request for imaging to be pushed to PACS. -SALO [place] on 11/14/2023 at 10:20 AM Imaging resolved into PACS. -SALO     RECORDS RECEIVED FROM: Care Everywhere   REASON FOR VISIT: Sciatic nerve compression due to infiltration with intraneural tumor   Date of Appt: 11/30/23 @ 1:00 pm    NOTES (FOR ALL VISITS) STATUS DETAILS   OFFICE NOTE from referring provider Internal 6/27/23 (Phone Note), 4/3/23, 10/6/22, 4/11/22 John Reyes MD @[facility]Or[facility]    See Additional Encounters   OFFICE NOTE from other specialist Care Everywhere 12/26/22, 11/1/21 David Mejias MD @[facility]     MEDICATION LIST Internal    IMAGING  (FOR ALL VISITS)     MRI (HEAD, NECK, SPINE) PACS [facility]  4/3/23 MR Femur Thigh Left  10/6/22 MR Femur Thigh Left  4/11/22 MR Femur Thigh Left  11/8/21 MR Femur Thigh Left    [place]  12/5/22 MR Femur Left

## 2023-10-29 ENCOUNTER — HEALTH MAINTENANCE LETTER (OUTPATIENT)
Age: 73
End: 2023-10-29

## 2023-11-27 NOTE — PROGRESS NOTES
"  DEPARTMENT OF NEUROLOGY  Referral for: \"sciatic nerve compression\"  Patient Name:  Janey Bo  MRN:  [id number]    :  1950  Date of Clinic Visit:  2023  Primary Care Provider:  David Mejias  Referring Provider: Dr. 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  History reveals symptoms for about 1 year, that sound consistent with involvement of the sciatic nerve.  Difficult to localize where symptoms are coming from with the presence of both lower back pain and a history of thigh surgery.  Also reporting restless leg sensation , which may be unrelated to we will workup with a ferritin level and increase nighttime gabapentin. Discussed workup will include a lumbar MRI as well as an EMG to hopefully help with localization.  In addition patient is reporting history of encephalitis, though I am unable to see the records from [facility] pertaining to this diagnosis.  She does have brisk reflexes throughout on exam and some slowed processing therefore we will obtain an MRI brain to evaluate any intracranial changes, as well as obtain records from [facility].  Finally because of the brisk reflexes and reported neck pain we will obtain an MRI of her cervical spine.    Plan:  - EMG/NCS  - MRI brain without contrast  - MRI cervical spine without contrast  - MRI lumbar spine without contrast  - MRI lumbar spine  - Ferritin level  - Advised to increase gabapentin to 600 mg at night. If symptoms persist and no side effects, can increased to 900 mg at night.  - Follow up in 3 months    Patient was discussed with Dr. Maya.    Suly Voss, " MD  Neurology Resident, PGY4  [facility] Department of Neurology    Neurology Staff Addendum    I discussed the patient, but was not onsite due to some upper respiratory symptoms, with Dr Voss on 11-30-23.  I agreed with the assessment and plan to evaluate for both a peripheral pain syndrome in the LLE as well as an evaluation of the brisk reflexies and history of encephalitis with MRI of head and C Spine.      Russ Maya MD       ___________________________________________    CHIEF COMPLAINT: Left leg pain    HISTORY OF PRESENT ILLNESS:  Janey Bo is a  73 year old female with past medical history significant for thyroid cancer s/p thyroidectomy now with post operative hypothyroid, type 2 diabetes, HTN presenting as a referral from orthopedic surgeon for left leg pain.  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  Per orthopedic surgeon's note the tumor was infiltrating and entwined within the nerve fascicles of the sciatic nerve.  She has had a follow-up MRI thigh imaging with no evidence of recurrence of the tumor, though continues with left leg symptoms.    Patient reports that she has been experiencing left leg pain for about a year.  She said that the pain is primarily located on the left lateral thigh and left lateral lower leg.  It is described as an aching tight painful sensation.  It is present daily, and made worse when she is sitting down for too long.  She is able to walk though does endorse some leg weakness.  She is unsure if the weakness is from having her hamstring affected during surgery or if because of the current symptoms.  Does say if she hunches forward slightly she is able to ambulate a little bit better.  Denies any falls.  No issues with bowel or bladder control.  No upper extremity symptoms.    She does also endorse having sensation of restlessness in her legs when she lays down at night.  She says that she has a  sensation that she needs to move them or get out of bed and walk in order to alleviate the symptoms.  This started more recently over the past few weeks.    Of note, patient reports that she has a history of encephalitis 1 year ago.  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  She was on steroids.  Reports that she had an MRI brain that help support the diagnosis, but she declined having a lumbar puncture.  Today she increase that she can get another picture of her brain.  She thinks that her memory and thinking is slow though nearing her baseline.    PHYSICAL EXAMINATION:  Vitals: /61 (BP Location: Left arm, Patient Position: Sitting, Cuff Size: Adult Regular)   Pulse 81   Ht 1.524 m (5')   Wt 77.1 kg (170 lb)   SpO2 94%   BMI 33.20 kg/m      General: No acute distress    Neurologic Exam:  Mental status: Patient is oriented to person, place and time and able to provide a detailed account of history of illness. Attention and fund of knowledge are normal. Speech is fluent; comprehension, repetition and naming are normal.    Cranial nerves: Pupils are round and react normally to light and accommodation. Visual fields are full and extraocular movements are normal. Facial strength and sensation are normal. Hearing is normal to conversation. Palate rises symmetrically and there is no dysarthria. Tongue protrusion is normal.    Motor/Strength: Strength is 5/5 throughout upper and lower extremity symptoms, aside from left hip flexor 4+/5.  Mild intention tremor.  Normal tone throughout upper and lower extremities.    Sensation: Sensation slightly diminished to pinprick on the lateral aspect of left foot otherwise normal throughout upper and lower extremities.  Vibratory sensation unremarkable.    Reflexes: Brisk biceps, brachioradialis, patella, Achilles.  Negative Stone.  No clonus at ankles.    Coordination: Finger-to-nose are symmetric and normal  bilaterally. Rapid alternating movements are symmetric in the extremities. No pronator drift.    Gait: Gait is narrow based and steady, though slightly hunched over.  Unable to walk in tandem or on heels or toes.      INVESTIGATIONS:   MR LEFT FEMUR WITH AND WITHOUT CONTRAST 4/3/2023 2:07 PM   IMPRESSION: Stable resection changes of atypical lipomatous tumor in  the posterior left thigh with similar appearing fat intensity lesion  interspersed with left sciatic nerve.    MR left femur with and without contrast 10/6/2022 1:01 PM   Impression: Stable postsurgical changes of posterior thigh atypical  lipomatous tumor resection with unchanged residual fat intensity mass  interspersed with sciatic nerve.     ALLERGIES:  Allergies   Allergen Reactions     [place] Pollen Allergy Skin Test Anaphylaxis     Nuts Anaphylaxis     thickened throat     Pineapple Anaphylaxis and Other (See Comments)     Thickening in throat  thickened throat       [place] Flavor Other (See Comments)     thickened throat  thickened throat     Liquid Adhesive Other (See Comments)     Tears my skin off     Morphine Itching and Other (See Comments)     Itchiness       Morphine Hcl Itching     MEDICATIONS:  Current Outpatient Medications   Medication Sig Dispense Refill     albuterol (PROAIR HFA/PROVENTIL HFA/VENTOLIN HFA) 108 (90 Base) MCG/ACT inhaler Inhale 2 puffs into the lungs every 6 hours as needed        amylase-lipase-protease (CREON 24) 89936-25782 units CPEP per EC capsule Take 1 capsule by mouth as needed        aspirin 81 MG tablet Take 1 tablet by mouth daily        biotin 5 MG CAPS 5 mg        buPROPion (WELLBUTRIN SR) 200 MG 12 hr tablet Take 2 tablets by mouth every morning        buPROPion (WELLBUTRIN) 100 MG tablet        butalbital-aspirin-caffeine (FIORINAL) -40 MG capsule        calcium carbonate 750 MG CHEW Take 750 mg by mouth daily       Calcium-Magnesium-Vitamin D (CALCIUM MAGNESIUM PO) Take 600 mg by mouth        cholecalciferol (VITAMIN D3) 10 mcg (400 units) TABS tablet Take 400 Units by mouth       diltiazem ER (DILT-XR) 180 MG 24 hr capsule daily       DilTIAZem HCl Coated Beads (DILTIAZEM CD PO) Take by mouth every morning        escitalopram (LEXAPRO) 10 MG tablet Take by mouth every evening        fluorouracil (EFUDEX) 5 % external cream        gabapentin (NEURONTIN) 300 MG capsule Take 1 tablet by mouth At Bedtime       gabapentin (NEURONTIN) 300 MG capsule Take 1 capsule (300 mg) by mouth 3 times daily Start 1 capsule by mouth at nighttime only.  May increase to 2 or 3 times daily as needed.  If this is not sufficient, may also increase dosage to 600 mg (2 capsules) up to 3 times per day.  If no response despite increasing the dosage, would discontinue medication altogether due to potential for dependency. 30 capsule 3     hydrOXYzine (ATARAX) 10 MG tablet Take 10 mg by mouth       ibuprofen (ADVIL/MOTRIN) 200 MG capsule Take 400 mg by mouth every 4 hours as needed for fever       levothyroxine (SYNTHROID/LEVOTHROID) 150 MCG tablet Take 150 mcg by mouth       levothyroxine (SYNTHROID/LEVOTHROID) 150 MCG tablet Take 150 mcg by mouth every morning        methylphenidate (RITALIN) 10 MG tablet every morning        methylphenidate (RITALIN) 20 MG tablet        metroNIDAZOLE (METROGEL) 0.75 % external gel At Bedtime       NAPROXEN SODIUM PO        potassium chloride ER (K-TAB/KLOR-CON) 10 MEQ CR tablet        potassium chloride ER (KLOR-CON M) 20 MEQ CR tablet Take 20 mEq by mouth every morning        Probiotic Product (ALIGN) CAPS Take 4 mg by mouth 2 times daily        raloxifene (EVISTA) 60 MG tablet Take 60 mg by mouth every morning        sucralfate (CARAFATE) 1 GM/10ML suspension Take 1,000 mg by mouth as needed       tiZANidine (ZANAFLEX) 2 MG tablet Take 2 mg by mouth At Bedtime        tiZANidine (ZANAFLEX) 4 MG tablet Take 4 mg by mouth       VITAMIN E BLEND PO        hydrOXYzine (ATARAX) 10 MG tablet Take  10 mg by mouth (Patient not taking: Reported on 11/30/2023)       PAST MEDICAL HISTORY:  Past Medical History:   Diagnosis Date     Anxiety      Arrhythmia      Breast cancer (H)      Carpal tunnel syndrome      Chronic osteoarthritis      Depressive disorder      Diabetes (H) 2018     Edema      Fatigue      Gastro-oesophageal reflux disease      Heart murmur      Hyperlipaemia      Migraines      Osteoarthrosis      Palpitations      Pancreatic cancer (H)      Pancreatic disease      PONV (postoperative nausea and vomiting)      PVC's (premature ventricular contractions)      Scoliosis      Shortness of breath      Thyroid disease     Hypothyroidsim     Uncomplicated asthma     Exercise induced.      PAST SURGICAL HISTORY:  Past Surgical History:   Procedure Laterality Date     APPENDECTOMY       CHOLECYSTECTOMY       COLONOSCOPY       EXCISE MASS LOWER EXTREMITY Left 11/7/2014    Procedure: EXCISE MASS LOWER EXTREMITY;  Surgeon: John Reyes MD;  Location: [facility] OR     EXCISE VILLALOBOS'S NEUROMA FOOT       EXCISE SOFT TISSUE TUMOR THIGH Left 1/20/2017    Procedure: EXCISE SOFT TISSUE TUMOR THIGH;  Surgeon: John Reyes MD;  Location: [facility] OR     EXCISE SOFT TISSUE TUMOR THIGH Left 4/16/2021    Procedure: Excision of intraneural lipomatous tumor Left posterior thigh;  Surgeon: John Reyes MD;  Location: [facility] OR     INJECT STEROID (LOCATION) Left 4/16/2021    Procedure: Left shoulder subacromial bursa steroid injection;  Surgeon: John Reyes MD;  Location: [facility] OR     INNER EAR SURGERY       LUMPECTOMY BREAST       OOPHORECTOMY       partial hysterectomy       thyroidectomy       TONSILLECTOMY & ADENOIDECTOMY       WHIPPLE PROCEDURE       ZZ GASTROSCOPY,FL       ZZC HAND/FINGER SURGERY UNLISTED       ZZC STOMACH SURGERY PROCEDURE UNLISTED       SOCIAL HISTORY:  Social History     Socioeconomic History     Marital status:    Tobacco Use     Smoking status: Former     Packs/day: 1.50     Years: 15.00      Additional pack years: 0.00     Total pack years: 22.50     Types: Cigarettes     Smokeless tobacco: Never     Tobacco comments:     quit 30 yrs ago   Substance and Sexual Activity     Alcohol use: No     Drug use: No     Sexual activity: Not Currently     Partners: Male   Other Topics Concern     Not on file   Social History Narrative     Not on file       FAMILY HISTORY:  Family History   Problem Relation Age of Onset     Asthma Paternal Grandfather      Obesity Paternal Grandfather      Colon Cancer Paternal Grandfather      Cancer Paternal Grandfather      Other Cancer Paternal Grandfather      Diabetes Father      Hypertension Father      Osteoporosis Father      Cerebrovascular Disease Father      Thyroid Disease Father      Obesity Father      Other Cancer Father      Migraines Father      Hypothyroidism Father      Spine Problems Father      Diabetes Paternal Grandmother      Osteoporosis Paternal Grandmother      Thyroid Disease Paternal Grandmother      Hypothyroidism Paternal Grandmother      Low Back Problems Paternal Grandmother      Spine Problems Paternal Grandmother      Osteoporosis Mother      Cerebrovascular Disease Mother      Thyroid Disease Mother      Deep Vein Thrombosis Mother      Hypothyroidism Mother      Spine Problems Mother      Low Back Problems Mother       () Other

## 2023-11-30 ENCOUNTER — PRE VISIT (OUTPATIENT)
Dept: NEUROLOGY | Facility: CLINIC | Age: 73
End: 2023-11-30

## 2023-11-30 ENCOUNTER — OFFICE VISIT (OUTPATIENT)
Dept: NEUROLOGY | Facility: CLINIC | Age: 73
End: 2023-11-30
Attending: ORTHOPAEDIC SURGERY
Payer: MEDICARE

## 2023-11-30 ENCOUNTER — TELEPHONE (OUTPATIENT)
Dept: NEUROLOGY | Facility: CLINIC | Age: 73
End: 2023-11-30

## 2023-11-30 VITALS
WEIGHT: 170 LBS | OXYGEN SATURATION: 94 % | HEIGHT: 60 IN | HEART RATE: 81 BPM | SYSTOLIC BLOOD PRESSURE: 121 MMHG | DIASTOLIC BLOOD PRESSURE: 61 MMHG | BODY MASS INDEX: 33.38 KG/M2

## 2023-11-30 DIAGNOSIS — M54.2 NECK PAIN: ICD-10-CM

## 2023-11-30 DIAGNOSIS — R93.0 ABNORMAL MRI OF HEAD: Primary | ICD-10-CM

## 2023-11-30 DIAGNOSIS — G57.02 NEUROPATHY, SCIATIC, LEFT: ICD-10-CM

## 2023-11-30 DIAGNOSIS — G25.81 RESTLESS LEG SYNDROME: ICD-10-CM

## 2023-11-30 PROCEDURE — 99207 PR NO CHARGE LOS: CPT | Performed by: STUDENT IN AN ORGANIZED HEALTH CARE EDUCATION/TRAINING PROGRAM

## 2023-11-30 RX ORDER — POTASSIUM CHLORIDE 750 MG/1
4 TABLET, EXTENDED RELEASE ORAL EVERY MORNING
COMMUNITY
Start: 2023-04-21

## 2023-11-30 RX ORDER — GABAPENTIN 300 MG/1
300 CAPSULE ORAL 3 TIMES DAILY
Qty: 90 CAPSULE | Refills: 3 | Status: SHIPPED | OUTPATIENT
Start: 2023-11-30 | End: 2023-11-30

## 2023-11-30 RX ORDER — GABAPENTIN 300 MG/1
300 CAPSULE ORAL 3 TIMES DAILY
Qty: 90 CAPSULE | Refills: 3 | Status: SHIPPED | OUTPATIENT
Start: 2023-11-30 | End: 2024-07-05

## 2023-11-30 ASSESSMENT — PAIN SCALES - GENERAL: PAINLEVEL: MILD PAIN (2)

## 2023-11-30 NOTE — LETTER
"2023       RE: Janey Bo  [address] [facility] [place] [address]       Dear Colleague,    Thank you for referring your patient, Janey Bo, to the [facility] NEUROLOGY CLINIC [place] at [facility]. Please see a copy of my visit note below.      DEPARTMENT OF NEUROLOGY  Referral for: \"sciatic nerve compression\"  Patient Name:  Janey Bo  MRN:  [id number]    :  1950  Date of Clinic Visit:  2023  Primary Care Provider:  David Mejias  Referring Provider: Dr. 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  History reveals symptoms for about 1 year, that sound consistent with involvement of the sciatic nerve.  Difficult to localize where symptoms are coming from with the presence of both lower back pain and a history of thigh surgery.  Also reporting restless leg sensation , which may be unrelated to we will workup with a ferritin level and increase nighttime gabapentin. Discussed workup will include a lumbar MRI as well as an EMG to hopefully help with localization.  In addition patient is reporting history of encephalitis, though I am unable to see the records from [place] pertaining to this diagnosis.  She does have brisk reflexes throughout on exam and some slowed processing therefore we will obtain an MRI brain to evaluate any intracranial changes, as well as obtain records from [place].  Finally because of the brisk reflexes and reported neck pain we will obtain an MRI of her cervical spine.    Plan:  - EMG/NCS  - MRI brain without contrast  - " MRI cervical spine without contrast  - MRI lumbar spine without contrast  - MRI lumbar spine  - Ferritin level  - Advised to increase gabapentin to 600 mg at night. If symptoms persist and no side effects, can increased to 900 mg at night.  - Follow up in 3 months    Patient was discussed with Dr. Maya.    Suly Voss MD  Neurology Resident, PGY4  [facility] Department of Neurology    Neurology Staff Addendum    I discussed the patient, but was not onsite due to some upper respiratory symptoms, with Dr Voss on 11-30-23.  I agreed with the assessment and plan to evaluate for both a peripheral pain syndrome in the LLE as well as an evaluation of the brisk reflexies and history of encephalitis with MRI of head and C Spine.      Russ Maya MD       ___________________________________________    CHIEF COMPLAINT: Left leg pain    HISTORY OF PRESENT ILLNESS:  Janey Bo is a  73 year old female with past medical history significant for thyroid cancer s/p thyroidectomy now with post operative hypothyroid, type 2 diabetes, HTN presenting as a referral from orthopedic surgeon for left leg pain.  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  Per orthopedic surgeon's note the tumor was infiltrating and entwined within the nerve fascicles of the sciatic nerve.  She has had a follow-up MRI thigh imaging with no evidence of recurrence of the tumor, though continues with left leg symptoms.    Patient reports that she has been experiencing left leg pain for about a year.  She said that the pain is primarily located on the left lateral thigh and left lateral lower leg.  It is described as an aching tight painful sensation.  It is present daily, and made worse when she is sitting down for too long.  She is able to walk though does endorse some leg weakness.  She is unsure if the weakness is from having her hamstring affected during surgery or if because of  the current symptoms.  Does say if she hunches forward slightly she is able to ambulate a little bit better.  Denies any falls.  No issues with bowel or bladder control.  No upper extremity symptoms.    She does also endorse having sensation of restlessness in her legs when she lays down at night.  She says that she has a sensation that she needs to move them or get out of bed and walk in order to alleviate the symptoms.  This started more recently over the past few weeks.    Of note, patient reports that she has a history of encephalitis 1 year ago.  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  She was on steroids.  Reports that she had an MRI brain that help support the diagnosis, but she declined having a lumbar puncture.  Today she increase that she can get another picture of her brain.  She thinks that her memory and thinking is slow though nearing her baseline.    PHYSICAL EXAMINATION:  Vitals: /61 (BP Location: Left arm, Patient Position: Sitting, Cuff Size: Adult Regular)   Pulse 81   Ht 1.524 m (5')   Wt 77.1 kg (170 lb)   SpO2 94%   BMI 33.20 kg/m      General: No acute distress    Neurologic Exam:  Mental status: Patient is oriented to person, place and time and able to provide a detailed account of history of illness. Attention and fund of knowledge are normal. Speech is fluent; comprehension, repetition and naming are normal.    Cranial nerves: Pupils are round and react normally to light and accommodation. Visual fields are full and extraocular movements are normal. Facial strength and sensation are normal. Hearing is normal to conversation. Palate rises symmetrically and there is no dysarthria. Tongue protrusion is normal.    Motor/Strength: Strength is 5/5 throughout upper and lower extremity symptoms, aside from left hip flexor 4+/5.  Mild intention tremor.  Normal tone throughout upper and lower extremities.    Sensation: Sensation  slightly diminished to pinprick on the lateral aspect of left foot otherwise normal throughout upper and lower extremities.  Vibratory sensation unremarkable.    Reflexes: Brisk biceps, brachioradialis, patella, Achilles.  Negative Stone.  No clonus at ankles.    Coordination: Finger-to-nose are symmetric and normal bilaterally. Rapid alternating movements are symmetric in the extremities. No pronator drift.    Gait: Gait is narrow based and steady, though slightly hunched over.  Unable to walk in tandem or on heels or toes.      INVESTIGATIONS:   MR LEFT FEMUR WITH AND WITHOUT CONTRAST 4/3/2023 2:07 PM   IMPRESSION: Stable resection changes of atypical lipomatous tumor in  the posterior left thigh with similar appearing fat intensity lesion  interspersed with left sciatic nerve.    MR left femur with and without contrast 10/6/2022 1:01 PM   Impression: Stable postsurgical changes of posterior thigh atypical  lipomatous tumor resection with unchanged residual fat intensity mass  interspersed with sciatic nerve.     ALLERGIES:  Allergies   Allergen Reactions    [place] Pollen Allergy Skin Test Anaphylaxis    Nuts Anaphylaxis     thickened throat    Pineapple Anaphylaxis and Other (See Comments)     Thickening in throat  thickened throat      [place] Flavor Other (See Comments)     thickened throat  thickened throat    Liquid Adhesive Other (See Comments)     Tears my skin off    Morphine Itching and Other (See Comments)     Itchiness      Morphine Hcl Itching     MEDICATIONS:  Current Outpatient Medications   Medication Sig Dispense Refill    albuterol (PROAIR HFA/PROVENTIL HFA/VENTOLIN HFA) 108 (90 Base) MCG/ACT inhaler Inhale 2 puffs into the lungs every 6 hours as needed       amylase-lipase-protease (CREON 24) 22078-85405 units CPEP per EC capsule Take 1 capsule by mouth as needed       aspirin 81 MG tablet Take 1 tablet by mouth daily       biotin 5 MG CAPS 5 mg       buPROPion (WELLBUTRIN SR) 200 MG  12 hr tablet Take 2 tablets by mouth every morning       buPROPion (WELLBUTRIN) 100 MG tablet       butalbital-aspirin-caffeine (FIORINAL) -40 MG capsule       calcium carbonate 750 MG CHEW Take 750 mg by mouth daily      Calcium-Magnesium-Vitamin D (CALCIUM MAGNESIUM PO) Take 600 mg by mouth      cholecalciferol (VITAMIN D3) 10 mcg (400 units) TABS tablet Take 400 Units by mouth      diltiazem ER (DILT-XR) 180 MG 24 hr capsule daily      DilTIAZem HCl Coated Beads (DILTIAZEM CD PO) Take by mouth every morning       escitalopram (LEXAPRO) 10 MG tablet Take by mouth every evening       fluorouracil (EFUDEX) 5 % external cream       gabapentin (NEURONTIN) 300 MG capsule Take 1 tablet by mouth At Bedtime      gabapentin (NEURONTIN) 300 MG capsule Take 1 capsule (300 mg) by mouth 3 times daily Start 1 capsule by mouth at nighttime only.  May increase to 2 or 3 times daily as needed.  If this is not sufficient, may also increase dosage to 600 mg (2 capsules) up to 3 times per day.  If no response despite increasing the dosage, would discontinue medication altogether due to potential for dependency. 30 capsule 3    hydrOXYzine (ATARAX) 10 MG tablet Take 10 mg by mouth      ibuprofen (ADVIL/MOTRIN) 200 MG capsule Take 400 mg by mouth every 4 hours as needed for fever      levothyroxine (SYNTHROID/LEVOTHROID) 150 MCG tablet Take 150 mcg by mouth      levothyroxine (SYNTHROID/LEVOTHROID) 150 MCG tablet Take 150 mcg by mouth every morning       methylphenidate (RITALIN) 10 MG tablet every morning       methylphenidate (RITALIN) 20 MG tablet       metroNIDAZOLE (METROGEL) 0.75 % external gel At Bedtime      NAPROXEN SODIUM PO       potassium chloride ER (K-TAB/KLOR-CON) 10 MEQ CR tablet       potassium chloride ER (KLOR-CON M) 20 MEQ CR tablet Take 20 mEq by mouth every morning       Probiotic Product (ALIGN) CAPS Take 4 mg by mouth 2 times daily       raloxifene (EVISTA) 60 MG tablet Take 60 mg by mouth every morning        sucralfate (CARAFATE) 1 GM/10ML suspension Take 1,000 mg by mouth as needed      tiZANidine (ZANAFLEX) 2 MG tablet Take 2 mg by mouth At Bedtime       tiZANidine (ZANAFLEX) 4 MG tablet Take 4 mg by mouth      VITAMIN E BLEND PO       hydrOXYzine (ATARAX) 10 MG tablet Take 10 mg by mouth (Patient not taking: Reported on 11/30/2023)       PAST MEDICAL HISTORY:  Past Medical History:   Diagnosis Date    Anxiety     Arrhythmia     Breast cancer (H)     Carpal tunnel syndrome     Chronic osteoarthritis     Depressive disorder     Diabetes (H) 2018    Edema     Fatigue     Gastro-oesophageal reflux disease     Heart murmur     Hyperlipaemia     Migraines     Osteoarthrosis     Palpitations     Pancreatic cancer (H)     Pancreatic disease     PONV (postoperative nausea and vomiting)     PVC's (premature ventricular contractions)     Scoliosis     Shortness of breath     Thyroid disease     Hypothyroidsim    Uncomplicated asthma     Exercise induced.      PAST SURGICAL HISTORY:  Past Surgical History:   Procedure Laterality Date    APPENDECTOMY      CHOLECYSTECTOMY      COLONOSCOPY      EXCISE MASS LOWER EXTREMITY Left 11/7/2014    Procedure: EXCISE MASS LOWER EXTREMITY;  Surgeon: John Reyes MD;  Location: [facility] OR    EXCISE VILLALOBOS'S NEUROMA FOOT      EXCISE SOFT TISSUE TUMOR THIGH Left 1/20/2017    Procedure: EXCISE SOFT TISSUE TUMOR THIGH;  Surgeon: John Reyes MD;  Location: [facility] OR    EXCISE SOFT TISSUE TUMOR THIGH Left 4/16/2021    Procedure: Excision of intraneural lipomatous tumor Left posterior thigh;  Surgeon: John Reyes MD;  Location: [facility] OR    INJECT STEROID (LOCATION) Left 4/16/2021    Procedure: Left shoulder subacromial bursa steroid injection;  Surgeon: John Reyes MD;  Location: [facility] OR    INNER EAR SURGERY      LUMPECTOMY BREAST      OOPHORECTOMY      partial hysterectomy      thyroidectomy      TONSILLECTOMY & ADENOIDECTOMY      WHIPPLE PROCEDURE      ZZ GASTROSCOPY,FL      ZZC HAND/FINGER  SURGERY UNLISTED      ZZC STOMACH SURGERY PROCEDURE UNLISTED       SOCIAL HISTORY:  Social History     Socioeconomic History    Marital status:    Tobacco Use    Smoking status: Former     Packs/day: 1.50     Years: 15.00     Additional pack years: 0.00     Total pack years: 22.50     Types: Cigarettes    Smokeless tobacco: Never    Tobacco comments:     quit 30 yrs ago   Substance and Sexual Activity    Alcohol use: No    Drug use: No    Sexual activity: Not Currently     Partners: Male   Other Topics Concern    Not on file   Social History Narrative    Not on file       FAMILY HISTORY:  Family History   Problem Relation Age of Onset    Asthma Paternal Grandfather     Obesity Paternal Grandfather     Colon Cancer Paternal Grandfather     Cancer Paternal Grandfather     Other Cancer Paternal Grandfather     Diabetes Father     Hypertension Father     Osteoporosis Father     Cerebrovascular Disease Father     Thyroid Disease Father     Obesity Father     Other Cancer Father     Migraines Father     Hypothyroidism Father     Spine Problems Father     Diabetes Paternal Grandmother     Osteoporosis Paternal Grandmother     Thyroid Disease Paternal Grandmother     Hypothyroidism Paternal Grandmother     Low Back Problems Paternal Grandmother     Spine Problems Paternal Grandmother     Osteoporosis Mother     Cerebrovascular Disease Mother     Thyroid Disease Mother     Deep Vein Thrombosis Mother     Hypothyroidism Mother     Spine Problems Mother     Low Back Problems Mother      () Other              Again, thank you for allowing me to participate in the care of your patient.      Sincerely,    Suly Voss MD

## 2023-11-30 NOTE — TELEPHONE ENCOUNTER
[facility] Call Center    Phone Message    May a detailed message be left on voicemail: yes     Reason for Call: Other: [facility] is requesting Dr. Voss MIGEL number, please call [facility]ine at [phone]     Action Taken: Message routed to:  [facility] ([facility] Neurology    Travel Screening: Not Applicable

## 2023-11-30 NOTE — TELEPHONE ENCOUNTER
Spoke with [facility] - pharmacy requires MIGEL # for this prescription. Message sent to Dr. Voss and Dr. Maya for review & re-ordering gabapentin for patient.

## 2023-11-30 NOTE — NURSING NOTE
Chief Complaint   Patient presents with    New Patient       Vitals were taken and medications were reconciled.    Gracie Patrick, Technician  1:15 PM  November 30, 2023

## 2023-12-04 ENCOUNTER — IMMUNIZATION (OUTPATIENT)
Dept: FAMILY MEDICINE | Facility: CLINIC | Age: 73
End: 2023-12-04
Payer: MEDICARE

## 2023-12-04 ENCOUNTER — TELEPHONE (OUTPATIENT)
Dept: NEUROLOGY | Facility: CLINIC | Age: 73
End: 2023-12-04

## 2023-12-04 ENCOUNTER — LAB (OUTPATIENT)
Dept: LAB | Facility: CLINIC | Age: 73
End: 2023-12-04
Payer: MEDICARE

## 2023-12-04 DIAGNOSIS — G25.81 RESTLESS LEG SYNDROME: ICD-10-CM

## 2023-12-04 PROCEDURE — G0008 ADMIN INFLUENZA VIRUS VAC: HCPCS

## 2023-12-04 PROCEDURE — 91320 SARSCV2 VAC 30MCG TRS-SUC IM: CPT

## 2023-12-04 PROCEDURE — 82728 ASSAY OF FERRITIN: CPT

## 2023-12-04 PROCEDURE — 90480 ADMN SARSCOV2 VAC 1/ONLY CMP: CPT

## 2023-12-04 PROCEDURE — 90662 IIV NO PRSV INCREASED AG IM: CPT

## 2023-12-04 PROCEDURE — 36415 COLL VENOUS BLD VENIPUNCTURE: CPT

## 2023-12-04 NOTE — TELEPHONE ENCOUNTER
Patient Contacted to schedule the following:    Appointment type: Return Neurology  Provider: B[place]  Return date: Around 2/29/2024  Specialty phone number: [phone]  Additional appointment(s) needed: N/A  Additional Notes: N/A    Spoke with patient. She requested to schedule an earlier follow-up with Dr. Voss to discuss MRI results. Scheduled on 1/18/2024.    Helio Scruggs on 12/4/2023 at 2:37 PM

## 2023-12-05 LAB — FERRITIN SERPL-MCNC: 30 NG/ML (ref 11–328)

## 2023-12-23 ENCOUNTER — HOSPITAL ENCOUNTER (OUTPATIENT)
Dept: MRI IMAGING | Facility: CLINIC | Age: 73
Discharge: HOME | End: 2023-12-23
Attending: STUDENT IN AN ORGANIZED HEALTH CARE EDUCATION/TRAINING PROGRAM
Payer: MEDICARE

## 2023-12-23 DIAGNOSIS — G57.02 NEUROPATHY, SCIATIC, LEFT: ICD-10-CM

## 2023-12-23 DIAGNOSIS — M54.2 NECK PAIN: ICD-10-CM

## 2023-12-23 DIAGNOSIS — R93.0 ABNORMAL MRI OF HEAD: ICD-10-CM

## 2023-12-23 PROCEDURE — G1010 CDSM STANSON: HCPCS

## 2023-12-23 PROCEDURE — 70553 MRI BRAIN STEM W/O & W/DYE: CPT | Mod: MG

## 2023-12-23 PROCEDURE — 255N000002 HC RX 255 OP 636: Mod: JZ | Performed by: STUDENT IN AN ORGANIZED HEALTH CARE EDUCATION/TRAINING PROGRAM

## 2023-12-23 PROCEDURE — A9585 GADOBUTROL INJECTION: HCPCS | Mod: JZ | Performed by: STUDENT IN AN ORGANIZED HEALTH CARE EDUCATION/TRAINING PROGRAM

## 2023-12-23 RX ORDER — GADOBUTROL 604.72 MG/ML
7.5 INJECTION INTRAVENOUS ONCE
Status: COMPLETED | OUTPATIENT
Start: 2023-12-23 | End: 2023-12-23

## 2023-12-23 RX ADMIN — GADOBUTROL 7.5 ML: 604.72 INJECTION INTRAVENOUS at 09:43

## 2024-01-07 ENCOUNTER — HEALTH MAINTENANCE LETTER (OUTPATIENT)
Age: 74
End: 2024-01-07

## 2024-01-18 ENCOUNTER — OFFICE VISIT (OUTPATIENT)
Dept: NEUROLOGY | Facility: CLINIC | Age: 74
End: 2024-01-18
Payer: MEDICARE

## 2024-01-18 VITALS
WEIGHT: 170 LBS | HEIGHT: 60 IN | OXYGEN SATURATION: 96 % | DIASTOLIC BLOOD PRESSURE: 74 MMHG | SYSTOLIC BLOOD PRESSURE: 133 MMHG | HEART RATE: 74 BPM | BODY MASS INDEX: 33.38 KG/M2

## 2024-01-18 DIAGNOSIS — M48.02 CERVICAL STENOSIS OF SPINAL CANAL: Primary | ICD-10-CM

## 2024-01-18 DIAGNOSIS — M54.50 BILATERAL LOW BACK PAIN WITHOUT SCIATICA, UNSPECIFIED CHRONICITY: ICD-10-CM

## 2024-01-18 PROCEDURE — 99214 OFFICE O/P EST MOD 30 MIN: CPT | Mod: GC | Performed by: STUDENT IN AN ORGANIZED HEALTH CARE EDUCATION/TRAINING PROGRAM

## 2024-01-18 RX ORDER — LIDOCAINE 40 MG/G
CREAM TOPICAL
Qty: 15 G | Refills: 3 | Status: SHIPPED | OUTPATIENT
Start: 2024-01-18

## 2024-01-18 RX ORDER — FERROUS SULFATE 325(65) MG
325 TABLET ORAL
COMMUNITY

## 2024-01-18 ASSESSMENT — PAIN SCALES - GENERAL: PAINLEVEL: NO PAIN (0)

## 2024-01-18 NOTE — LETTER
2024       RE: Janey Bo  [address] [facility] [place] [address]       Dear Colleague,    Thank you for referring your patient, Janey Bo, to the [facility] NEUROLOGY CLINIC [place] at [facility]. Please see a copy of my visit note below.      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  Neurologic exam actually interesting and not consistent with a peripheral nerve lesion we would suspect after surgery in the thigh. She had brisk reflexes suggesting an upper motor neuron sign therefore large workup was completed including MRI of the brain and cervical spine were completed.  MRI cervical spine showed advanced multilevel spondylosis, worst at C5-6, with moderate to severe canal stenosis and cord compression.  Today patient with continued symptoms of neck pain and intermittent sensory symptoms in bilateral upper extremities concerning for symptomatic cervical spine disease.  Discussed referral to neurosurgery for further evaluation of possible surgical intervention.    Also placed referral for physical therapy for lower back pain, as well as discussed topical creams for symptomatic relief.    Plan:  - Referral to neurosurgery for evaluation of cervical spine stenosis  - Referral to PT for lower back pain  - Lidocaine cream for lower back pain  - Follow up in 6 months    Patient was seen and discussed with Dr. Jones.    Suly Voss MD  Neurology  Resident, PGY4  [facility] Department of Neurology    _________________________________________    HPI:  Janey Bo is a  73 year old female who presents today for follow-up visit to discuss MRI results.    Today, patient reports symptoms are largely unchanged.  She is reporting having continued daily neck pain with limited range of motion in her neck.  Also with intermittent numbness and tingling in bilateral hands that spreads up to the level of her elbows.  She does have intermittent radicular symptoms from her neck.  She also details having pretty significant mid to low back pain, without radicular symptoms into her legs.  Most of the symptoms are unchanged since I saw patient last.  No new neurologic symptoms.      NEUROLOGIC EXAMINATION:  Vitals: /74 (BP Location: Left arm, Patient Position: Sitting, Cuff Size: Adult Regular)   Pulse 74   Ht 1.524 m (5')   Wt 77.1 kg (170 lb)   SpO2 96%   BMI 33.20 kg/m      Neurologic Exam:  Mental status: Patient is oriented to person, place and time and able to provide a detailed account of history of illness. Attention and fund of knowledge are normal. Speech is fluent; comprehension, repetition and naming are normal.     Cranial nerves: Pupils are round and react normally to light and accommodation. Visual fields are full and extraocular movements are normal. Facial strength and sensation are normal. Hearing is normal to conversation. Palate rises symmetrically and there is no dysarthria. Tongue protrusion is normal.     Motor/Strength: Strength is 5/5 throughout upper and lower extremity symptoms, aside from right hip flexor 4+/5.  Mild intention tremor.  Normal tone throughout upper and lower extremities.     Sensation: Sensation slightly diminished to pinprick on the lateral aspect of left foot otherwise normal throughout upper and lower extremities.  Vibratory sensation unremarkable.     Reflexes: Brisk biceps, brachioradialis,  patella, achilles.  Negative Stone.  No clonus at ankles.     Coordination: Finger-to-nose are symmetric and normal bilaterally. Rapid alternating movements are symmetric in the extremities. No pronator drift.     Gait: Gait is narrow based and steady, though slightly hunched over.  Unable to walk in tandem or on heels or toes.      INVESTIGATIONS:   MR CERVICAL SPINE W/O CONTRAST    IMPRESSION:  1.  Advanced multilevel spondylosis, worst at C5-C6 where there is moderate to severe canal stenosis with cord compression.  2.  Multilevel ventral cord flattening without abnormal cord signal.  3.  Foraminal stenoses, as above.    MR BRAIN W/O and W CONTRAST   1.  No acute intracranial process.  2.  Generalized brain atrophy and presumed microvascular ischemic changes as detailed above.    MR LUMBAR SPINE W/O CONTRAST   IMPRESSION:  1. Transitional anatomy at the lumbosacral junction, as described.  2. Multilevel degenerative changes, as described.  3. Moderate central spinal canal stenosis at L3-L4. Milder degrees of spinal canal narrowing elsewhere.  4. Varying degrees of multilevel degenerative neural foraminal stenosis, as described.  5. Mild multilevel degenerative spondylolisthesis.    Attestation signed by Galo Jones DO at 1/22/2024  7:54 AM:  I saw and evaluated the patient on 1/18/2024 and agree with the findings and the plan of care as documented in the resident's note.      The patient has hyperreflexia throughout the right and left side, with imaging consistent with moderate to severe cervical spinal canal narrowing.  I suspect her cervical stenosis is symptomatic in terms of her neck pain, but at this time she fortunately doesn't have clear signs of weakness or sensory loss.  She was agreeable to meeting with our neurosurgery team for consideration of intervention if it is thought to be deemed appropriate.    Total time 30 minutes over half of which was with patient and family counseling or  coordinating care      Again, thank you for allowing me to participate in the care of your patient.      Sincerely,    Suly Voss MD

## 2024-01-18 NOTE — NURSING NOTE
Chief Complaint   Patient presents with    RECHECK       Vitals were taken and medications were reconciled.    Gracie Patrick, Technician  4:26 PM  January 18, 2024

## 2024-02-02 NOTE — TELEPHONE ENCOUNTER
Action 2/2/24 MV 11am   Action Taken 1) Called pt to see if she completed the EMG; pt stated she did not yet. It is scheduled in March. Pt then stated she had a MRI in 2001 at [facility]. She would like that image available at the appointment. Writer explained to patient that those images may have been purged but I will try.  2) Called [facility], images older than 2009 have already been purged.  3) Called pt back and let her know. Pt expressed gratitude and thanks       SPINE PATIENTS - NEW PROTOCOL PREVISIT    RECORDS RECEIVED FROM: internal   REASON FOR VISIT: Cervical stenosis of spinal canal [M48.02]cervical spine stenosis    Date of Appt: 2/13/24   NOTES (FOR ALL VISITS) STATUS DETAILS   OFFICE NOTE from referring provider Internal Dr Suly Voss @ [facility] Neurology:  1/18/24 11/30/23   MEDICATION LIST Internal    IMAGING  (FOR ALL VISITS)     MRI (HEAD, NECK, SPINE) Internal [facility]:  MRI Cervical Spine 12/23/23

## 2024-02-05 ENCOUNTER — TELEPHONE (OUTPATIENT)
Dept: NEUROLOGY | Facility: CLINIC | Age: 74
End: 2024-02-05
Payer: MEDICARE

## 2024-02-05 NOTE — TELEPHONE ENCOUNTER
Patient Contacted for the patient to call back and schedule the following:    Appointment type: New Neurology  Provider: Any general neuro provider  Return date: Around 7/18/2024  Specialty phone number: [phone]  Additional appointment(s) needed: N/A  Additional Notes: Transfer of care. Dr. Voss is graduating.     Spoke with patient, she requested a call back towards the end of next week, after her appt with Dr. Anna Scruggs on 2/5/2024 at 5:31 PM

## 2024-02-13 ENCOUNTER — PRE VISIT (OUTPATIENT)
Dept: NEUROSURGERY | Facility: CLINIC | Age: 74
End: 2024-02-13

## 2024-02-13 ENCOUNTER — TELEPHONE (OUTPATIENT)
Dept: NEUROLOGY | Facility: CLINIC | Age: 74
End: 2024-02-13

## 2024-02-13 ENCOUNTER — OFFICE VISIT (OUTPATIENT)
Dept: NEUROSURGERY | Facility: CLINIC | Age: 74
End: 2024-02-13
Attending: STUDENT IN AN ORGANIZED HEALTH CARE EDUCATION/TRAINING PROGRAM
Payer: MEDICARE

## 2024-02-13 VITALS
OXYGEN SATURATION: 97 % | HEART RATE: 75 BPM | SYSTOLIC BLOOD PRESSURE: 132 MMHG | HEIGHT: 60 IN | DIASTOLIC BLOOD PRESSURE: 84 MMHG | WEIGHT: 170 LBS | BODY MASS INDEX: 33.38 KG/M2 | RESPIRATION RATE: 16 BRPM

## 2024-02-13 DIAGNOSIS — M48.02 CERVICAL STENOSIS OF SPINAL CANAL: ICD-10-CM

## 2024-02-13 PROCEDURE — 99204 OFFICE O/P NEW MOD 45 MIN: CPT | Mod: GC | Performed by: NEUROLOGICAL SURGERY

## 2024-02-13 RX ORDER — METHYLPREDNISOLONE 4 MG
TABLET, DOSE PACK ORAL
Qty: 21 TABLET | Refills: 0 | Status: SHIPPED | OUTPATIENT
Start: 2024-02-13 | End: 2024-03-14

## 2024-02-13 ASSESSMENT — PAIN SCALES - GENERAL: PAINLEVEL: SEVERE PAIN (6)

## 2024-02-13 NOTE — PROGRESS NOTES
Neurosurgery Clinic Note    Chief Complaint:     History of Present Illness:  It was a pleasure to evaluate Janey Bo in clinic today at the kind referral of   Suly Voss MD  [address] [place] [address].    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 The patient was seen in Neurology clinic and referred here for hyperreflexia and cervical stenosis.    Patient describes having neck pain on and off for 35 years, but pain RIGHT side that had been exacerbated since [place]. This pain occurs from her occiput to trapezius that feels like muscle pulling/tightness as well as sharp pain. The pain is rated 6 out of 10 and worse when lifting objects. She also reports difficulty with balance and keeping her feet under her.    She reports dropping things and feels she has lost  strength in both her hands but on LEFT>RIGHT. She has a lot of trouble opening cans/jars and bottles of water, which has been progressively worse over last few years. Additionally, she also feels her hands start going to sleep when driving. Sometimes she feels her LEFT whole hand with pins and needle sensation, less so on RIGHT.     Finally, she has some shooting pain in LEFT lower calf with some numbness on lateral thigh and calf. This has been present for years on/off since the resection of the tumor on her sciatic nerve.    ALLERGIC TO SOME ADHESIVES, FLUORIDE, MORPHINE, PINEAPPLE AND BLACK WALNUTS       Conservative Treatment:  She has tried a Medrol dose pack, but for West Nile Encephalitis and Lyme disease in 2022    Home traction device helps with pain - [facility] ([facility])  Has not tried physical therapy for C-spine      Review of Systems   Constitutional: Negative for activity change, appetite change, chills, fatigue, fever and unexpected weight change.   HENT: Negative for trouble swallowing.    Eyes: Negative for visual disturbance.   Respiratory: + shortness of breath (associated with exercise)  Cardiovascular: Negative for leg swelling.  Gastrointestinal: Negative for abdominal pain, nausea and vomiting.   Endocrine: Negative for cold intolerance.  Genitourinary: Negative for incontinence, frequency and urgency.   Musculoskeletal: As described above  Skin: Negative for color change  Allergic/Immunologic: Negative for immunocompromised state.  Neurological: Negative for tremors, speech difficulty, headaches.   Hematological: Does not bruise/bleed easily.   Psychiatric/Behavioral: The patient is not nervous/anxious.     Past Medical History:   Diagnosis Date    Anxiety     Arrhythmia     Breast cancer (H)     Carpal tunnel syndrome     Chronic osteoarthritis     Depressive disorder     Diabetes (H) 2018    Edema     Fatigue     Gastro-oesophageal reflux disease     Heart murmur     Hyperlipaemia     Migraines     Osteoarthrosis     Palpitations     Pancreatic cancer (H)     Pancreatic disease     PONV (postoperative nausea and vomiting)     PVC's (premature ventricular contractions)     Scoliosis     Shortness of breath     Thyroid disease     Hypothyroidsim    Uncomplicated asthma     Exercise induced.        Past Surgical History:   Procedure Laterality Date    APPENDECTOMY      CHOLECYSTECTOMY      COLONOSCOPY      EXCISE MASS LOWER EXTREMITY Left 11/7/2014    Procedure: EXCISE MASS LOWER EXTREMITY;  Surgeon: John Reyes MD;  [facility]    EXCISE VILLALOBOS'S NEUROMA FOOT      EXCISE SOFT TISSUE TUMOR THIGH Left 1/20/2017    Procedure: EXCISE SOFT TISSUE TUMOR THIGH;  Surgeon: John Reyes MD;   [facility]    EXCISE SOFT TISSUE TUMOR THIGH Left 4/16/2021    Procedure: Excision of intraneural lipomatous tumor Left posterior thigh;  Surgeon: John Reyes MD;  [facility]    INJECT STEROID (LOCATION) Left 4/16/2021    Procedure: Left shoulder subacromial bursa steroid injection;  Surgeon: John Reyes MD;  [facility]    INNER EAR SURGERY      LUMPECTOMY BREAST      OOPHORECTOMY      partial hysterectomy      thyroidectomy      TONSILLECTOMY & ADENOIDECTOMY      WHIPPLE PROCEDURE      ZZ GASTROSCOPY,FL      ZZC HAND/FINGER SURGERY UNLISTED      ZZC STOMACH SURGERY PROCEDURE UNLISTED         Social History     Socioeconomic History    Marital status:      Spouse name: None    Number of children: None    Years of education: None    Highest education level: None   Tobacco Use    Smoking status: Former     Packs/day: 1.50     Years: 15.00     Additional pack years: 0.00     Total pack years: 22.50     Types: Cigarettes    Smokeless tobacco: Never    Tobacco comments:     quit 30 yrs ago   Substance and Sexual Activity    Alcohol use: No    Drug use: No    Sexual activity: Not Currently     Partners: Male       family history includes Asthma in her paternal grandfather; Cancer in her paternal grandfather; Cerebrovascular Disease in her father and mother; Colon Cancer in her paternal grandfather; Deep Vein Thrombosis in her mother; Diabetes in her father and paternal grandmother; Hypertension in her father; Hypothyroidism in her father, mother, and paternal grandmother; Low Back Problems in her mother and paternal grandmother; Migraines in her father; Obesity in her father and paternal grandfather; Osteoporosis in her father, mother, and paternal grandmother; Other Cancer in her father and paternal grandfather; Spine Problems in her father, mother, and paternal grandmother; Thyroid Disease in her father, mother, and paternal grandmother.       IMAGING   Imaging independently  reviewed.    Cervical spine with multilevel spondylosis worst at C5-C6 where there is moderate to severe canal stenosis with cord compression but without abnormal cord signal, appearing chronic.      Bone Density:  MR FEMUR LEFT WWO CONTRAST    Result Date: 1/4/2024  1. On the left, postsurgical changes of prior posterior thigh tumor resection. 2. No change in residual fatty infiltration along the course of the sciatic nerve. No new or progressive disease. 3. No change in chronic atrophy of the left-sided hamstrings musculature nor in the high-grade attenuation of the proximal tendon. 4. No left femoral marrow replacement process. Dictated by David Bloom MD @ 1/4/2024 9:18:44 AM (Electronically Signed)      MRI Lumbar spine w/o contrast  Result Date: 12/24/2023  EXAM: MR LUMBAR SPINE W/O CONTRAST LOCATION: [facility] DATE: 12/23/2023 INDICATION: leg pain, rule out nerve impingement COMPARISON: CT of the chest, abdomen and pelvis 01/08/2020.  IMPRESSION: 1. Transitional anatomy at the lumbosacral junction, as described. 2. Multilevel degenerative changes, as described. 3. Moderate central spinal canal stenosis at L3-L4. Milder degrees of spinal canal narrowing elsewhere. 4. Varying degrees of multilevel degenerative neural foraminal stenosis, as described. 5. Mild multilevel degenerative spondylolisthesis.      MRI Brain w & w/o contrast    Result Date: 12/24/2023  EXAM: MR BRAIN W/O and W CONTRAST LOCATION: [facility] DATE: 12/23/2023 INDICATION: Reported history of encephalitis, follow up imaging COMPARISON: None. CONTRAST: Gadavist 7.5 mL   IMPRESSION: Prior brain imaging not available to assess for interval change.  When available an addendum can be made. 1.  No acute intracranial process. 2.  Generalized brain atrophy and presumed microvascular ischemic changes as detailed above.    MRI Cervical spine w/o contrast    Result Date: 12/24/2023  EXAM: MR  CERVICAL SPINE W/O CONTRAST LOCATION: [facility] DATE: 12/23/2023 INDICATION: Neck pain   IMPRESSION: 1.  Advanced multilevel spondylosis, worst at C5-C6 where there is moderate to severe canal stenosis with cord compression. 2.  Multilevel ventral cord flattening without abnormal cord signal. 3.  Foraminal stenoses, as above.    Nicotine Usage:  No     Physical Exam   /84 (BP Location: Right arm, Patient Position: Sitting, Cuff Size: Adult Large)   Pulse 75   Resp 16   Ht 1.524 m (5')   Wt 77.1 kg (170 lb)   SpO2 97%   BMI 33.20 kg/m      Constitutional: Appears well-developed and well-nourished. Cooperative. No acute distress.   HENT:   Head: Normocephalic and atraumatic.   Eyes: Conjunctivae are normal.  Neck: Neck supple. No tracheal deviation present.  Cardiovascular: Normal rate and regular rhythm.    Pulmonary/Chest: Effort normal and breath sounds normal.  Abdominal: Exhibits no obvious distension.   Musculoskeletal: Able to move all extremities.  No involuntary motor movements. No C/T/L spine tenderness to palpation.    Cervical flexion-extension range of motion: WNL  Lumbar flexion/extension range of motion: WNL  Skin: Skin is warm, dry and intact.   Psychiatric: Normal mood and affect. Speech is normal and behavior is normal.    Neurological:  Alert, NAD, and oriented to person, place, and time.   No cranial nerve deficit   Gait: Shuffling gait, mildly restricted gait size    Strength (L/R)  5/5 Deltoid  5/5 Bicep  5/5 Wrist Extensor  5/5 Tricep  5/5 Finger Flexion  5/5 Finger Abduction  5/5 Handgrip    5/5 Hip Flexion  5/5 Knee Extension  4/5 Knee Flexion  5/5 Ankle Dorsiflexion  5/5 Extensor Hallucis Longus  5/5 Plantar Flexion  5/5 Foot Eversion  5/5 Foot Inversion    Reflexes (L/R)  3+/3+ Bicep  2+/2+ Brachioradialis  3+/3+ Patellar    No Spurling's  No Selena's   No ankle clonus    Sensation: Sensation grossly intact throughout       ASSESSMENT:  Janey FLOWERS  Betzy is a 73 year old female with PMH significant for thyroid cancer s/p thyroidectomy now postoperatively hypothyroidism, type 2 diabetes, hypertension, 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      PLAN:  Trial of conservative management with PM&R referral for addressing multi-level pain and paraesthesias        Presentation, imaging, and plan discussed with Dr. Charlotte Camacho in clinic.    MISTY WALKER MD  PGY1 Neurosurgery Intern  I have seen this patient with the resident and formulated a plan and agree with this note.  AMP

## 2024-02-13 NOTE — LETTER
2/13/2024      RE: Janey Bo  [address] [facility] [place] [id number]           Neurosurgery Clinic Note    Chief Complaint:     History of Present Illness:  It was a pleasure to evaluate Janey Bo in clinic today at the kind referral of   Suly Voss MD  [address] [place] [address].    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 The patient was seen in Neurology clinic and referred here for hyperreflexia and cervical stenosis.    Patient describes having neck pain on and off for 35 years, but pain RIGHT side that had been exacerbated since [place]. This pain occurs from her occiput to trapezius that feels like muscle pulling/tightness as well as sharp pain. The pain is rated 6 out of 10 and worse when lifting objects. She also reports difficulty with balance and keeping her feet under her.    She reports dropping things and feels she has lost  strength in both her hands but on LEFT>RIGHT. She has a lot of trouble opening cans/jars and bottles of water, which has been progressively worse over last few years. Additionally, she also feels her hands start going to sleep when driving. Sometimes she feels her LEFT whole hand with pins and needle sensation, less so on RIGHT.     Finally, she has some shooting pain in LEFT lower calf with some numbness on lateral thigh and calf. This has been present for years on/off since the resection of the tumor on her sciatic nerve.    ALLERGIC TO SOME ADHESIVES, FLUORIDE, MORPHINE, PINEAPPLE AND BLACK WALNUTS       Conservative  Treatment:  She has tried a Medrol dose pack, but for West Nile Encephalitis and Lyme disease in 2022   Home traction device helps with pain - [facility] ([facility])  Has not tried physical therapy for C-spine      Review of Systems   Constitutional: Negative for activity change, appetite change, chills, fatigue, fever and unexpected weight change.   HENT: Negative for trouble swallowing.    Eyes: Negative for visual disturbance.   Respiratory: + shortness of breath (associated with exercise)  Cardiovascular: Negative for leg swelling.  Gastrointestinal: Negative for abdominal pain, nausea and vomiting.   Endocrine: Negative for cold intolerance.  Genitourinary: Negative for incontinence, frequency and urgency.   Musculoskeletal: As described above  Skin: Negative for color change  Allergic/Immunologic: Negative for immunocompromised state.  Neurological: Negative for tremors, speech difficulty, headaches.   Hematological: Does not bruise/bleed easily.   Psychiatric/Behavioral: The patient is not nervous/anxious.     Past Medical History:   Diagnosis Date    Anxiety     Arrhythmia     Breast cancer (H)     Carpal tunnel syndrome     Chronic osteoarthritis     Depressive disorder     Diabetes (H) 2018    Edema     Fatigue     Gastro-oesophageal reflux disease     Heart murmur     Hyperlipaemia     Migraines     Osteoarthrosis     Palpitations     Pancreatic cancer (H)     Pancreatic disease     PONV (postoperative nausea and vomiting)     PVC's (premature ventricular contractions)     Scoliosis     Shortness of breath     Thyroid disease     Hypothyroidsim    Uncomplicated asthma     Exercise induced.        Past Surgical History:   Procedure Laterality Date    APPENDECTOMY      CHOLECYSTECTOMY      COLONOSCOPY      EXCISE MASS LOWER EXTREMITY Left 11/7/2014    Procedure: EXCISE MASS LOWER EXTREMITY;  Surgeon: John Reyes MD;  [facility]    EXCISE VILLALOBOS'S NEUROMA FOOT      EXCISE SOFT TISSUE  TUMOR THIGH Left 1/20/2017    Procedure: EXCISE SOFT TISSUE TUMOR THIGH;  Surgeon: John Reyes MD;  [facility]    EXCISE SOFT TISSUE TUMOR THIGH Left 4/16/2021    Procedure: Excision of intraneural lipomatous tumor Left posterior thigh;  Surgeon: John Reyes MD;  [facility]    INJECT STEROID (LOCATION) Left 4/16/2021    Procedure: Left shoulder subacromial bursa steroid injection;  Surgeon: John Reyes MD;  [facility]    INNER EAR SURGERY      LUMPECTOMY BREAST      OOPHORECTOMY      partial hysterectomy      thyroidectomy      TONSILLECTOMY & ADENOIDECTOMY      WHIPPLE PROCEDURE      ZZ GASTROSCOPY,FL      ZZC HAND/FINGER SURGERY UNLISTED      ZZC STOMACH SURGERY PROCEDURE UNLISTED         Social History     Socioeconomic History    Marital status:      Spouse name: None    Number of children: None    Years of education: None    Highest education level: None   Tobacco Use    Smoking status: Former     Packs/day: 1.50     Years: 15.00     Additional pack years: 0.00     Total pack years: 22.50     Types: Cigarettes    Smokeless tobacco: Never    Tobacco comments:     quit 30 yrs ago   Substance and Sexual Activity    Alcohol use: No    Drug use: No    Sexual activity: Not Currently     Partners: Male       family history includes Asthma in her paternal grandfather; Cancer in her paternal grandfather; Cerebrovascular Disease in her father and mother; Colon Cancer in her paternal grandfather; Deep Vein Thrombosis in her mother; Diabetes in her father and paternal grandmother; Hypertension in her father; Hypothyroidism in her father, mother, and paternal grandmother; Low Back Problems in her mother and paternal grandmother; Migraines in her father; Obesity in her father and paternal grandfather; Osteoporosis in her father, mother, and paternal grandmother; Other Cancer in her father and paternal grandfather; Spine Problems in her father, mother, and paternal grandmother; Thyroid  Disease in her father, mother, and paternal grandmother.       IMAGING   Imaging independently reviewed.    Cervical spine with multilevel spondylosis worst at C5-C6 where there is moderate to severe canal stenosis with cord compression but without abnormal cord signal, appearing chronic.      Bone Density:  MR FEMUR LEFT WWO CONTRAST    Result Date: 1/4/2024  1. On the left, postsurgical changes of prior posterior thigh tumor resection. 2. No change in residual fatty infiltration along the course of the sciatic nerve. No new or progressive disease. 3. No change in chronic atrophy of the left-sided hamstrings musculature nor in the high-grade attenuation of the proximal tendon. 4. No left femoral marrow replacement process. Dictated by David Bloom MD @ 1/4/2024 9:18:44 AM (Electronically Signed)      MRI Lumbar spine w/o contrast  Result Date: 12/24/2023  EXAM: MR LUMBAR SPINE W/O CONTRAST LOCATION: [facility] DATE: 12/23/2023 INDICATION: leg pain, rule out nerve impingement COMPARISON: CT of the chest, abdomen and pelvis 01/08/2020.  IMPRESSION: 1. Transitional anatomy at the lumbosacral junction, as described. 2. Multilevel degenerative changes, as described. 3. Moderate central spinal canal stenosis at L3-L4. Milder degrees of spinal canal narrowing elsewhere. 4. Varying degrees of multilevel degenerative neural foraminal stenosis, as described. 5. Mild multilevel degenerative spondylolisthesis.      MRI Brain w & w/o contrast    Result Date: 12/24/2023  EXAM: MR BRAIN W/O and W CONTRAST LOCATION: [facility] DATE: 12/23/2023 INDICATION: Reported history of encephalitis, follow up imaging COMPARISON: None. CONTRAST: Gadavist 7.5 mL   IMPRESSION: Prior brain imaging not available to assess for interval change.  When available an addendum can be made. 1.  No acute intracranial process. 2.  Generalized brain atrophy and presumed microvascular ischemic changes  as detailed above.    MRI Cervical spine w/o contrast    Result Date: 12/24/2023  EXAM: MR CERVICAL SPINE W/O CONTRAST LOCATION: [facility] DATE: 12/23/2023 INDICATION: Neck pain   IMPRESSION: 1.  Advanced multilevel spondylosis, worst at C5-C6 where there is moderate to severe canal stenosis with cord compression. 2.  Multilevel ventral cord flattening without abnormal cord signal. 3.  Foraminal stenoses, as above.    Nicotine Usage:  No     Physical Exam   /84 (BP Location: Right arm, Patient Position: Sitting, Cuff Size: Adult Large)   Pulse 75   Resp 16   Ht 1.524 m (5')   Wt 77.1 kg (170 lb)   SpO2 97%   BMI 33.20 kg/m      Constitutional: Appears well-developed and well-nourished. Cooperative. No acute distress.   HENT:   Head: Normocephalic and atraumatic.   Eyes: Conjunctivae are normal.  Neck: Neck supple. No tracheal deviation present.  Cardiovascular: Normal rate and regular rhythm.    Pulmonary/Chest: Effort normal and breath sounds normal.  Abdominal: Exhibits no obvious distension.   Musculoskeletal: Able to move all extremities.  No involuntary motor movements. No C/T/L spine tenderness to palpation.    Cervical flexion-extension range of motion: WNL  Lumbar flexion/extension range of motion: WNL  Skin: Skin is warm, dry and intact.   Psychiatric: Normal mood and affect. Speech is normal and behavior is normal.    Neurological:  Alert, NAD, and oriented to person, place, and time.   No cranial nerve deficit   Gait: Shuffling gait, mildly restricted gait size    Strength (L/R)  5/5 Deltoid  5/5 Bicep  5/5 Wrist Extensor  5/5 Tricep  5/5 Finger Flexion  5/5 Finger Abduction  5/5 Handgrip    5/5 Hip Flexion  5/5 Knee Extension  4/5 Knee Flexion  5/5 Ankle Dorsiflexion  5/5 Extensor Hallucis Longus  5/5 Plantar Flexion  5/5 Foot Eversion  5/5 Foot Inversion    Reflexes (L/R)  3+/3+ Bicep  2+/2+ Brachioradialis  3+/3+ Patellar    No Spurling's  No Selena's   No ankle  clonus    Sensation: Sensation grossly intact throughout       ASSESSMENT:  Janey Bo is a 73 year old female with PMH significant for thyroid cancer s/p thyroidectomy now postoperatively hypothyroidism, type 2 diabetes, hypertension, 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      PLAN:  Trial of conservative management with PM&R referral for addressing multi-level pain and paraesthesias        Presentation, imaging, and plan discussed with Dr. Charlotte Camacho in clinic.    MISTY WALKER MD  PGY1 Neurosurgery Intern      Charlotte Camacho MD

## 2024-02-13 NOTE — NURSING NOTE
Chief Complaint   Patient presents with    New Patient     Here for consult, confirmed with patient     Brenda Cerrato

## 2024-02-13 NOTE — TELEPHONE ENCOUNTER
Called waitlist patient and offered an appointment with Dr. Hope on this date 2/14/2024 & time 7am at this location [place]     Please note there is no guarantee this appointment will be available as it is first come first serve.    Spoke with patient, she declined appt. Requested to stay on the wait list for any future openings.     Helio Scruggs on 2/13/2024 at 2:35 PM

## 2024-02-13 NOTE — PATIENT INSTRUCTIONS
Dr Camacho entered orders for a Medrol Dose Isidro.  We will sent on to your pharmacy.  Take as directed on packaging.    Complete the currently scheduled EMG.    In addition, Dr Camacho sent a referral to Physical Medicine to help address your neck pain.    Dr Camacho did offer a surgery.  Please consider that as a future option as well.

## 2024-02-14 ENCOUNTER — TELEPHONE (OUTPATIENT)
Dept: NEUROLOGY | Facility: CLINIC | Age: 74
End: 2024-02-14
Payer: MEDICARE

## 2024-02-14 NOTE — TELEPHONE ENCOUNTER
Pt requests call back after Dr. Camacho appt. First attempt no answer. Will give second attempt this week    Nereida Corado on 2/14/2024 at 3:51 PM

## 2024-02-16 ENCOUNTER — TELEPHONE (OUTPATIENT)
Dept: NEUROLOGY | Facility: CLINIC | Age: 74
End: 2024-02-16
Payer: MEDICARE

## 2024-02-16 NOTE — TELEPHONE ENCOUNTER
Patient Contacted to schedule the following:    Appointment type: New Neurology  Provider: Any general neuro provider  Return date: Around 7/18/2024  Specialty phone number: [phone]  Additional appointment(s) needed: N/A  Additional Notes: Transfer of care. Dr. Voss is graduating. Please disregard previous note requesting to sched with Dr. Voss.    Helio Scruggs on 2/16/2024 at 1:33 PM

## 2024-02-21 NOTE — PROGRESS NOTES
ASSESSMENT: Janey Bo is a 73 year old female with past medical history significant for migraine, metatarsalgia on the left, gastritis, C. difficile, hypothyroidism, history of thyroid cancer, type 2 diabetes mellitus, hypertension, adhesive capsulitis of the right shoulder, biceps tendinitis on the right, hallux limitus left, hammertoe left, liposarcoma left thigh, tendinopathy right rotator cuff, osteopenia, anemia, West Nile encephalitis and Lyme disease in 2022, history of carpal tunnel release who presents today for new patient evaluation of chronic right neck pain with radiation into the right upper extremity.  My review of an MRI cervical spine shows multilevel cervical spondylosis most pronounced at C5-6 where there is moderate to severe spinal canal stenosis.  There is also multilevel facet arthropathy with multilevel high-grade foraminal stenosis.  Suspect pain is multifactorial.  She appears to have primarily cervical facet mediated pain.  She has facet arthropathy throughout the cervical spine including at C1-C2 on the right.  She has secondary myofascial pain with a trigger point in the right upper to previous muscle.  Pain radiating into the right upper extremity may be radicular in nature.  Most closely follows a C5 pattern.  There is severe bilateral foraminal stenosis at C4-5.  She may also have some occipital neuralgia.  - Patient saw Dr. Camacho from neurosurgery February 13, 2024.  She recommended a trial of conservative treatment.        PLAN:  A shared decision making model was used.  The patient's values and choices were respected.  The following represents what was discussed and decided upon by the physician assistant and the patient.      1.  DIAGNOSTIC TESTS:  - I reviewed the MRI cervical spine.  - No additional diagnostic test were ordered.    2.  PHYSICAL THERAPY:  Discussed the importance of core strengthening, ROM, stretching exercises with the patient and how each of these  entities is important in decreasing pain.  Explained to the patient that the purpose of physical therapy is to teach the patient a home exercise program.  These exercises need to be performed every day in order to decrease pain and prevent future occurrences of pain.   I entered a referral to physical therapy.    3.  MEDICATIONS:    - Patient recently completed a Medrol Dosepak with partial relief of her pain.  She request a repeat Medrol Dosepak which I prescribed.  I would not recommend any additional oral steroids.  - Patient will continue gabapentin 600 mg at bedtime  - Patient needs tizanidine 4 mg at bedtime  - Patient takes Aleve once or twice daily  - Patient applies lidocaine as needed    4.  INTERVENTIONS:  - Offer the patient right C2, C3, C4 medial branch blocks as a workup toward radiofrequency ablation.  I chose to start with this procedure because pain is most pronounced in the right upper cervical region.  She states that her pain is 70% neck pain and only 30% arm pain.  Patient indicated she would like to proceed and an order was placed.  - If axial neck pain improved but she continues to have arm symptoms I would recommend a right C4-5 transforaminal epidural steroid injection.  - If she continues to have myofascial pain I would recommend a right upper trapezius muscle trigger point injection.  - We could consider a right occipital nerve block if occipital pain persist.  - We could also consider a right C2 medial branch peripheral nerve stimulator.  - We could also consider a right C1-C2 facet joint injection.      5.  PATIENT EDUCATION: Patient is in agreement with the above plan.  All questions were answered.    6.  REFERRALS: If patient fails to improve with conservative treatments I would recommend a referral back to see Dr. Camacho with neurosurgery.    7.  FOLLOW-UP:   Patient will return to the clinic for her right C2, C3, C4 medial branch blocks.  If she has questions or concerns in the  meantime, she should not hesitate to call.        SUBJECTIVE:  Janey Bo  Is a 73 year old female who presents today in consultation at request of Dr. Camacho for new patient evaluation of neck pain with right upper extremity pain.  Patient reports that he has had chronic neck pain for more than 20 years.  He used to be manageable with a home traction device and activity restrictions but pain has been getting more severe, especially since Christmas 2023.  She states that since then she has had constant pain.  She recently completed a Medrol Dosepak which helped somewhat but was not as effective as it has been previously.  She would like to repeat the Medrol Dosepak to see if she gets more relief.    Patient complains of right-sided neck pain.  Pain is most pronounced at the right upper cervical region.  Pain radiates up into the occipital region and causes a headache.  Pain radiates into the right upper trapezius muscle, right shoulder, and about shelter down the right upper arm.  She describes the pain as sharp, aching, and shooting.  Pain is aggravated with reaching overhead and bending down.  Pain is alleviated temporarily with applying ice and heat.  Patient reports that her pain is 70% neck pain and 30% arm pain.  Patient reports that she has numbness and tingling in both hands and throughout the right upper extremity.  She does have a history of bilateral carpal tunnel release.  She feels weakness in her arms, especially with overhead lifting.  Denies loss of bowel or bladder control.  Denies fevers.    Treatment to date:  - Physical therapy years ago, does stretches several times per day  - Home traction which was helpful but her device broke a couple of years ago  - No chiropractic treatment  - No spine surgeries  - No spine injections  - Medrol Dosepak helpful  - Gabapentin 6 and milligrams at bedtime to sleep  - Tizanidine 4 mg at bedtime  - Aleve 1 tab once or twice daily somewhat helpful  -  Lidocaine roll-on difficult to apply    Current Outpatient Medications   Medication    albuterol (PROAIR HFA/PROVENTIL HFA/VENTOLIN HFA) 108 (90 Base) MCG/ACT inhaler    amylase-lipase-protease (CREON 24) 69175-68237 units CPEP per EC capsule    aspirin 81 MG tablet    biotin 5 MG CAPS    buPROPion (WELLBUTRIN SR) 200 MG 12 hr tablet    buPROPion (WELLBUTRIN) 100 MG tablet    butalbital-aspirin-caffeine (FIORINAL) -40 MG capsule    calcium carbonate 750 MG CHEW    Calcium-Magnesium-Vitamin D (CALCIUM MAGNESIUM PO)    cholecalciferol (VITAMIN D3) 10 mcg (400 units) TABS tablet    diltiazem ER (DILT-XR) 180 MG 24 hr capsule    DilTIAZem HCl Coated Beads (DILTIAZEM CD PO)    escitalopram (LEXAPRO) 10 MG tablet    ferrous sulfate (FEROSUL) 325 (65 Fe) MG tablet    fluorouracil (EFUDEX) 5 % external cream    gabapentin (NEURONTIN) 300 MG capsule    gabapentin (NEURONTIN) 300 MG capsule    gabapentin (NEURONTIN) 300 MG capsule    hydrOXYzine (ATARAX) 10 MG tablet    hydrOXYzine (ATARAX) 10 MG tablet    ibuprofen (ADVIL/MOTRIN) 200 MG capsule    levothyroxine (SYNTHROID/LEVOTHROID) 150 MCG tablet    levothyroxine (SYNTHROID/LEVOTHROID) 150 MCG tablet    lidocaine (LMX4) 4 % external cream    methylphenidate (RITALIN) 10 MG tablet    methylphenidate (RITALIN) 20 MG tablet    methylPREDNISolone (MEDROL DOSEPAK) 4 MG tablet therapy pack    metroNIDAZOLE (METROGEL) 0.75 % external gel    NAPROXEN SODIUM PO    potassium chloride ER (K-TAB/KLOR-CON) 10 MEQ CR tablet    potassium chloride ER (KLOR-CON M) 20 MEQ CR tablet    Probiotic Product (ALIGN) CAPS    raloxifene (EVISTA) 60 MG tablet    sucralfate (CARAFATE) 1 GM/10ML suspension    tiZANidine (ZANAFLEX) 2 MG tablet    tiZANidine (ZANAFLEX) 4 MG tablet    VITAMIN E BLEND PO     No current facility-administered medications for this visit.       Allergies   Allergen Reactions    [place] Pollen Allergy Skin Test Anaphylaxis    Nuts Anaphylaxis     thickened throat     Pineapple Anaphylaxis and Other (See Comments)     Thickening in throat  thickened throat      [place] Flavor Other (See Comments)     thickened throat  thickened throat    Liquid Adhesive Other (See Comments)     Tears my skin off    Morphine Itching and Other (See Comments)     Itchiness      Morphine Hcl Itching       Past Medical History:   Diagnosis Date    Anxiety     Arrhythmia     Breast cancer (H)     Carpal tunnel syndrome     Chronic osteoarthritis     Depressive disorder     Diabetes (H) 2018    Edema     Fatigue     Gastro-oesophageal reflux disease     Heart murmur     Hyperlipaemia     Migraines     Osteoarthrosis     Palpitations     Pancreatic cancer (H)     Pancreatic disease     PONV (postoperative nausea and vomiting)     PVC's (premature ventricular contractions)     Scoliosis     Shortness of breath     Thyroid disease     Hypothyroidsim    Uncomplicated asthma     Exercise induced.         Patient Active Problem List   Diagnosis    Hypothyroidism    Thyroid cancer (H)    Abnormal glucose    Soft tissue mass    Benign lipomatous neoplasm of skin and subcutaneous tissue of trunk    Anemia    Gastritis    Encounter for other preprocedural examination    Tachycardia    Tremor    Non morbid obesity due to excess calories    Clostridium difficile diarrhea    Essential hypertension    Malignant neoplasm of thyroid gland (H)    Complications of medical care    Migraine    Other premature beats    Palpitations    Prediabetes    Atypical lipoma of soft tissue    Adhesive capsulitis of right shoulder    Biceps tendinitis of right upper extremity    Hallux limitus of left foot    Hammertoe of left foot    Impingement syndrome of right shoulder    Tendinopathy of right rotator cuff    Liposarcoma of left thigh (H)    Metatarsalgia of left foot    Osteopenia    Preop examination    Primary osteoarthritis, right shoulder    Type 2 diabetes mellitus without complication, without long-term current use of  insulin (H)       Past Surgical History:   Procedure Laterality Date    APPENDECTOMY      CHOLECYSTECTOMY      COLONOSCOPY      EXCISE MASS LOWER EXTREMITY Left 11/7/2014    Procedure: EXCISE MASS LOWER EXTREMITY;  Surgeon: John Reyes MD;  Location: [facility]    EXCISE VILLALOBOS'S NEUROMA FOOT      EXCISE SOFT TISSUE TUMOR THIGH Left 1/20/2017    Procedure: EXCISE SOFT TISSUE TUMOR THIGH;  Surgeon: John Reyes MD;  Location: [facility]    EXCISE SOFT TISSUE TUMOR THIGH Left 4/16/2021    Procedure: Excision of intraneural lipomatous tumor Left posterior thigh;  Surgeon: John Reyes MD;  Location: [facility]    INJECT STEROID (LOCATION) Left 4/16/2021    Procedure: Left shoulder subacromial bursa steroid injection;  Surgeon: John Reyes MD;  Location: [facility]    INNER EAR SURGERY      LUMPECTOMY BREAST      OOPHORECTOMY      partial hysterectomy      thyroidectomy      TONSILLECTOMY & ADENOIDECTOMY      WHIPPLE PROCEDURE      ZZ GASTROSCOPY,FL      ZZC HAND/FINGER SURGERY UNLISTED      ZZC STOMACH SURGERY PROCEDURE UNLISTED         Family History   Problem Relation Age of Onset    Asthma Paternal Grandfather     Obesity Paternal Grandfather     Colon Cancer Paternal Grandfather     Cancer Paternal Grandfather     Other Cancer Paternal Grandfather     Diabetes Father     Hypertension Father     Osteoporosis Father     Cerebrovascular Disease Father     Thyroid Disease Father     Obesity Father     Other Cancer Father     Migraines Father     Hypothyroidism Father     Spine Problems Father     Diabetes Paternal Grandmother     Osteoporosis Paternal Grandmother     Thyroid Disease Paternal Grandmother     Hypothyroidism Paternal Grandmother     Low Back Problems Paternal Grandmother     Spine Problems Paternal Grandmother     Osteoporosis Mother     Cerebrovascular Disease Mother     Thyroid Disease Mother     Deep Vein Thrombosis Mother     Hypothyroidism Mother     Spine Problems Mother     Low Back Problems Mother       () Other        Social history: Patient is .  She is retired.  Denies tobacco, alcohol, recreational drug use.    ROS: Positive for headache, hoarseness, ringing in ears, chest pain, palpitations, feet/leg swelling, shortness of breath, abdominal pain, reflux, joint pain, muscle pain, muscle fatigue, sciatica, itching, imbalance, falls, dizziness, insomnia, excessive tiredness. Otherwise 13 systems reviewed are negative.  Please see the patient's intake questionnaire from today for details.      OBJECTIVE:  PHYSICAL EXAMINATION:  CONSTITUTIONAL:  Vital signs as above.  No acute distress.  The patient is well nourished and well groomed.  PSYCHIATRIC:  The patient is awake, alert, oriented to person, place, time and answering questions appropriately with clear speech.    HEENT:  Normocephalic, atraumatic.  Sclera clear.    SKIN:  Skin over the face, bilateral upper extremities, and neck is clean, dry, intact without rashes.  LYMPH NODES:  No palpable or tender anterior/posterior cervical, submandibular, or supraclavicular lymph nodes.    MUSCLE STRENGTH:  5/5 strength for the bilateral shoulder abductors, elbow flexors/extensors, wrist extensors, finger flexors/abductors.  NEURO:  CN III-XII are grossly intact.  2-3+ symmetric biceps, brachioradialis, triceps reflexes bilaterally.  Sensation to light touch intact bilateral upper extremities throughout.  Negative Stone's bilaterally.    VASCULAR:  2/4 radial pulses bilaterally.  Warm upper limbs bilaterally.  Capillary refill in the upper extremities is less than 1 second.  MUSCULOSKELETAL: Tender to palpation right upper cervical facets.  Tender to palpation right occipital nerve.  Tender to palpation with hypertonicity right upper trapezius muscle.  Palpation of the right upper trapezius muscle reproduces right neck pain.  Cervical range of motion is mildly restricted with flexion, moderately restricted with extension, moderately restricted lateral  rotation right, mild to moderately restricted lateral rotation left.    RESULTS:    I reviewed the MRI cervical spine from [facility] dated December 23, 2023.  This shows advanced multilevel spondylosis.  At C2-3 there is mild left foraminal stenosis.  At C3-4 there is mild spinal canal stenosis with severe right greater than left foraminal stenosis.  At C4-5 there is mild to moderate spinal canal stenosis with severe bilateral foraminal stenosis.  At C5-6 there is moderate to severe spinal canal stenosis with severe left and moderate right foraminal stenosis.  At C6-7 there is moderate bilateral foraminal stenosis.    I reviewed the MRI lumbar spine from [facility] dated December 23, 2023.  This shows transitional lumbosacral anatomy with a partially sacralized L5.  There is grade 1 anterolisthesis L3-4.  There is multilevel disc degeneration and facet arthropathy.  At L1-L2 there is mild spinal canal stenosis with mild to moderate right foraminal stenosis.  At L2-3 there is mild spinal canal stenosis with mild to moderate right and mild left foraminal stenosis.  At L3-4 there is moderate central canal stenosis with mild bilateral foraminal stenosis.  At L4-5 there is moderate right and mild to moderate left foraminal stenosis.

## 2024-02-26 ENCOUNTER — OFFICE VISIT (OUTPATIENT)
Dept: PHYSICAL MEDICINE AND REHAB | Facility: CLINIC | Age: 74
End: 2024-02-26
Payer: MEDICARE

## 2024-02-26 VITALS
DIASTOLIC BLOOD PRESSURE: 70 MMHG | HEART RATE: 74 BPM | BODY MASS INDEX: 35.46 KG/M2 | HEIGHT: 60 IN | WEIGHT: 180.6 LBS | SYSTOLIC BLOOD PRESSURE: 146 MMHG

## 2024-02-26 DIAGNOSIS — M47.812 FACET ARTHROPATHY, CERVICAL: ICD-10-CM

## 2024-02-26 DIAGNOSIS — M79.18 MYOFASCIAL PAIN: ICD-10-CM

## 2024-02-26 DIAGNOSIS — M54.12 CERVICAL RADICULAR PAIN: Primary | ICD-10-CM

## 2024-02-26 PROCEDURE — 99204 OFFICE O/P NEW MOD 45 MIN: CPT | Performed by: PHYSICIAN ASSISTANT

## 2024-02-26 RX ORDER — METHYLPREDNISOLONE 4 MG
TABLET, DOSE PACK ORAL
Qty: 21 TABLET | Refills: 0 | Status: SHIPPED | OUTPATIENT
Start: 2024-02-26 | End: 2024-03-14

## 2024-02-26 ASSESSMENT — PAIN SCALES - GENERAL: PAINLEVEL: MODERATE PAIN (5)

## 2024-02-26 NOTE — PATIENT INSTRUCTIONS
Medial Branch Block (MBB) TEST    This is a TEST injection to find out what is causing your pain.  Specifically, this test is trying to identify whichjoint in your back or neck is causing pain.   This injection will NOT provide any long term pain relief because it is just a TEST.  Steroid is NOT used for this injection.   Steroid is what gives longterm pain relief.  Since there is no steroid used, there is no long term pain relief.    You need a  for the procedure.    Because we want to determine what is causing your pain, we need you to do a few things on the day of your Medial Branch Block :     Do not take any pain medications on the day of your Medial Branch Block/Test.  Try to do activities that make our pain increase.  We want you tohave a high level of pain on the day of the test.  This makes it easier to know the results of the test.  Other information:    You may eatand drink on the day of the test.  You should take your other medications (just not pain medications) at the times you usually take them  You will be asked to fill out a pain diary for 6 hours after thetest.    AFTER THE TEST:    Please do not take any pain medications for 6 hours after the TEST.  After the pain diary is filled out, youmay resume taking your pain medications.  Please return the pain [facility] to the [facility] the next day or as soon as you are able.  You will be contacted with what will happen next after the physicianreviews your pain diary.  You may be asked to come in for a follow-up appointment to discuss other treatment options  It may be recommended that you have an injection to help treatyour pain.  You may need to come in for a second set of Medial Branch Blocks (TESTS).        Please call the spine center at [phone] if you have any questions.     An order for physicaltherapy has been provided today.  Someone will call you to schedule physical therapy or you can call [phone] to schedule physical therapy.   It will be very important for you to do your physical therapy exercises on aregular basis to decrease your pain and prevent future flares of pain.

## 2024-02-26 NOTE — LETTER
2/26/2024         RE: Janey Bo  [address] [facility] [address]        Dear Colleague,    Thank you for referring your patient, Janey Bo, to the [facility]. Please see a copy of my visit note below.    ASSESSMENT: Janey Bo is a 73 year old female with past medical history significant for migraine, metatarsalgia on the left, gastritis, C. difficile, hypothyroidism, history of thyroid cancer, type 2 diabetes mellitus, hypertension, adhesive capsulitis of the right shoulder, biceps tendinitis on the right, hallux limitus left, hammertoe left, liposarcoma left thigh, tendinopathy right rotator cuff, osteopenia, anemia, West Nile encephalitis and Lyme disease in 2022, history of carpal tunnel release who presents today for new patient evaluation of chronic right neck pain with radiation into the right upper extremity.  My review of an MRI cervical spine shows multilevel cervical spondylosis most pronounced at C5-6 where there is moderate to severe spinal canal stenosis.  There is also multilevel facet arthropathy with multilevel high-grade foraminal stenosis.  Suspect pain is multifactorial.  She appears to have primarily cervical facet mediated pain.  She has facet arthropathy throughout the cervical spine including at C1-C2 on the right.  She has secondary myofascial pain with a trigger point in the right upper to previous muscle.  Pain radiating into the right upper extremity may be radicular in nature.  Most closely follows a C5 pattern.  There is severe bilateral foraminal stenosis at C4-5.  She may also have some occipital neuralgia.  - Patient saw Dr. Camacho from neurosurgery February 13, 2024.  She recommended a trial of conservative treatment.        PLAN:  A shared decision making model was used.  The patient's values and choices were respected.  The following represents what was discussed and decided upon by the physician  assistant and the patient.      1.  DIAGNOSTIC TESTS:  - I reviewed the MRI cervical spine.  - No additional diagnostic test were ordered.    2.  PHYSICAL THERAPY:  Discussed the importance of core strengthening, ROM, stretching exercises with the patient and how each of these entities is important in decreasing pain.  Explained to the patient that the purpose of physical therapy is to teach the patient a home exercise program.  These exercises need to be performed every day in order to decrease pain and prevent future occurrences of pain.   I entered a referral to physical therapy.    3.  MEDICATIONS:    - Patient recently completed a Medrol Dosepak with partial relief of her pain.  She request a repeat Medrol Dosepak which I prescribed.  I would not recommend any additional oral steroids.  - Patient will continue gabapentin 600 mg at bedtime  - Patient needs tizanidine 4 mg at bedtime  - Patient takes Aleve once or twice daily  - Patient applies lidocaine as needed    4.  INTERVENTIONS:  - Offer the patient right C2, C3, C4 medial branch blocks as a workup toward radiofrequency ablation.  I chose to start with this procedure because pain is most pronounced in the right upper cervical region.  She states that her pain is 70% neck pain and only 30% arm pain.  Patient indicated she would like to proceed and an order was placed.  - If axial neck pain improved but she continues to have arm symptoms I would recommend a right C4-5 transforaminal epidural steroid injection.  - If she continues to have myofascial pain I would recommend a right upper trapezius muscle trigger point injection.  - We could consider a right occipital nerve block if occipital pain persist.  - We could also consider a right C2 medial branch peripheral nerve stimulator.  - We could also consider a right C1-C2 facet joint injection.      5.  PATIENT EDUCATION: Patient is in agreement with the above plan.  All questions were answered.    6.   REFERRALS: If patient fails to improve with conservative treatments I would recommend a referral back to see Dr. Camacho with neurosurgery.    7.  FOLLOW-UP:   Patient will return to the clinic for her right C2, C3, C4 medial branch blocks.  If she has questions or concerns in the meantime, she should not hesitate to call.        SUBJECTIVE:  Janey Bo  Is a 73 year old female who presents today in consultation at request of Dr. Camacho for new patient evaluation of neck pain with right upper extremity pain.  Patient reports that he has had chronic neck pain for more than 20 years.  He used to be manageable with a home traction device and activity restrictions but pain has been getting more severe, especially since Christmas 2023.  She states that since then she has had constant pain.  She recently completed a Medrol Dosepak which helped somewhat but was not as effective as it has been previously.  She would like to repeat the Medrol Dosepak to see if she gets more relief.    Patient complains of right-sided neck pain.  Pain is most pronounced at the right upper cervical region.  Pain radiates up into the occipital region and causes a headache.  Pain radiates into the right upper trapezius muscle, right shoulder, and about nursing home down the right upper arm.  She describes the pain as sharp, aching, and shooting.  Pain is aggravated with reaching overhead and bending down.  Pain is alleviated temporarily with applying ice and heat.  Patient reports that her pain is 70% neck pain and 30% arm pain.  Patient reports that she has numbness and tingling in both hands and throughout the right upper extremity.  She does have a history of bilateral carpal tunnel release.  She feels weakness in her arms, especially with overhead lifting.  Denies loss of bowel or bladder control.  Denies fevers.    Treatment to date:  - Physical therapy years ago, does stretches several times per day  - Home traction which was helpful but  her device broke a couple of years ago  - No chiropractic treatment  - No spine surgeries  - No spine injections  - Medrol Dosepak helpful  - Gabapentin 6 and milligrams at bedtime to sleep  - Tizanidine 4 mg at bedtime  - Aleve 1 tab once or twice daily somewhat helpful  - Lidocaine roll-on difficult to apply    Current Outpatient Medications   Medication     albuterol (PROAIR HFA/PROVENTIL HFA/VENTOLIN HFA) 108 (90 Base) MCG/ACT inhaler     amylase-lipase-protease (CREON 24) 81526-27860 units CPEP per EC capsule     aspirin 81 MG tablet     biotin 5 MG CAPS     buPROPion (WELLBUTRIN SR) 200 MG 12 hr tablet     buPROPion (WELLBUTRIN) 100 MG tablet     butalbital-aspirin-caffeine (FIORINAL) -40 MG capsule     calcium carbonate 750 MG CHEW     Calcium-Magnesium-Vitamin D (CALCIUM MAGNESIUM PO)     cholecalciferol (VITAMIN D3) 10 mcg (400 units) TABS tablet     diltiazem ER (DILT-XR) 180 MG 24 hr capsule     DilTIAZem HCl Coated Beads (DILTIAZEM CD PO)     escitalopram (LEXAPRO) 10 MG tablet     ferrous sulfate (FEROSUL) 325 (65 Fe) MG tablet     fluorouracil (EFUDEX) 5 % external cream     gabapentin (NEURONTIN) 300 MG capsule     gabapentin (NEURONTIN) 300 MG capsule     gabapentin (NEURONTIN) 300 MG capsule     hydrOXYzine (ATARAX) 10 MG tablet     hydrOXYzine (ATARAX) 10 MG tablet     ibuprofen (ADVIL/MOTRIN) 200 MG capsule     levothyroxine (SYNTHROID/LEVOTHROID) 150 MCG tablet     levothyroxine (SYNTHROID/LEVOTHROID) 150 MCG tablet     lidocaine (LMX4) 4 % external cream     methylphenidate (RITALIN) 10 MG tablet     methylphenidate (RITALIN) 20 MG tablet     methylPREDNISolone (MEDROL DOSEPAK) 4 MG tablet therapy pack     metroNIDAZOLE (METROGEL) 0.75 % external gel     NAPROXEN SODIUM PO     potassium chloride ER (K-TAB/KLOR-CON) 10 MEQ CR tablet     potassium chloride ER (KLOR-CON M) 20 MEQ CR tablet     Probiotic Product (ALIGN) CAPS     raloxifene (EVISTA) 60 MG tablet     sucralfate (CARAFATE) 1  GM/10ML suspension     tiZANidine (ZANAFLEX) 2 MG tablet     tiZANidine (ZANAFLEX) 4 MG tablet     VITAMIN E BLEND PO     No current facility-administered medications for this visit.       Allergies   Allergen Reactions     Black Spruce Pine Pollen Allergy Skin Test Anaphylaxis     Nuts Anaphylaxis     thickened throat     Pineapple Anaphylaxis and Other (See Comments)     Thickening in throat  thickened throat       Black Spruce Pine Flavor Other (See Comments)     thickened throat  thickened throat     Liquid Adhesive Other (See Comments)     Tears my skin off     Morphine Itching and Other (See Comments)     Itchiness       Morphine Hcl Itching       Past Medical History:   Diagnosis Date     Anxiety      Arrhythmia      Breast cancer (H)      Carpal tunnel syndrome      Chronic osteoarthritis      Depressive disorder      Diabetes (H) 2018     Edema      Fatigue      Gastro-oesophageal reflux disease      Heart murmur      Hyperlipaemia      Migraines      Osteoarthrosis      Palpitations      Pancreatic cancer (H)      Pancreatic disease      PONV (postoperative nausea and vomiting)      PVC's (premature ventricular contractions)      Scoliosis      Shortness of breath      Thyroid disease     Hypothyroidsim     Uncomplicated asthma     Exercise induced.         Patient Active Problem List   Diagnosis     Hypothyroidism     Thyroid cancer (H)     Abnormal glucose     Soft tissue mass     Benign lipomatous neoplasm of skin and subcutaneous tissue of trunk     Anemia     Gastritis     Encounter for other preprocedural examination     Tachycardia     Tremor     Non morbid obesity due to excess calories     Clostridium difficile diarrhea     Essential hypertension     Malignant neoplasm of thyroid gland (H)     Complications of medical care     Migraine     Other premature beats     Palpitations     Prediabetes     Atypical lipoma of soft tissue     Adhesive capsulitis of right shoulder     Biceps tendinitis of right upper  extremity     Hallux limitus of left foot     Hammertoe of left foot     Impingement syndrome of right shoulder     Tendinopathy of right rotator cuff     Liposarcoma of left thigh (H)     Metatarsalgia of left foot     Osteopenia     Preop examination     Primary osteoarthritis, right shoulder     Type 2 diabetes mellitus without complication, without long-term current use of insulin (H)       Past Surgical History:   Procedure Laterality Date     APPENDECTOMY       CHOLECYSTECTOMY       COLONOSCOPY       EXCISE MASS LOWER EXTREMITY Left 11/7/2014    Procedure: EXCISE MASS LOWER EXTREMITY;  Surgeon: John Reyes MD;  Location: [facility] OR     EXCISE VILLALOBOS'S NEUROMA FOOT       EXCISE SOFT TISSUE TUMOR THIGH Left 1/20/2017    Procedure: EXCISE SOFT TISSUE TUMOR THIGH;  Surgeon: John Reyes MD;  Location: [facility] OR     EXCISE SOFT TISSUE TUMOR THIGH Left 4/16/2021    Procedure: Excision of intraneural lipomatous tumor Left posterior thigh;  Surgeon: John Reyes MD;  Location: [facility] OR     INJECT STEROID (LOCATION) Left 4/16/2021    Procedure: Left shoulder subacromial bursa steroid injection;  Surgeon: oJhn Reyes MD;  Location: [facility] OR     INNER EAR SURGERY       LUMPECTOMY BREAST       OOPHORECTOMY       partial hysterectomy       thyroidectomy       TONSILLECTOMY & ADENOIDECTOMY       WHIPPLE PROCEDURE       ZZ GASTROSCOPY,FL       ZZC HAND/FINGER SURGERY UNLISTED       ZZC STOMACH SURGERY PROCEDURE UNLISTED         Family History   Problem Relation Age of Onset     Asthma Paternal Grandfather      Obesity Paternal Grandfather      Colon Cancer Paternal Grandfather      Cancer Paternal Grandfather      Other Cancer Paternal Grandfather      Diabetes Father      Hypertension Father      Osteoporosis Father      Cerebrovascular Disease Father      Thyroid Disease Father      Obesity Father      Other Cancer Father      Migraines Father      Hypothyroidism Father      Spine Problems Father      Diabetes Paternal  Grandmother      Osteoporosis Paternal Grandmother      Thyroid Disease Paternal Grandmother      Hypothyroidism Paternal Grandmother      Low Back Problems Paternal Grandmother      Spine Problems Paternal Grandmother      Osteoporosis Mother      Cerebrovascular Disease Mother      Thyroid Disease Mother      Deep Vein Thrombosis Mother      Hypothyroidism Mother      Spine Problems Mother      Low Back Problems Mother       () Other        Social history: Patient is .  She is retired.  Denies tobacco, alcohol, recreational drug use.    ROS: Positive for headache, hoarseness, ringing in ears, chest pain, palpitations, feet/leg swelling, shortness of breath, abdominal pain, reflux, joint pain, muscle pain, muscle fatigue, sciatica, itching, imbalance, falls, dizziness, insomnia, excessive tiredness. Otherwise 13 systems reviewed are negative.  Please see the patient's intake questionnaire from today for details.      OBJECTIVE:  PHYSICAL EXAMINATION:  CONSTITUTIONAL:  Vital signs as above.  No acute distress.  The patient is well nourished and well groomed.  PSYCHIATRIC:  The patient is awake, alert, oriented to person, place, time and answering questions appropriately with clear speech.    HEENT:  Normocephalic, atraumatic.  Sclera clear.    SKIN:  Skin over the face, bilateral upper extremities, and neck is clean, dry, intact without rashes.  LYMPH NODES:  No palpable or tender anterior/posterior cervical, submandibular, or supraclavicular lymph nodes.    MUSCLE STRENGTH:  5/5 strength for the bilateral shoulder abductors, elbow flexors/extensors, wrist extensors, finger flexors/abductors.  NEURO:  CN III-XII are grossly intact.  2-3+ symmetric biceps, brachioradialis, triceps reflexes bilaterally.  Sensation to light touch intact bilateral upper extremities throughout.  Negative Stone's bilaterally.    VASCULAR:  2/4 radial pulses bilaterally.  Warm upper limbs bilaterally.  Capillary refill in the  upper extremities is less than 1 second.  MUSCULOSKELETAL: Tender to palpation right upper cervical facets.  Tender to palpation right occipital nerve.  Tender to palpation with hypertonicity right upper trapezius muscle.  Palpation of the right upper trapezius muscle reproduces right neck pain.  Cervical range of motion is mildly restricted with flexion, moderately restricted with extension, moderately restricted lateral rotation right, mild to moderately restricted lateral rotation left.    RESULTS:    I reviewed the MRI cervical spine from [facility] dated December 23, 2023.  This shows advanced multilevel spondylosis.  At C2-3 there is mild left foraminal stenosis.  At C3-4 there is mild spinal canal stenosis with severe right greater than left foraminal stenosis.  At C4-5 there is mild to moderate spinal canal stenosis with severe bilateral foraminal stenosis.  At C5-6 there is moderate to severe spinal canal stenosis with severe left and moderate right foraminal stenosis.  At C6-7 there is moderate bilateral foraminal stenosis.    I reviewed the MRI lumbar spine from [facility] dated December 23, 2023.  This shows transitional lumbosacral anatomy with a partially sacralized L5.  There is grade 1 anterolisthesis L3-4.  There is multilevel disc degeneration and facet arthropathy.  At L1-L2 there is mild spinal canal stenosis with mild to moderate right foraminal stenosis.  At L2-3 there is mild spinal canal stenosis with mild to moderate right and mild left foraminal stenosis.  At L3-4 there is moderate central canal stenosis with mild bilateral foraminal stenosis.  At L4-5 there is moderate right and mild to moderate left foraminal stenosis.      Again, thank you for allowing me to participate in the care of your patient.        Sincerely,        Kimberlyn Hicks PA-C

## 2024-03-12 ENCOUNTER — OFFICE VISIT (OUTPATIENT)
Dept: NEUROLOGY | Facility: CLINIC | Age: 74
End: 2024-03-12
Payer: MEDICARE

## 2024-03-12 DIAGNOSIS — G57.02 NEUROPATHY, SCIATIC, LEFT: Primary | ICD-10-CM

## 2024-03-12 DIAGNOSIS — M54.50 BILATERAL LOW BACK PAIN WITHOUT SCIATICA, UNSPECIFIED CHRONICITY: ICD-10-CM

## 2024-03-12 PROCEDURE — 95910 NRV CNDJ TEST 7-8 STUDIES: CPT | Performed by: STUDENT IN AN ORGANIZED HEALTH CARE EDUCATION/TRAINING PROGRAM

## 2024-03-12 PROCEDURE — 95886 MUSC TEST DONE W/N TEST COMP: CPT | Performed by: STUDENT IN AN ORGANIZED HEALTH CARE EDUCATION/TRAINING PROGRAM

## 2024-03-12 NOTE — PROGRESS NOTES
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 Per orthopedic surgeon's note, \"her tumor was excised from within her sciatic nerve. The tumor had infiltrated and was entwined within the nerve fascicles, and given the atypical, low-grade nature of this tumor, the sciatic nerve was preserved at the expense of sustaining positive margins.\" She also reports occasional radiating pain from the right buttock to the posterior side of right leg that is much milder when compared to the left side.  Focused neurological exam showed mild weakness in the left hamstring and gastrocnemius muscles.  This study was performed to assess for mononeuropathy of the left lower limb versus lumbosacral radiculopathy.    Techniques:  Motor and sensory conduction studies were done with surface recording electrodes.  EMG was done with a concentric needle electrode.     Results:  Nerve conduction studies showed absent left sural sensory nerve action potential.  Right sural nerve action potential was normal.  Bilateral superficial fibular sensory nerve action potentials were present and symmetric.  Left tibial compound muscle action potential amplitude was moderately reduced.  Right tibial and bilateral fibular compound muscle action potentials were normal.  There were no conduction block or temporal dispersion.  Bilateral tibial F-wave latencies " were within normal limits.    Needle EMG of the left lower limb showed reduced recruitment of high amplitude, long duration, and sometimes polyphasic motor unit potentials in several left sciatic innervated muscles; findings were more severe in the tibial- more than peroneal- innervated muscles.  Fibrillation potentials were noted in the left medial gastrocnemius and flexor digitorum longus muscles.    Interpretation:  This is an abnormal study.  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 There is no compelling electrophysiologic evidence of a superimposed left lumbosacral radiculopathy in the current study.     Terence Anderson MD  Department of Neurology        Nerve Conduction Studies  Motor Sites      Latency Amplitude Neg. Amp Diff Segment Distance Velocity Neg. Dur Neg Area Diff Temperature Comment   Site (ms) Norm (mV) Norm (%)  cm m/s Norm (ms) (%) ( C)    Left Fibular (EDB) Motor   Ankle 4.8  < 6.0 3.6  > 2.0  Ankle-EDB 8   5.1  29.8    Bel Fib Head 10.7 - 3.2 - -11 Bel Fib Head-Ankle 28.5 48  > 38 6.1 -2 29.9    Pop Fossa 12.9 - 3.0 - -6 Pop Fossa-Bel Fib Head 11.4 52  > 38 6.2 -4 30.1    Right Fibular (EDB) Motor   Ankle 4.6  < 6.0 3.2  > 2.0  Ankle-EDB 8   5.9  30.8    Left Tibial (AHB) Motor   Ankle 4.4  < 6.5 2.6  > 5.0  Ankle-AHB 8   3.9  30.9    Knee 12.1 - 2.2 - -15 Knee-Ankle 37.5 49  > 38 6.3 14 30.8    Right Tibial (AHB) Motor   Ankle 4.3  < 6.5 13.5  > 5.0  Ankle-AHB 8   4.4  30.8      F-Wave Sites      Min F-Lat Max-Min F-Lat Mean F-Lat   Site (ms) (ms) (ms)   Left Tibial F-Wave   Ankle 47.0 26.8 -   Right Tibial F-Wave   Ankle 49.9 26.3 -     Sensory Sites      Onset Lat Peak Lat Amp (O-P) Amp (P-P) Segment Distance Velocity Temperature Comment   Site ms (ms)  V Norm ( V)  cm m/s Norm ( C)    Left Superficial Fibular Sensory   14 cm-Ankle 2.8 3.6 6  > 3 8 14 cm-Ankle 12.5 45  > 38 31.6    Right  Superficial Fibular Sensory   14 cm-Ankle 2.6 3.3 9  > 3 11 14 cm-Ankle 12.5 48  > 38 31.5    Left Sural Sensory   Calf-Lat Mall NR NR NR  > 5 NR Calf-Lat Mall 14 NR  > 38 30.9    Right Sural Sensory   Calf-Lat Mall 2.9 3.6 7  > 5 11 Calf-Lat Mall 14 48  > 38 30.5        Electromyography     Side Muscle Ins Act Fibs/PSW Fasc HF Amp Dur Poly Recrt Int Pat   Left Tib Anterior Nml None Nml 0 1+ 1+ 0 Nikki Nml   Left Vastus Med Nml None Nml 0 Nml Nml 0 Nml Nml   Left Gastroc MH Nml 2+ Nml 0 2+ 2+ 2+ SevRed Nml   Left Gluteus Med Nml None Nml 0 Nml Nml 0 Nml Nml   Left Biceps Fem SH Nml None Nml 0 1+ 2+ 1+ ModRed Nml   Left Lumbar Paraspinals Nml None Nml 0 Nml Nml 0 Nml Nml   Left Fib Longus Nml None Nml 0 Nml 1+ 0 Nikki Nml   Left FDL Nml 1+ Nml 0 2+ 2+ 0 ModRed Nml     NCS Waveforms:    Motor                Sensory                F-Wave

## 2024-03-12 NOTE — LETTER
"3/12/2024       RE: Janey Bo  [address] [facility] [place] [address]       Dear Colleague,    Thank you for referring your patient, Janey Bo, to the [facility] [place] at [facility]. Please see a copy of my visit note below.                            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 Per orthopedic surgeon's note, \"her tumor was excised from within her sciatic nerve. The tumor had infiltrated and was entwined within the nerve fascicles, and given the atypical, low-grade nature of this tumor, the sciatic nerve was preserved at the expense of sustaining positive margins.\" She also reports occasional radiating pain from the right buttock to the posterior side of right leg that is much milder when compared to the left side.  Focused neurological exam showed mild weakness in the left hamstring and gastrocnemius muscles.  This study was performed to assess for mononeuropathy of the left lower limb versus lumbosacral radiculopathy.    Techniques:  Motor and sensory conduction studies were done with surface recording electrodes.  EMG was done with a concentric needle electrode.     Results:  Nerve conduction studies showed absent left sural sensory nerve action potential.  Right sural nerve action potential was normal.  Bilateral " superficial fibular sensory nerve action potentials were present and symmetric.  Left tibial compound muscle action potential amplitude was moderately reduced.  Right tibial and bilateral fibular compound muscle action potentials were normal.  There were no conduction block or temporal dispersion.  Bilateral tibial F-wave latencies were within normal limits.    Needle EMG of the left lower limb showed reduced recruitment of high amplitude, long duration, and sometimes polyphasic motor unit potentials in several left sciatic innervated muscles; findings were more severe in the tibial- more than peroneal- innervated muscles.  Fibrillation potentials were noted in the left medial gastrocnemius and flexor digitorum longus muscles.    Interpretation:  This is an abnormal study.  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 There is no compelling electrophysiologic evidence of a superimposed left lumbosacral radiculopathy in the current study.     Terence Anderson MD  Department of Neurology        Nerve Conduction Studies  Motor Sites      Latency Amplitude Neg. Amp Diff Segment Distance Velocity Neg. Dur Neg Area Diff Temperature Comment   Site (ms) Norm (mV) Norm (%)  cm m/s Norm (ms) (%) ( C)    Left Fibular (EDB) Motor   Ankle 4.8  < 6.0 3.6  > 2.0  Ankle-EDB 8   5.1  29.8    Bel Fib Head 10.7 - 3.2 - -11 Bel Fib Head-Ankle 28.5 48  > 38 6.1 -2 29.9    Pop Fossa 12.9 - 3.0 - -6 Pop Fossa-Bel Fib Head 11.4 52  > 38 6.2 -4 30.1    Right Fibular (EDB) Motor   Ankle 4.6  < 6.0 3.2  > 2.0  Ankle-EDB 8   5.9  30.8    Left Tibial (AHB) Motor   Ankle 4.4  < 6.5 2.6  > 5.0  Ankle-AHB 8   3.9  30.9    Knee 12.1 - 2.2 - -15 Knee-Ankle 37.5 49  > 38 6.3 14 30.8    Right Tibial (AHB) Motor   Ankle 4.3  < 6.5 13.5  > 5.0  Ankle-AHB 8   4.4  30.8      F-Wave Sites      Min F-Lat Max-Min F-Lat Mean F-Lat   Site (ms) (ms) (ms)   Left Tibial F-Wave    Ankle 47.0 26.8 -   Right Tibial F-Wave   Ankle 49.9 26.3 -     Sensory Sites      Onset Lat Peak Lat Amp (O-P) Amp (P-P) Segment Distance Velocity Temperature Comment   Site ms (ms)  V Norm ( V)  cm m/s Norm ( C)    Left Superficial Fibular Sensory   14 cm-Ankle 2.8 3.6 6  > 3 8 14 cm-Ankle 12.5 45  > 38 31.6    Right Superficial Fibular Sensory   14 cm-Ankle 2.6 3.3 9  > 3 11 14 cm-Ankle 12.5 48  > 38 31.5    Left Sural Sensory   Calf-Lat Mall NR NR NR  > 5 NR Calf-Lat Mall 14 NR  > 38 30.9    Right Sural Sensory   Calf-Lat Mall 2.9 3.6 7  > 5 11 Calf-Lat Mall 14 48  > 38 30.5        Electromyography     Side Muscle Ins Act Fibs/PSW Fasc HF Amp Dur Poly Recrt Int Pat   Left Tib Anterior Nml None Nml 0 1+ 1+ 0 Nikki Nml   Left Vastus Med Nml None Nml 0 Nml Nml 0 Nml Nml   Left Gastroc MH Nml 2+ Nml 0 2+ 2+ 2+ SevRed Nml   Left Gluteus Med Nml None Nml 0 Nml Nml 0 Nml Nml   Left Biceps Fem SH Nml None Nml 0 1+ 2+ 1+ ModRed Nml   Left Lumbar Paraspinals Nml None Nml 0 Nml Nml 0 Nml Nml   Left Fib Longus Nml None Nml 0 Nml 1+ 0 Nikki Nml   Left FDL Nml 1+ Nml 0 2+ 2+ 0 ModRed Nml     NCS Waveforms:    Motor                Sensory                F-Wave                     Again, thank you for allowing me to participate in the care of your patient.      Sincerely,    Terence Anderson MD

## 2024-03-14 ENCOUNTER — RADIOLOGY INJECTION OFFICE VISIT (OUTPATIENT)
Dept: PHYSICAL MEDICINE AND REHAB | Facility: CLINIC | Age: 74
End: 2024-03-14
Attending: PHYSICIAN ASSISTANT
Payer: MEDICARE

## 2024-03-14 VITALS
TEMPERATURE: 98.1 F | RESPIRATION RATE: 16 BRPM | DIASTOLIC BLOOD PRESSURE: 68 MMHG | OXYGEN SATURATION: 94 % | HEART RATE: 64 BPM | SYSTOLIC BLOOD PRESSURE: 114 MMHG

## 2024-03-14 DIAGNOSIS — M47.812 FACET ARTHROPATHY, CERVICAL: ICD-10-CM

## 2024-03-14 PROCEDURE — 64491 INJ PARAVERT F JNT C/T 2 LEV: CPT | Mod: RT | Performed by: PAIN MEDICINE

## 2024-03-14 PROCEDURE — 64490 INJ PARAVERT F JNT C/T 1 LEV: CPT | Mod: RT | Performed by: PAIN MEDICINE

## 2024-03-14 RX ORDER — BUPIVACAINE HYDROCHLORIDE 5 MG/ML
INJECTION, SOLUTION EPIDURAL; INTRACAUDAL
Status: COMPLETED | OUTPATIENT
Start: 2024-03-14 | End: 2024-03-14

## 2024-03-14 RX ORDER — LIDOCAINE HYDROCHLORIDE 10 MG/ML
INJECTION, SOLUTION EPIDURAL; INFILTRATION; INTRACAUDAL; PERINEURAL
Status: COMPLETED | OUTPATIENT
Start: 2024-03-14 | End: 2024-03-14

## 2024-03-14 RX ADMIN — LIDOCAINE HYDROCHLORIDE 3 ML: 10 INJECTION, SOLUTION EPIDURAL; INFILTRATION; INTRACAUDAL; PERINEURAL at 14:36

## 2024-03-14 RX ADMIN — BUPIVACAINE HYDROCHLORIDE 0.9 ML: 5 INJECTION, SOLUTION EPIDURAL; INTRACAUDAL at 14:38

## 2024-03-14 ASSESSMENT — PAIN SCALES - GENERAL
PAINLEVEL: NO PAIN (1)
PAINLEVEL: EXTREME PAIN (8)

## 2024-03-14 NOTE — PATIENT INSTRUCTIONS
Please complete your pain diary and return the diary to the Spine Center at your earliest convenience.  The Spine Center will contact you to schedule your next appointment after your pain diary is reviewed by your doctor.  Thank you.    DISCHARGE INSTRUCTIONS    During office hours (8:00 a.m.- 4:00 p.m.) questions or concerns may be answered  by calling Spine Center Navigation Nurses at  [phone].  Messages received after hours will be returned the following business day.      In the case of an emergency, please dial 911 or seek assistance at the nearest Emergency Room/Urgent Care facility.     All Patients:  You may experience an increase in your symptoms for the first 2 days, once the numbing medication wears off.    You may resume your regular medication, no pain medication until you have completed your diary    You may shower. No swimming, tub bath or hot tub for 24 hours; remove bandage after 4 hours    Continue your activities that can cause you pain to test the blocks.                         You should not drive for the next 3 to 5 hours (have someone drive you)           POSSIBLE PROCEDURE SIDE EFFECTS  -Call Spine Center if concerned-    Increased Pain  Increased numbness/tingling     Nausea/Vomiting  Bruising/bleeding at site (hematoma)             Swelling at site (edema) Headache  Difficulty walking  Infection        Fever greater than 100.5

## 2024-03-18 ENCOUNTER — TELEPHONE (OUTPATIENT)
Dept: PHYSICAL MEDICINE AND REHAB | Facility: CLINIC | Age: 74
End: 2024-03-18
Payer: MEDICARE

## 2024-03-18 DIAGNOSIS — M47.812 SPONDYLOSIS OF CERVICAL REGION WITHOUT MYELOPATHY OR RADICULOPATHY: Primary | ICD-10-CM

## 2024-03-18 NOTE — TELEPHONE ENCOUNTER
"----- Message from Nathan J Breyer, RN sent at 3/18/2024  8:20 AM CDT -----  Regarding: FW: MBB/RF Process  Per Kimberlyn on 3/18/24: \"Positive response.  Recommend confirmatory blocks.\"    ----- Message -----  From: Breyer, Nathan J, RN  Sent: 3/14/2024   1:32 PM CDT  To: [facility] Procedure Support Pool  Subject: MBB/RF Process                                   Patient had 1st diagnostic right C2, C3, C4 MBBs by Dr. Morales as ordered by Kimberlyn.  Awaiting return of pain diary at this time.        "

## 2024-03-28 ENCOUNTER — THERAPY VISIT (OUTPATIENT)
Dept: PHYSICAL THERAPY | Facility: REHABILITATION | Age: 74
End: 2024-03-28
Attending: PHYSICIAN ASSISTANT
Payer: MEDICARE

## 2024-03-28 DIAGNOSIS — M79.18 MYOFASCIAL PAIN: ICD-10-CM

## 2024-03-28 DIAGNOSIS — M54.12 CERVICAL RADICULAR PAIN: ICD-10-CM

## 2024-03-28 DIAGNOSIS — M47.812 FACET ARTHROPATHY, CERVICAL: ICD-10-CM

## 2024-03-28 PROCEDURE — 97110 THERAPEUTIC EXERCISES: CPT | Mod: GP | Performed by: PHYSICAL THERAPIST

## 2024-03-28 PROCEDURE — 97112 NEUROMUSCULAR REEDUCATION: CPT | Mod: GP | Performed by: PHYSICAL THERAPIST

## 2024-03-28 PROCEDURE — 97161 PT EVAL LOW COMPLEX 20 MIN: CPT | Mod: GP | Performed by: PHYSICAL THERAPIST

## 2024-03-28 NOTE — PROGRESS NOTES
PHYSICAL THERAPY EVALUATION  Type of Visit: Evaluation    See electronic medical record for Abuse and Falls Screening details.    Subjective       Presenting condition or subjective complaint: neck deterioration  Pt reports having some sort of neck pain about 30 years ago. No surgeries on her neck (multiple surgeries on her body in the past). Ever since pain started it has bothered to some extent - and pt has had h/o migraines.   Pt reports she has been taking gabapentin to try to help with pain.   Pt reports h/o 1 concussion.  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  Pt reports migraines at this point are only 1-2x/month, but has daily headaches with related neck pain for maybe the past couple of years.   Part of left hand goes numb on a regular basis on her 5th finger - has h/o carpal tunnel surgery both hands (L maybe 2 years ago, R maybe 30 years ago). Pt reports not remembering R hand having numbness recently.   Pt reports h/o neck jts used to get caught on each other.   Pt reports h/o injections in her shoulders - L shoulder has a torn rotator cuff surgery.  Pt tries to use heat on a regular basis - sometimes helps, hasn't tried cold.     Date of onset: 02/26/24    Relevant medical history: Anemia; Arthritis; Asthma; Bladder or bowel problems; Cancer; Chest pain; Depression; Diabetes; Dizziness; Menopause; Migraines or headaches; Neck injury; Osteoarthritis; Overweight; Pain at night or rest; Radiation treatment; Severe headaches; Sleep disorder like apnea; Smoking; Thyroid problems   Dates & types of surgery: too many to list here   u of m should already have this info    Prior diagnostic imaging/testing results: MRI     Prior therapy history for the same diagnosis, illness or injury:        Prior Level of Function  Transfers: Independent  Ambulation: Independent  ADL: Independent      Living Environment  Social support: With a significant other or  spouse   Type of home: House; 2-story; Basement   Stairs to enter the home: No       Ramp: No   Stairs inside the home: Yes   Is there a railing: Yes   Help at home: Home management tasks (cooking, cleaning); Medication and/or finances; Home and Yard maintenance tasks  Equipment owned:       Employment:      Hobbies/Interests:      Patient goals for therapy: hold my neck upright with only mild pain    Pain assessment:  Pain range can be 3-8.5/10      Objective   CERVICAL SPINE EVALUATION    POSTURE:  Forward head, rounded shoulders, increased thoracic kyphosis, significant stiffness with head positioning    ROM:  Cervical flex 35 degrees, ext 22 degrees, R rotation 35 degrees, L rotation 32 degrees, R SB 12 cm, L SB 11 cm    MYOTOMES:  Weakness B rotator cuff    DERMATOMES: WNL except reported transient numbness L 5th finger    Assessment & Plan   CLINICAL IMPRESSIONS  Medical Diagnosis: Cervical radicular pain (M54.12)    Facet arthropathy, cervical (M47.812)    Myofascial pain (M79.18)    Treatment Diagnosis: Cervical radicular pain (M54.12)    Facet arthropathy, cervical (M47.812)    Myofascial pain (M79.18)   Impression/Assessment: Patient is a 73 year old female with chronic neck pain complaints.  The following significant findings have been identified: Pain, Decreased ROM/flexibility, Decreased joint mobility, Decreased strength, Impaired muscle performance, Decreased activity tolerance, and Impaired posture. These impairments interfere with their ability to perform self care tasks, recreational activities, household chores, household mobility, and community mobility as compared to previous level of function.     Clinical Decision Making (Complexity):  Clinical Presentation: Stable/Uncomplicated  Clinical Presentation Rationale: based on medical and personal factors listed in PT evaluation  Clinical Decision Making (Complexity): Low complexity    PLAN OF CARE  Treatment Interventions:  Interventions: Manual  Therapy, Neuromuscular Re-education, Therapeutic Activity, Therapeutic Exercise, Self-Care/Home Management    Long Term Goals     PT Goal 1  Goal Description: Pt will be able to report not having daily headaches for improved quality of life in 90 days.  Target Date: 06/25/24  PT Goal 2  Goal Description: Pt will be able to perform ADLs with R arm reaching overhead without increase in neck  Target Date: 06/25/24      Frequency of Treatment: 1x/every 1-2 weeks  Duration of Treatment: 90 days      Risks and benefits of evaluation/treatment have been explained.   Patient/Family/caregiver agrees with Plan of Care.     Evaluation Time:     PT Eval, Low Complexity Minutes (23390): 20       Signing Clinician: Kiki Troncoso PT      [facility]                                                                                   OUTPATIENT PHYSICAL THERAPY      PLAN OF TREATMENT FOR OUTPATIENT REHABILITATION   Patient's Last Name, First Name, Janey Persaud YOB: 1950   Provider's Name   [facility]   Medical Record No.  [id number]     Onset Date: 02/26/24  Start of Care Date: 03/28/24     Medical Diagnosis:  Cervical radicular pain (M54.12)    Facet arthropathy, cervical (M47.812)    Myofascial pain (M79.18)      PT Treatment Diagnosis:  Cervical radicular pain (M54.12)    Facet arthropathy, cervical (M47.812)    Myofascial pain (M79.18) Plan of Treatment  Frequency/Duration: 1x/every 1-2 weeks/ 90 days    Certification date from 03/28/24 to 06/25/24         See note for plan of treatment details and functional goals     Kiki Troncoso PT                         I CERTIFY THE NEED FOR THESE SERVICES FURNISHED UNDER        THIS PLAN OF TREATMENT AND WHILE UNDER MY CARE     (Physician attestation of this document indicates review and certification of the therapy plan).              Referring Provider:  Kimberlyn Hicks CNP    Initial Assessment  See  Epic Evaluation- Start of Care Date: 03/28/24

## 2024-04-03 ENCOUNTER — APPOINTMENT (OUTPATIENT)
Dept: URBAN - METROPOLITAN AREA CLINIC 256 | Age: 74
Setting detail: DERMATOLOGY
End: 2024-04-04

## 2024-04-03 VITALS — HEIGHT: 61 IN | WEIGHT: 176 LBS

## 2024-04-03 DIAGNOSIS — L21.8 OTHER SEBORRHEIC DERMATITIS: ICD-10-CM

## 2024-04-03 DIAGNOSIS — L82.0 INFLAMED SEBORRHEIC KERATOSIS: ICD-10-CM

## 2024-04-03 DIAGNOSIS — D18.0 HEMANGIOMA: ICD-10-CM

## 2024-04-03 DIAGNOSIS — D22 MELANOCYTIC NEVI: ICD-10-CM

## 2024-04-03 DIAGNOSIS — Z71.89 OTHER SPECIFIED COUNSELING: ICD-10-CM

## 2024-04-03 DIAGNOSIS — L57.8 OTHER SKIN CHANGES DUE TO CHRONIC EXPOSURE TO NONIONIZING RADIATION: ICD-10-CM

## 2024-04-03 DIAGNOSIS — L57.0 ACTINIC KERATOSIS: ICD-10-CM

## 2024-04-03 DIAGNOSIS — L71.8 OTHER ROSACEA: ICD-10-CM

## 2024-04-03 DIAGNOSIS — L82.1 OTHER SEBORRHEIC KERATOSIS: ICD-10-CM

## 2024-04-03 DIAGNOSIS — Z85.820 PERSONAL HISTORY OF MALIGNANT MELANOMA OF SKIN: ICD-10-CM

## 2024-04-03 PROBLEM — D22.72 MELANOCYTIC NEVI OF LEFT LOWER LIMB, INCLUDING HIP: Status: ACTIVE | Noted: 2024-04-03

## 2024-04-03 PROBLEM — D22.61 MELANOCYTIC NEVI OF RIGHT UPPER LIMB, INCLUDING SHOULDER: Status: ACTIVE | Noted: 2024-04-03

## 2024-04-03 PROBLEM — D18.01 HEMANGIOMA OF SKIN AND SUBCUTANEOUS TISSUE: Status: ACTIVE | Noted: 2024-04-03

## 2024-04-03 PROBLEM — D22.39 MELANOCYTIC NEVI OF OTHER PARTS OF FACE: Status: ACTIVE | Noted: 2024-04-03

## 2024-04-03 PROBLEM — D22.62 MELANOCYTIC NEVI OF LEFT UPPER LIMB, INCLUDING SHOULDER: Status: ACTIVE | Noted: 2024-04-03

## 2024-04-03 PROBLEM — D22.5 MELANOCYTIC NEVI OF TRUNK: Status: ACTIVE | Noted: 2024-04-03

## 2024-04-03 PROBLEM — D22.71 MELANOCYTIC NEVI OF RIGHT LOWER LIMB, INCLUDING HIP: Status: ACTIVE | Noted: 2024-04-03

## 2024-04-03 PROCEDURE — 17003 DESTRUCT PREMALG LES 2-14: CPT | Mod: 59

## 2024-04-03 PROCEDURE — OTHER MIPS QUALITY: OTHER

## 2024-04-03 PROCEDURE — 99214 OFFICE O/P EST MOD 30 MIN: CPT | Mod: 25

## 2024-04-03 PROCEDURE — 17110 DESTRUCT B9 LESION 1-14: CPT

## 2024-04-03 PROCEDURE — 17000 DESTRUCT PREMALG LESION: CPT | Mod: 59

## 2024-04-03 PROCEDURE — OTHER PRESCRIPTION: OTHER

## 2024-04-03 PROCEDURE — OTHER LIQUID NITROGEN: OTHER

## 2024-04-03 PROCEDURE — OTHER PRESCRIPTION MEDICATION MANAGEMENT: OTHER

## 2024-04-03 PROCEDURE — OTHER COUNSELING: OTHER

## 2024-04-03 RX ORDER — KETOCONAZOLE 20 MG/G
CREAM TOPICAL BID
Qty: 30 | Refills: 3 | Status: ERX | COMMUNITY
Start: 2024-04-03

## 2024-04-03 RX ORDER — METRONIDAZOLE 7.5 MG/G
GEL TOPICAL QHS
Qty: 45 | Refills: 3 | Status: ERX | COMMUNITY
Start: 2024-04-03

## 2024-04-03 RX ORDER — HYDROCORTISONE 25 MG/G
CREAM TOPICAL BID
Qty: 30 | Refills: 3 | Status: ERX | COMMUNITY
Start: 2024-04-03

## 2024-04-03 ASSESSMENT — LOCATION DETAILED DESCRIPTION DERM
LOCATION DETAILED: RIGHT DISTAL POSTERIOR UPPER ARM
LOCATION DETAILED: RIGHT MID-UPPER BACK
LOCATION DETAILED: LEFT MEDIAL UPPER BACK
LOCATION DETAILED: LEFT DISTAL POSTERIOR UPPER ARM
LOCATION DETAILED: LEFT DISTAL POSTERIOR THIGH
LOCATION DETAILED: LEFT ANTERIOR PROXIMAL THIGH
LOCATION DETAILED: RIGHT DISTAL POSTERIOR THIGH
LOCATION DETAILED: RIGHT VENTRAL DISTAL FOREARM
LOCATION DETAILED: LEFT MID-UPPER BACK
LOCATION DETAILED: LEFT INFERIOR FOREHEAD
LOCATION DETAILED: LEFT SUPERIOR CENTRAL MALAR CHEEK
LOCATION DETAILED: LEFT SUPERIOR LATERAL BUCCAL CHEEK
LOCATION DETAILED: LEFT LATERAL ABDOMEN
LOCATION DETAILED: LEFT LATERAL MALAR CHEEK
LOCATION DETAILED: RIGHT MEDIAL TEMPLE
LOCATION DETAILED: LEFT INFERIOR CENTRAL MALAR CHEEK
LOCATION DETAILED: RIGHT CENTRAL TEMPLE
LOCATION DETAILED: LEFT VENTRAL PROXIMAL FOREARM
LOCATION DETAILED: RIGHT SUPERIOR CENTRAL BUCCAL CHEEK
LOCATION DETAILED: LEFT INFERIOR MEDIAL MIDBACK
LOCATION DETAILED: RIGHT ANTERIOR DISTAL THIGH
LOCATION DETAILED: LEFT SUPERIOR LATERAL UPPER BACK

## 2024-04-03 ASSESSMENT — LOCATION ZONE DERM
LOCATION ZONE: TRUNK
LOCATION ZONE: LEG
LOCATION ZONE: ARM
LOCATION ZONE: FACE

## 2024-04-03 ASSESSMENT — LOCATION SIMPLE DESCRIPTION DERM
LOCATION SIMPLE: RIGHT THIGH
LOCATION SIMPLE: LEFT FOREHEAD
LOCATION SIMPLE: RIGHT TEMPLE
LOCATION SIMPLE: RIGHT UPPER ARM
LOCATION SIMPLE: RIGHT FOREARM
LOCATION SIMPLE: LEFT UPPER BACK
LOCATION SIMPLE: LEFT FOREARM
LOCATION SIMPLE: LEFT UPPER ARM
LOCATION SIMPLE: RIGHT UPPER BACK
LOCATION SIMPLE: RIGHT POSTERIOR THIGH
LOCATION SIMPLE: RIGHT CHEEK
LOCATION SIMPLE: LEFT CHEEK
LOCATION SIMPLE: LEFT POSTERIOR THIGH
LOCATION SIMPLE: ABDOMEN
LOCATION SIMPLE: LEFT THIGH
LOCATION SIMPLE: LEFT LOWER BACK

## 2024-04-03 NOTE — HPI: FULL BODY SKIN EXAMINATION
What Is The Reason For Today's Visit?: Full Body Skin Examination
What Is The Reason For Today's Visit? (Being Monitored For X): concerning skin lesions on an annual basis
Additional History: Corn \\nBlack spot on back\\nGroin spot \\nItchy spot

## 2024-04-03 NOTE — PROCEDURE: PRESCRIPTION MEDICATION MANAGEMENT
Detail Level: Zone
Render In Strict Bullet Format?: No
Initiate Treatment: Hydrocortisone 2.5% topical cream/Ketoconazole 2% topical cream mixture BID on affected areas.
Continue Regimen: Metronidazole 0.75% topical gel QHS on the face.

## 2024-04-03 NOTE — PROCEDURE: LIQUID NITROGEN
Show Applicator Variable?: Yes
Detail Level: Detailed
Duration Of Freeze Thaw-Cycle (Seconds): 3
Post-Care Instructions: I reviewed with the patient in detail post-care instructions. Patient is to wear sunprotection, and avoid picking at any of the treated lesions. Pt may apply Vaseline to crusted or scabbing areas.
Add 52 Modifier (Optional): no
Number Of Freeze-Thaw Cycles: 1 freeze-thaw cycle
Spray Paint Text: The liquid nitrogen was applied to the skin utilizing a spray paint frosting technique.
Medical Necessity Information: It is in your best interest to select a reason for this procedure from the list below. All of these items fulfill various CMS LCD requirements except the new and changing color options.
Medical Necessity Clause: This procedure was medically necessary because the lesions that were treated were:
Consent: The patient's verbal consent was obtained including but not limited to risks of crusting, scabbing, blistering, scarring, darker or lighter pigmentary change, recurrence, incomplete removal and infection.
Duration Of Freeze Thaw-Cycle (Seconds): 0
Consent: The patient's consent was obtained including but not limited to risks of crusting, scabbing, blistering, scarring, darker or lighter pigmentary change, recurrence, incomplete removal and infection.

## 2024-04-04 ENCOUNTER — THERAPY VISIT (OUTPATIENT)
Dept: PHYSICAL THERAPY | Facility: REHABILITATION | Age: 74
End: 2024-04-04
Payer: MEDICARE

## 2024-04-04 ENCOUNTER — RADIOLOGY INJECTION OFFICE VISIT (OUTPATIENT)
Dept: PHYSICAL MEDICINE AND REHAB | Facility: CLINIC | Age: 74
End: 2024-04-04
Attending: PHYSICIAN ASSISTANT
Payer: MEDICARE

## 2024-04-04 VITALS
HEART RATE: 70 BPM | RESPIRATION RATE: 16 BRPM | OXYGEN SATURATION: 96 % | TEMPERATURE: 97.7 F | DIASTOLIC BLOOD PRESSURE: 64 MMHG | SYSTOLIC BLOOD PRESSURE: 128 MMHG

## 2024-04-04 DIAGNOSIS — M47.812 SPONDYLOSIS OF CERVICAL REGION WITHOUT MYELOPATHY OR RADICULOPATHY: ICD-10-CM

## 2024-04-04 DIAGNOSIS — M79.18 MYOFASCIAL PAIN: ICD-10-CM

## 2024-04-04 DIAGNOSIS — M54.12 CERVICAL RADICULAR PAIN: Primary | ICD-10-CM

## 2024-04-04 DIAGNOSIS — M47.812 FACET ARTHROPATHY, CERVICAL: ICD-10-CM

## 2024-04-04 PROCEDURE — 64491 INJ PARAVERT F JNT C/T 2 LEV: CPT | Mod: RT | Performed by: PAIN MEDICINE

## 2024-04-04 PROCEDURE — 97110 THERAPEUTIC EXERCISES: CPT | Mod: GP | Performed by: PHYSICAL THERAPIST

## 2024-04-04 PROCEDURE — 64490 INJ PARAVERT F JNT C/T 1 LEV: CPT | Mod: RT | Performed by: PAIN MEDICINE

## 2024-04-04 RX ORDER — LIDOCAINE HYDROCHLORIDE 10 MG/ML
INJECTION, SOLUTION EPIDURAL; INFILTRATION; INTRACAUDAL; PERINEURAL
Status: COMPLETED | OUTPATIENT
Start: 2024-04-04 | End: 2024-04-04

## 2024-04-04 RX ADMIN — LIDOCAINE HYDROCHLORIDE 3 ML: 10 INJECTION, SOLUTION EPIDURAL; INFILTRATION; INTRACAUDAL; PERINEURAL at 14:05

## 2024-04-04 ASSESSMENT — PAIN SCALES - GENERAL
PAINLEVEL: SEVERE PAIN (6)
PAINLEVEL: NO PAIN (1)

## 2024-04-04 NOTE — PATIENT INSTRUCTIONS
Please complete your pain diary and return the diary to the Spine Center at your earliest convenience.  The Spine Center will contact you to schedule your next appointment after your pain diary is reviewed by your doctor.  Thank you.    DISCHARGE INSTRUCTIONS    During office hours (8:00 a.m.- 4:00 p.m.) questions or concerns may be answered  by calling Spine Center Navigation Nurses at  [phone].  Messages received after hours will be returned the following business day.      In the case of an emergency, please dial 911 or seek assistance at the nearest Emergency Room/Urgent Care facility.     All Patients:  You may experience an increase in your symptoms for the first 2 days, once the numbing medication wears off.    You may resume your regular medication, no pain medication until you have completed your diary    You may shower. No swimming, tub bath or hot tub for 24 hours; remove bandage after 4 hours    Continue your activities that can cause you pain to test the blocks.                         You should not drive for the next 3 to 5 hours (have someone drive you)           POSSIBLE PROCEDURE SIDE EFFECTS  -Call Spine Center if concerned-    Increased Pain  Increased numbness/tingling     Nausea/Vomiting  Bruising/bleeding at site (hematoma)             Swelling at site (edema) Headache  Difficulty walking  Infection        Fever greater than 100.5

## 2024-04-08 ENCOUNTER — TELEPHONE (OUTPATIENT)
Dept: PHYSICAL MEDICINE AND REHAB | Facility: CLINIC | Age: 74
End: 2024-04-08

## 2024-04-08 ENCOUNTER — THERAPY VISIT (OUTPATIENT)
Dept: PHYSICAL THERAPY | Facility: REHABILITATION | Age: 74
End: 2024-04-08
Payer: MEDICARE

## 2024-04-08 DIAGNOSIS — M79.18 MYOFASCIAL PAIN: ICD-10-CM

## 2024-04-08 DIAGNOSIS — M47.812 SPONDYLOSIS OF CERVICAL REGION WITHOUT MYELOPATHY OR RADICULOPATHY: Primary | ICD-10-CM

## 2024-04-08 DIAGNOSIS — M54.12 CERVICAL RADICULAR PAIN: Primary | ICD-10-CM

## 2024-04-08 DIAGNOSIS — F41.9 ANXIETY DUE TO INVASIVE PROCEDURE: Primary | ICD-10-CM

## 2024-04-08 DIAGNOSIS — M47.812 FACET ARTHROPATHY, CERVICAL: ICD-10-CM

## 2024-04-08 PROCEDURE — 97110 THERAPEUTIC EXERCISES: CPT | Mod: GP | Performed by: PHYSICAL THERAPIST

## 2024-04-08 RX ORDER — LORAZEPAM 1 MG/1
TABLET ORAL
Qty: 2 TABLET | Refills: 0 | Status: SHIPPED | OUTPATIENT
Start: 2024-04-08 | End: 2024-05-02

## 2024-04-08 NOTE — PATIENT INSTRUCTIONS
Exercises to help with back and wrist      Stretch with legs wide down to the floor for x3-5 seconds x5-10 reps      Gently stretching into trunk twist side to side x5-10 reps      Gently stretch side to side tipping over x5-10 reps      Bending wrist forwards and backwards x5-10 reps only through motion that feels comfortable to do

## 2024-04-08 NOTE — TELEPHONE ENCOUNTER
Attempted to contact patient, but she was unreachable at this time.  A message was left with the patient informing her that ELAYNE Quintanilla was recommending that she move forward with a right C2, C3, C4 medial branch radiofrequency ablation given the positive response she had to the confirmatory blocks that were recently completed by Dr. Morales at the Spine Center.     The patient was informed that she would be contacted to schedule the procedure once the prior authorization had been received from her insurance carrier.  She was also informed that Kimberlyn had ordered sedation medication for her upcoming procedure that would need to be picked up and taken as prescribed on the day of her procedure.    She was encouraged to contact the Spine Center if she had any questions or concerns in the meantime.

## 2024-04-15 ENCOUNTER — THERAPY VISIT (OUTPATIENT)
Dept: PHYSICAL THERAPY | Facility: REHABILITATION | Age: 74
End: 2024-04-15
Payer: MEDICARE

## 2024-04-15 DIAGNOSIS — M47.812 FACET ARTHROPATHY, CERVICAL: ICD-10-CM

## 2024-04-15 DIAGNOSIS — M54.12 CERVICAL RADICULAR PAIN: Primary | ICD-10-CM

## 2024-04-15 DIAGNOSIS — M79.18 MYOFASCIAL PAIN: ICD-10-CM

## 2024-04-15 PROCEDURE — 97110 THERAPEUTIC EXERCISES: CPT | Mod: GP | Performed by: PHYSICAL THERAPIST

## 2024-04-15 NOTE — PATIENT INSTRUCTIONS
Stand with left side against the wall and rotate R arm backwards x5-10 reps 1-2x/day    Can try flexing and extending R big toe x10-20 reps    Can try pulling on end of toe to help with toe pain - look for relief

## 2024-04-22 ENCOUNTER — THERAPY VISIT (OUTPATIENT)
Dept: PHYSICAL THERAPY | Facility: REHABILITATION | Age: 74
End: 2024-04-22
Payer: MEDICARE

## 2024-04-22 DIAGNOSIS — M54.12 CERVICAL RADICULAR PAIN: Primary | ICD-10-CM

## 2024-04-22 DIAGNOSIS — M47.812 FACET ARTHROPATHY, CERVICAL: ICD-10-CM

## 2024-04-22 DIAGNOSIS — M79.18 MYOFASCIAL PAIN: ICD-10-CM

## 2024-04-22 PROCEDURE — 97535 SELF CARE MNGMENT TRAINING: CPT | Mod: GP | Performed by: PHYSICAL THERAPIST

## 2024-04-22 PROCEDURE — 97110 THERAPEUTIC EXERCISES: CPT | Mod: GP | Performed by: PHYSICAL THERAPIST

## 2024-04-22 NOTE — PATIENT INSTRUCTIONS
Bend trunk down to floor in sitting x10 reps and then can hold the 11th rep for 20-30 seconds    Trial voltaren on your 1st toe for 2-3 days to see if you get some relief of toe pain    WHEN YOU do your isometrics - you can pull belly button and gluts added to that routine    Sit to stand  Can try different furniture levels so you can do more reps in a row - 10-12 reps and work up to 15 reps and more as able - slowly work up

## 2024-05-01 ENCOUNTER — TELEPHONE (OUTPATIENT)
Dept: PHYSICAL MEDICINE AND REHAB | Facility: CLINIC | Age: 74
End: 2024-05-01
Payer: MEDICARE

## 2024-05-01 DIAGNOSIS — F41.9 ANXIETY DUE TO INVASIVE PROCEDURE: ICD-10-CM

## 2024-05-01 NOTE — TELEPHONE ENCOUNTER
Other: pt was prescribed 2 tabs of lorazepam to take for appt tomorrow 05/02. Pt lost the medication, wondering if new rx can be sent to pharmacy? [facility] [phone]. Pt request for care team to call her so pt knows what to do.       Could we [facility] this information to you [facility] or would you prefer to receive a phone call?:   Patient would prefer a phone call   Okay to leave a detailed message?: Yes at Cell number on file:    Telephone Information:   Mobile [phone]

## 2024-05-02 ENCOUNTER — RADIOLOGY INJECTION OFFICE VISIT (OUTPATIENT)
Dept: PHYSICAL MEDICINE AND REHAB | Facility: CLINIC | Age: 74
End: 2024-05-02
Attending: PHYSICIAN ASSISTANT
Payer: MEDICARE

## 2024-05-02 VITALS
DIASTOLIC BLOOD PRESSURE: 86 MMHG | SYSTOLIC BLOOD PRESSURE: 126 MMHG | HEART RATE: 76 BPM | OXYGEN SATURATION: 94 % | TEMPERATURE: 97.7 F | RESPIRATION RATE: 16 BRPM

## 2024-05-02 DIAGNOSIS — M47.812 SPONDYLOSIS OF CERVICAL REGION WITHOUT MYELOPATHY OR RADICULOPATHY: ICD-10-CM

## 2024-05-02 PROCEDURE — 64633 DESTROY CERV/THOR FACET JNT: CPT | Mod: RT | Performed by: PAIN MEDICINE

## 2024-05-02 PROCEDURE — 64634 DESTROY C/TH FACET JNT ADDL: CPT | Mod: RT | Performed by: PAIN MEDICINE

## 2024-05-02 RX ORDER — BUPIVACAINE HYDROCHLORIDE 2.5 MG/ML
INJECTION, SOLUTION EPIDURAL; INFILTRATION; INTRACAUDAL
Status: COMPLETED | OUTPATIENT
Start: 2024-05-02 | End: 2024-05-02

## 2024-05-02 RX ORDER — LIDOCAINE HYDROCHLORIDE 10 MG/ML
INJECTION, SOLUTION EPIDURAL; INFILTRATION; INTRACAUDAL; PERINEURAL
Status: COMPLETED | OUTPATIENT
Start: 2024-05-02 | End: 2024-05-02

## 2024-05-02 RX ORDER — LORAZEPAM 1 MG/1
TABLET ORAL
Qty: 2 TABLET | Refills: 0 | Status: SHIPPED | OUTPATIENT
Start: 2024-05-02 | End: 2024-05-02

## 2024-05-02 RX ADMIN — LIDOCAINE HYDROCHLORIDE 3 ML: 10 INJECTION, SOLUTION EPIDURAL; INFILTRATION; INTRACAUDAL; PERINEURAL at 15:28

## 2024-05-02 RX ADMIN — BUPIVACAINE HYDROCHLORIDE 3 ML: 2.5 INJECTION, SOLUTION EPIDURAL; INFILTRATION; INTRACAUDAL at 15:28

## 2024-05-02 ASSESSMENT — PAIN SCALES - GENERAL
PAINLEVEL: NO PAIN (0)
PAINLEVEL: MILD PAIN (2)

## 2024-05-02 NOTE — PATIENT INSTRUCTIONS
DISCHARGE INSTRUCTIONS    During office hours (8:00 a.m.-4:00 p.m.) questions or concerns may be answered  by calling Spine Center Navigation Nurses at  [phone].  Messages received after hours will be returned the following business day.      In the case of an emergency,please dial 911 or seek assistance at the nearest Emergency Room/Urgent Care facility.     All Patients:  You may experience an increase in your symptoms forthe first 5-7 days. Soreness may persist during the first few weeks as it takes 4-6 weeks for the nerves to fully heal.    You may use ice on the injection site,as frequently as 20 minutes each hour if needed. It is recommended NOT to apply heat during the first 24 hours.    You may take your pain medicine.    You may continue taking your regular medication.    You may shower. No swimming, tub bath or hot tub for 48hours. This also includes no lotions, ointments or patches to the needle sites as well. You may remove your bandaid/bandage as soon as you are home.    You mayresume light activities, as tolerated.    Resume your usual diet as tolerated.    It is strongly advisedthat you do not drive for 1-3 hours post injection.    If you have had oral sedation:  Do not drive for 8 hours post injection.             POSSIBLE PROCEDURE SIDE EFFECTS    -Call the Spine Center if you are concerned    IncreasedPain           Increased numbness/tingling      Nausea/Vomiting          Bruising/bleeding at site      Redness or swelling  Difficulty walking      Weakness          Fever greater than 100.5

## 2024-05-02 NOTE — TELEPHONE ENCOUNTER
Called patient and informed her that new Rx was sent to her pharmacy. Discussed when to take medication. No further questions at this time.

## 2024-05-21 ENCOUNTER — THERAPY VISIT (OUTPATIENT)
Dept: PHYSICAL THERAPY | Facility: REHABILITATION | Age: 74
End: 2024-05-21
Payer: MEDICARE

## 2024-05-21 DIAGNOSIS — M79.18 MYOFASCIAL PAIN: ICD-10-CM

## 2024-05-21 DIAGNOSIS — M54.12 CERVICAL RADICULAR PAIN: Primary | ICD-10-CM

## 2024-05-21 DIAGNOSIS — M47.812 FACET ARTHROPATHY, CERVICAL: ICD-10-CM

## 2024-05-21 PROCEDURE — 97110 THERAPEUTIC EXERCISES: CPT | Mod: GP | Performed by: PHYSICAL THERAPIST

## 2024-05-21 NOTE — PATIENT INSTRUCTIONS
Exercise List    Neck flexion - bending forward  Neck side bending - bending side to side  Neck rotation side to side  Chin tuck  scap retraction - shoulder blade squeeze  seated trunk flex - bending forward towards your toes  Trunk rotation - sitting or standing - twisting your shoulders side to side  Trunk side bending - reaching your arms down each side  1st toe flex/ext - bend and flex  Stomach squeeze - tighten abdominals  sit to stand from elevated surface   NEW - upper back stretch over back of chair    X5-10 seconds x5-10 reps

## 2024-05-26 ENCOUNTER — HEALTH MAINTENANCE LETTER (OUTPATIENT)
Age: 74
End: 2024-05-26

## 2024-05-30 ENCOUNTER — THERAPY VISIT (OUTPATIENT)
Dept: PHYSICAL THERAPY | Facility: REHABILITATION | Age: 74
End: 2024-05-30
Payer: MEDICARE

## 2024-05-30 DIAGNOSIS — M47.812 FACET ARTHROPATHY, CERVICAL: ICD-10-CM

## 2024-05-30 DIAGNOSIS — M79.18 MYOFASCIAL PAIN: ICD-10-CM

## 2024-05-30 DIAGNOSIS — M54.12 CERVICAL RADICULAR PAIN: Primary | ICD-10-CM

## 2024-05-30 PROCEDURE — 97110 THERAPEUTIC EXERCISES: CPT | Mod: GP | Performed by: PHYSICAL THERAPIST

## 2024-06-06 ENCOUNTER — THERAPY VISIT (OUTPATIENT)
Dept: PHYSICAL THERAPY | Facility: REHABILITATION | Age: 74
End: 2024-06-06
Payer: MEDICARE

## 2024-06-06 DIAGNOSIS — M54.12 CERVICAL RADICULAR PAIN: Primary | ICD-10-CM

## 2024-06-06 DIAGNOSIS — M79.18 MYOFASCIAL PAIN: ICD-10-CM

## 2024-06-06 DIAGNOSIS — M47.812 FACET ARTHROPATHY, CERVICAL: ICD-10-CM

## 2024-06-06 PROCEDURE — 97110 THERAPEUTIC EXERCISES: CPT | Mod: GP | Performed by: PHYSICAL THERAPIST

## 2024-06-13 ENCOUNTER — THERAPY VISIT (OUTPATIENT)
Dept: PHYSICAL THERAPY | Facility: REHABILITATION | Age: 74
End: 2024-06-13
Payer: MEDICARE

## 2024-06-13 DIAGNOSIS — M47.812 FACET ARTHROPATHY, CERVICAL: ICD-10-CM

## 2024-06-13 DIAGNOSIS — M54.12 CERVICAL RADICULAR PAIN: Primary | ICD-10-CM

## 2024-06-13 DIAGNOSIS — M79.18 MYOFASCIAL PAIN: ICD-10-CM

## 2024-06-13 PROCEDURE — 97110 THERAPEUTIC EXERCISES: CPT | Mod: GP | Performed by: PHYSICAL THERAPIST

## 2024-06-13 NOTE — PATIENT INSTRUCTIONS
Reach across stretch - CHANGE to reaching up overhead to the back and then fold arm back over to front      X5-10 reps on each side      1 loop of band around doorknob to do the rotation exercise for whole body  VS  Middle of band around doorknob so both loops are free for hands to do rowing exercises - elbows bent and elbows straight  PULL front points of shoulder back and squeeze shoulder blades to open up your chest for better breathing    FOR LOW BACK    TRY morning stretches  LONG pencil stretch for 10 seconds x2-3 reps  Pelvic tilts x10 reps  Small knee twist side to side x10 reps         Raising knee to nose x5-10 reps        TRY STARTING TO USE STEPPER AGAIN - start with 500 steps and work up from there - start with every other day and can work up to 5 times per week

## 2024-07-02 ENCOUNTER — THERAPY VISIT (OUTPATIENT)
Dept: PHYSICAL THERAPY | Facility: REHABILITATION | Age: 74
End: 2024-07-02
Payer: MEDICARE

## 2024-07-02 DIAGNOSIS — M79.18 MYOFASCIAL PAIN: ICD-10-CM

## 2024-07-02 DIAGNOSIS — M47.812 FACET ARTHROPATHY, CERVICAL: ICD-10-CM

## 2024-07-02 DIAGNOSIS — M54.12 CERVICAL RADICULAR PAIN: Primary | ICD-10-CM

## 2024-07-02 PROCEDURE — 97110 THERAPEUTIC EXERCISES: CPT | Mod: GP | Performed by: PHYSICAL THERAPIST

## 2024-07-02 NOTE — PATIENT INSTRUCTIONS
Try to do the blinds with left arm!      1 or both of your hands holding on to your skin as you try to nod your head forward x10-20 reps  1-2x/day    Walking program  Try 2 blocks down and back and then see how you feel for 24 hours afterwards - try to do every other day for 1-2 weeks and if going well can do 5x/week     Work on the peddler on days you can't get outside to try to walk

## 2024-07-02 NOTE — PROGRESS NOTES
TWO- MINUTE WALK TEST (2mwt) MEANS     MEAN DISTANCE (FEET) METERS       + cm   18-54 600.4 183 m 0.192  cm   55-59 578.7 176 m 38.776 cm   60-64 545.9 166 m 32.936 cm   65-69 509.2 155 m 20.416 cm   70-74 478.7 145 m 95.9576 cm   75-79 462.3 140 m 90.904 cm   80-85 440.6 136 m 29.488 cm         MEN   MEAN DISTANCE (FEET) METERS     + cm   18-54 659.1 200 m 89.368 cm   55-59 626.6 190 m 98.768 cm   60-64 587.6 179 m 10.048 cm   65-69 604.3 184 m 19.064 cm   70-74 565.6 172 m 39.488 cm   75-79 517.1 157 m 61.208 cm   80-85 472.7 144 m 7.896 cm             R.W Rosi et al (2014)  [facility] Sandy Murillo PT, CLT-MARCELLA

## 2024-07-03 NOTE — PROGRESS NOTES
[facility]                                                                                   OUTPATIENT PHYSICAL THERAPY    PLAN OF TREATMENT FOR OUTPATIENT REHABILITATION   Patient's Last Name, First Name, Janey Persaud YOB: 1950   Provider's Name   [facility]   Medical Record No.  [id number]     Onset Date: 02/26/24  Start of Care Date: 03/28/24     Medical Diagnosis:  Cervical radicular pain (M54.12)    Facet arthropathy, cervical (M47.812)    Myofascial pain (M79.18)      PT Treatment Diagnosis:  Cervical radicular pain (M54.12)    Facet arthropathy, cervical (M47.812)    Myofascial pain (M79.18) Plan of Treatment  Frequency/Duration: 1x/every 1-2 weeks/ 90 days    Certification date from 06/25/24 to 09/22/24         See note for plan of treatment details and functional goals     Kiki Troncoso, PT , DPT, CLT                       I CERTIFY THE NEED FOR THESE SERVICES FURNISHED UNDER        THIS PLAN OF TREATMENT AND WHILE UNDER MY CARE     (Physician attestation of this document indicates review and certification of the therapy plan).              Referring Provider:  Kimberlyn Hicks PA-C    Initial Assessment  See Epic Evaluation- Start of Care Date: 03/28/24    Beginning/End Dates of Progress Note Reporting Period:  03/28/24 to 07/02/2024 07/02/24 0500   Appointment Info   Signing clinician's name / credentials Kiki Troncoso PT, DPT, CLT   Total/Authorized Visits 12   Visits Used 10   Medical Diagnosis Cervical radicular pain (M54.12)    Facet arthropathy, cervical (M47.812)    Myofascial pain (M79.18)   PT Tx Diagnosis Cervical radicular pain (M54.12)    Facet arthropathy, cervical (M47.812)    Myofascial pain (M79.18)   Quick Adds Certification   Progress Note/Certification   Start of Care Date 03/28/24   Onset of illness/injury or Date of Surgery 02/26/24   Therapy Frequency 1x/every 1-2 weeks   Predicted  Duration 90 days   Certification date from 06/25/24   Certification date to 09/22/24   Progress Note Due Date 09/22/24   Progress Note Completed Date 03/28/24   GOALS   PT Goals 2;3   PT Goal 1   Goal Description Pt will be able to report not having daily headaches for improved quality of life in 90 days.   Goal Progress Progressing towards - overall better but has been having some increased headaches in the mornings - but only rarely taking medicine for it   Target Date 06/25/24   PT Goal 2   Goal Description Pt will be able to perform ADLs with R arm reaching overhead without increase in neck pain for improved QOL in 90 days.   Goal Progress Progressing towards - continuing to be able to use arms with ADLs with less pain and more freedom   Target Date 06/25/24   Subjective Report   Subjective Report Pt has purchased new tennis shoes and they are feeling pretty good - a little blister on her R 5th toe but getting used to the shoes. Pt reports neck overall has been feeling pretty good except still upper R side. Pt reports low back has been feeling pretty good today - used her massager yesterday.   Objective Measures   Objective Measures Objective Measure 1;Objective Measure 2;Objective Measure 3;Objective Measure 4   Objective Measure 1   Objective Measure ROM - from last visit   Details Cervical flex 54 degrees at comfortable level with mostly full range - hitting chest wall today (was 35-50 degrees), ext 33 degrees (was 22-38 degrees with pain), R rotation 50 degrees with slight pain end range (was 35 with pain) and L rotation 52 degrees (was 32 degrees)   Objective Measure 2   Objective Measure Observation   Details Able to do s/l thoracic rotation with pec stretch with good relief felt both sides - needing minimal tactile cueing to increase scap retraction   Objective Measure 3   Objective Measure Strength - from last visit   Details B UE grossly 5/5 (had rotator cuff weakness initially), B LE grossly 5/5 except  L hip flexor 4/5   Objective Measure 4   Objective Measure 2 minute walk test   Details 3/10 RPE - 346 ft (age/gender norm 462 ft)   Treatment Interventions (PT)   Interventions Therapeutic Procedure/Exercise;Neuromuscular Re-education;Self Care/Home Management   Therapeutic Procedure/Exercise   Therapeutic Procedures Ther Proc 2;Ther Proc 3;Ther Proc 4   Ther Proc 1 Reviewed HEP list   Ther Proc 1 - Details Per pt instructions past visits - Exercise List    Neck flexion - bending forward  Neck side bending - bending side to side  Neck rotation side to side  Chin tuck  scap retraction - shoulder blade squeeze  seated trunk flex - bending forward towards your toes  Trunk rotation - sitting or standing - twisting your shoulders side to side  Trunk side bending - reaching your arms down each side  1st toe flex/ext - bend and flex  Stomach squeeze - tighten abdominals  sit to stand from elevated surface   NEW - upper back stretch over back of chair     X5-10 seconds x5-10 reps   Ther Proc 2 Performed 2 minute walk test - discussed starting walking program for 2-4 blocks every other day and progress up to 5 x/week as feels easy to do - per pt instructions   Ther Proc 2 - Details Performed sub-occipital self stretch with tactile, visual and verbal cues for performance   Ther Proc 3 SKTC - continue   Ther Proc 3 - Details Pencil stretch in morning - continue   Ther Proc 4 LTR x5-10 reps in morning - continue   Ther Proc 4 - Details Pelvic tilts in sitting x10 reps - to do in bed in the morning - continue   PTRx Ther Proc 1 Shoulder Theraband Rows   PTRx Ther Proc 1 - Details DO ELBOWS STRAIGHT and TRY TO pull front of shoulders back! continue   PTRx Ther Proc 2 Paloff Press - Rotation Variation   PTRx Ther Proc 2 - Details 1st version - single band - arms by stomach Next harder version - single band with arms out in front next harder version - double band strands with arms by stomach hardest version - double band strands  "with arms out in front! x10 reps each version B- continue   PTRx Ther Proc 3 Cervical ROM Side Bending   PTRx Ther Proc 3 - Details continue   PTRx Ther Proc 4 Cervical Retraction   PTRx Ther Proc 4 - Details continue   PTRx Ther Proc 5 Scapular Retraction/Depression   PTRx Ther Proc 5 - Details continue   Skilled Intervention Reassessed response to HEP and activity level over the past 3 weeks. Discussed pt's new shoeware to be able to start a walking program. Performed 2 minute walk testing and discussed results. Advised pt on initiation of walking program and to use her peddler when unable to walk outside. Education pt on cervicogenic headache with \"keven's horn\" distribution. Reviewed and performed upper body/quarter mobility exercises and added to HEP per pt instructions and printed AVS for home. Verbal and visual cues given for appropriate positioning and performance.   Patient Response/Progress sub occipital stretch felt good for R upper neck pain   Plan   Home program HEP - Neck flexion, SB and rotation, chin tuck, scap retraction, seated trunk flex, rotation and SB seated, 1st toe flex/ext, standing thoracic rotation with arm reach, TA and glut iso, sit to stand from elevated surface, s/l bow and arrow stretch with [place], rows arms straight and bent, trunk rot resisted arms straight and bent, sub-occipital stretch   Plan for next session Recommend continue PT services due to improved by continued impairments 1x/every 1-2 weeks for 90 days. Reassess response to HEP - AROM and strengthening. Reassess ROM and progress with postural mobility - thoracic extension - and strengthening exercises as able.   Comments   Comments Today pt edgar's below age gender norms for 2 minute walking distance. From last visits Pt edgar's continued improvements to pain levels and functional mobility but has continued ROM and upper body mobility and minimal strength deficits as well as SIJ and foot pain that limits her activity level. " Pt remains good candidate for skilled PT services to address impairments and reach goals.

## 2024-07-05 DIAGNOSIS — G57.02 NEUROPATHY, SCIATIC, LEFT: ICD-10-CM

## 2024-07-05 RX ORDER — GABAPENTIN 300 MG/1
300 CAPSULE ORAL 2 TIMES DAILY
Qty: 60 CAPSULE | Refills: 0 | Status: SHIPPED | OUTPATIENT
Start: 2024-07-05 | End: 2024-09-20

## 2024-07-05 NOTE — TELEPHONE ENCOUNTER
Health Call Center    Phone Message    May a detailed message be left on voicemail: yes     Reason for Call: Medication Refill Request    Has the patient contacted the pharmacy for the refill? Yes   Name of medication being requested: gabapentin (NEURONTIN) 300 MG capsule    Provider who prescribed the medication: Dr. Maya    Pharmacy: [address]    Date medication is needed: Pt is out of medication    Return pt call at [phone]    Action Taken: Message routed to:  [facility] ([facility] Neurology    Travel Screening: Not Applicable     Date of Service:

## 2024-07-16 ENCOUNTER — THERAPY VISIT (OUTPATIENT)
Dept: PHYSICAL THERAPY | Facility: REHABILITATION | Age: 74
End: 2024-07-16
Payer: MEDICARE

## 2024-07-16 DIAGNOSIS — M54.12 CERVICAL RADICULAR PAIN: Primary | ICD-10-CM

## 2024-07-16 DIAGNOSIS — M47.812 FACET ARTHROPATHY, CERVICAL: ICD-10-CM

## 2024-07-16 DIAGNOSIS — M79.18 MYOFASCIAL PAIN: ICD-10-CM

## 2024-07-16 PROCEDURE — 97110 THERAPEUTIC EXERCISES: CPT | Mod: GP | Performed by: PHYSICAL THERAPIST

## 2024-07-16 NOTE — PATIENT INSTRUCTIONS
Try walking 5-10 minutes then see how 24 hours goes - then try biking for 5-10 minutes the next day and see how 24 hours goes - if feeling comfortable can alternate days of biking and walking and increase by 5 minutes at a time and work up to 20-30 minutes in a row of either exercise up to 5x/week

## 2024-07-19 NOTE — TELEPHONE ENCOUNTER
RECORDS RECEIVED FROM: Care Everywhere   REASON FOR VISIT: 6 month follow-up, transfer of care, Dr. Voss patient.   PROVIDER: Norbert Feliz MD   DATE OF APPT: 9/20/24 @ 2:15 pm    NOTES (FOR ALL VISITS) STATUS DETAILS   OFFICE NOTE from referring provider Internal 1/18/24, 11/30/23 Suly Voss MD @[facility]-Neuro     OFFICE NOTE from other specialist Care Everywhere 3/12/24 Terence Anderson MD @[facility]    2/26/24 Kimberlyn Hicks PA-C @[facility]Spine & NeuroSurg    2/13/24 Charlotte Camacho MD @[facility]-NeuroSurg    6/27/23 (Phone Note), 4/3/23, 10/6/22, 4/11/22 John Reyes MD @[facility]-Ortho     12/26/22, 11/1/21 David Mejias MD @[facility]      MEDICATION LIST Internal    IMAGING  (FOR ALL VISITS)     MRI (HEAD, NECK, SPINE) Internal [facility]  12/23/23 MR Brain  12/23/23 MR Cervical Spine  4/3/23 MR Femur Thigh Left  10/6/22 MR Femur Thigh Left  4/11/22 MR Femur Thigh Left  11/8/21 MR Femur Thigh Left     [place]  12/5/22 MR Femur Left

## 2024-08-05 ENCOUNTER — THERAPY VISIT (OUTPATIENT)
Dept: PHYSICAL THERAPY | Facility: REHABILITATION | Age: 74
End: 2024-08-05
Payer: MEDICARE

## 2024-08-05 DIAGNOSIS — M47.812 FACET ARTHROPATHY, CERVICAL: ICD-10-CM

## 2024-08-05 DIAGNOSIS — M54.12 CERVICAL RADICULAR PAIN: Primary | ICD-10-CM

## 2024-08-05 DIAGNOSIS — M79.18 MYOFASCIAL PAIN: ICD-10-CM

## 2024-08-05 PROCEDURE — 97535 SELF CARE MNGMENT TRAINING: CPT | Mod: GP

## 2024-08-05 PROCEDURE — 97110 THERAPEUTIC EXERCISES: CPT | Mod: GP

## 2024-08-16 NOTE — PROGRESS NOTES
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  Neurologic exam actually interesting and not consistent with a peripheral nerve lesion we would suspect after surgery in the thigh. She had brisk reflexes suggesting an upper motor neuron sign therefore large workup was completed including MRI of the brain and cervical spine were completed.  MRI cervical spine showed advanced multilevel spondylosis, worst at C5-6, with moderate to severe canal stenosis and cord compression.  Today patient with continued symptoms of neck pain and intermittent sensory symptoms in bilateral upper extremities concerning for symptomatic cervical spine disease.  Discussed referral to neurosurgery for further evaluation of possible surgical intervention.    Also placed referral for physical therapy for lower back pain, as well as discussed topical creams for symptomatic relief.    Plan:  - Referral to neurosurgery for evaluation of cervical spine stenosis  - Referral to PT for lower back pain  - Lidocaine cream for lower back pain  - Follow up in 6 months    Patient was seen and discussed with Dr. Jones.    Suly Voss MD  Neurology Resident, PGY4  [facility]    _________________________________________    HPI:  Janey Bo is a  73 year old female who presents today for follow-up visit to discuss MRI results.    Today, patient reports symptoms are largely unchanged.  She is reporting having continued daily neck  Patient contacted intake department, stated she was returning a call regarding a message she received. Writer did not see any notation of a message being left, pt thought it may have to do with her NP appointment for TT. Writer informed pt a message will be send to the practice and if they needed to follow-up with pt they would reach out accordingly.    pain with limited range of motion in her neck.  Also with intermittent numbness and tingling in bilateral hands that spreads up to the level of her elbows.  She does have intermittent radicular symptoms from her neck.  She also details having pretty significant mid to low back pain, without radicular symptoms into her legs.  Most of the symptoms are unchanged since I saw patient last.  No new neurologic symptoms.      NEUROLOGIC EXAMINATION:  Vitals: /74 (BP Location: Left arm, Patient Position: Sitting, Cuff Size: Adult Regular)   Pulse 74   Ht 1.524 m (5')   Wt 77.1 kg (170 lb)   SpO2 96%   BMI 33.20 kg/m      Neurologic Exam:  Mental status: Patient is oriented to person, place and time and able to provide a detailed account of history of illness. Attention and fund of knowledge are normal. Speech is fluent; comprehension, repetition and naming are normal.     Cranial nerves: Pupils are round and react normally to light and accommodation. Visual fields are full and extraocular movements are normal. Facial strength and sensation are normal. Hearing is normal to conversation. Palate rises symmetrically and there is no dysarthria. Tongue protrusion is normal.     Motor/Strength: Strength is 5/5 throughout upper and lower extremity symptoms, aside from right hip flexor 4+/5.  Mild intention tremor.  Normal tone throughout upper and lower extremities.     Sensation: Sensation slightly diminished to pinprick on the lateral aspect of left foot otherwise normal throughout upper and lower extremities.  Vibratory sensation unremarkable.     Reflexes: Brisk biceps, brachioradialis, patella, achilles.  Negative Stone.  No clonus at ankles.     Coordination: Finger-to-nose are symmetric and normal bilaterally. Rapid alternating movements are symmetric in the extremities. No pronator drift.     Gait: Gait is narrow based and steady, though slightly hunched over.  Unable to walk in tandem or on heels or  toes.      INVESTIGATIONS:   MR CERVICAL SPINE W/O CONTRAST    IMPRESSION:  1.  Advanced multilevel spondylosis, worst at C5-C6 where there is moderate to severe canal stenosis with cord compression.  2.  Multilevel ventral cord flattening without abnormal cord signal.  3.  Foraminal stenoses, as above.    MR BRAIN W/O and W CONTRAST   1.  No acute intracranial process.  2.  Generalized brain atrophy and presumed microvascular ischemic changes as detailed above.    MR LUMBAR SPINE W/O CONTRAST   IMPRESSION:  1. Transitional anatomy at the lumbosacral junction, as described.  2. Multilevel degenerative changes, as described.  3. Moderate central spinal canal stenosis at L3-L4. Milder degrees of spinal canal narrowing elsewhere.  4. Varying degrees of multilevel degenerative neural foraminal stenosis, as described.  5. Mild multilevel degenerative spondylolisthesis.

## 2024-09-03 ENCOUNTER — THERAPY VISIT (OUTPATIENT)
Dept: PHYSICAL THERAPY | Facility: REHABILITATION | Age: 74
End: 2024-09-03
Payer: MEDICARE

## 2024-09-03 DIAGNOSIS — M54.12 CERVICAL RADICULAR PAIN: Primary | ICD-10-CM

## 2024-09-03 DIAGNOSIS — M47.812 FACET ARTHROPATHY, CERVICAL: ICD-10-CM

## 2024-09-03 DIAGNOSIS — M79.18 MYOFASCIAL PAIN: ICD-10-CM

## 2024-09-03 PROCEDURE — 97110 THERAPEUTIC EXERCISES: CPT | Mod: KX | Performed by: PHYSICAL THERAPIST

## 2024-09-03 NOTE — PATIENT INSTRUCTIONS
Dr. Som Kirby - phone number [phone] for appt for R toe  [address] - across from the [facility]      ADD - trying small head on massager for the sub-occipital area on the R upper neck   THEN  ADD chin tuck with bending chin in to chest to stretch the sub-occipitals x5-10 reps    Can try to do the wall stretch next to a doorway so you can reach further back through the doorway    Can try doing small standing side bends for helping to gentle stretch R lower back    X5-10 reps each side 1-2x/day

## 2024-09-20 ENCOUNTER — PRE VISIT (OUTPATIENT)
Dept: NEUROLOGY | Facility: CLINIC | Age: 74
End: 2024-09-20

## 2024-09-20 ENCOUNTER — OFFICE VISIT (OUTPATIENT)
Dept: NEUROLOGY | Facility: CLINIC | Age: 74
End: 2024-09-20
Payer: MEDICARE

## 2024-09-20 VITALS
RESPIRATION RATE: 16 BRPM | DIASTOLIC BLOOD PRESSURE: 74 MMHG | HEIGHT: 61 IN | HEART RATE: 63 BPM | BODY MASS INDEX: 35.08 KG/M2 | SYSTOLIC BLOOD PRESSURE: 120 MMHG | OXYGEN SATURATION: 93 % | WEIGHT: 185.8 LBS

## 2024-09-20 DIAGNOSIS — G57.02 NEUROPATHY, SCIATIC, LEFT: Primary | ICD-10-CM

## 2024-09-20 DIAGNOSIS — M48.02 CERVICAL STENOSIS OF SPINAL CANAL: ICD-10-CM

## 2024-09-20 DIAGNOSIS — R93.0 ABNORMAL MRI OF HEAD: ICD-10-CM

## 2024-09-20 PROCEDURE — 99417 PROLNG OP E/M EACH 15 MIN: CPT | Performed by: INTERNAL MEDICINE

## 2024-09-20 PROCEDURE — 99215 OFFICE O/P EST HI 40 MIN: CPT | Performed by: INTERNAL MEDICINE

## 2024-09-20 RX ORDER — GABAPENTIN 300 MG/1
300 CAPSULE ORAL DAILY PRN
Qty: 90 CAPSULE | Refills: 3 | Status: SHIPPED | OUTPATIENT
Start: 2024-09-20

## 2024-09-20 RX ORDER — GABAPENTIN 300 MG/1
300 CAPSULE ORAL 2 TIMES DAILY
Qty: 180 CAPSULE | Refills: 3 | Status: SHIPPED | OUTPATIENT
Start: 2024-09-20

## 2024-09-20 RX ORDER — LORAZEPAM 1 MG/1
2 TABLET ORAL EVERY MORNING
COMMUNITY
Start: 2024-05-02

## 2024-09-20 RX ORDER — HYDROCORTISONE 2.5 %
CREAM (GRAM) TOPICAL PRN
COMMUNITY
Start: 2024-04-03

## 2024-09-20 RX ORDER — ESCITALOPRAM OXALATE 20 MG/1
20 TABLET ORAL DAILY
COMMUNITY
Start: 2024-08-20

## 2024-09-20 RX ORDER — KETOCONAZOLE 20 MG/G
CREAM TOPICAL PRN
COMMUNITY
Start: 2024-04-03

## 2024-09-20 RX ORDER — NAPROXEN SODIUM 220 MG
220 TABLET ORAL EVERY EVENING
COMMUNITY

## 2024-09-20 RX ORDER — DILTIAZEM HYDROCHLORIDE 180 MG/1
180 CAPSULE, COATED, EXTENDED RELEASE ORAL EVERY MORNING
COMMUNITY
Start: 2024-08-27

## 2024-09-20 ASSESSMENT — PAIN SCALES - GENERAL: PAINLEVEL: MILD PAIN (2)

## 2024-09-20 NOTE — PROGRESS NOTES
"[facility] Neurology Consultation    Janey Bo MRN# [id number]   Age: 74 year old YOB: 1950     Requesting physician: No ref. provider found  David Mejias     Reason for Consultation: extremity pains/paresthesias, RLS, and imbalance           Assessment and Plan:   Assessment:  Janey Bo is a 74 year old female who presents today for evaluation of extremity pains/paresthesias, RLS, and imbalance. 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 This pain and RLS is generally adequately controlled on gabapentin 600 mg nightly. Daily prn dosage was also added today. Patient is following with other providers for cervical radiculopathy. Imbalance is likely largely related to orthopedic issues. She is hyper-reflexic, but I have lower concern for cervical central canal spinal stenosis to be symptomatic at this time.      Plan:  - Continue gabapentin 600 mg nightly and 300 mg daily prn    Follow up in Neurology clinic in 1 year or earlier as needed should new concerns arise.    Norbert Feliz MD   of Neurology  [facility]  ---------------------------------------------------------------------------------------------------------------------------        History of Presenting Symptoms:   Janey Bo is a 74 year old female who presents today for evaluation of extremity pains/paresthesias and imbalance.     She has been working with physical therapy for the neck, which has been helpful. She had an ablation on the neck, which she thinks helps too.     She has pain on the back of the left thigh with radiating pains down the leg. She sometimes gets numbness/tingling as well. She takes 2 gabapentin (600 mg) at night. It helps her sleep and reduces the left leg pain. She gets swelling in the left leg since surgery to remove tumor.     Her balance is \"not good\". She fell " about 3 weeks ago in [employer] and hit her head. She didn't lose consciousness. It has been getting worse.     Restless leg syndrome is doing better with gabapentin.       Past Medical History:     Patient Active Problem List   Diagnosis    Hypothyroidism    Thyroid cancer (H)    Abnormal glucose    Soft tissue mass    Benign lipomatous neoplasm of skin and subcutaneous tissue of trunk    Anemia    Gastritis    Encounter for other preprocedural examination    Tachycardia    Tremor    Non morbid obesity due to excess calories    Clostridium difficile diarrhea    Essential hypertension    Malignant neoplasm of thyroid gland (H)    Complications of medical care    Migraine    Other premature beats    Palpitations    Prediabetes    Atypical lipoma of soft tissue    Adhesive capsulitis of right shoulder    Biceps tendinitis of right upper extremity    Hallux limitus of left foot    Hammertoe of left foot    Impingement syndrome of right shoulder    Tendinopathy of right rotator cuff    Liposarcoma of left thigh (H)    Metatarsalgia of left foot    Osteopenia    Preop examination    Primary osteoarthritis, right shoulder    Type 2 diabetes mellitus without complication, without long-term current use of insulin (H)    Cervical radicular pain    Facet arthropathy, cervical    Myofascial pain     Past Medical History:   Diagnosis Date    Anxiety     Arrhythmia     Breast cancer (H)     Carpal tunnel syndrome     Chronic osteoarthritis     Depressive disorder     Diabetes (H) 2018    Edema     Fatigue     Gastro-oesophageal reflux disease     Heart murmur     Hyperlipaemia     Migraines     Osteoarthrosis     Palpitations     Pancreatic cancer (H)     Pancreatic disease     PONV (postoperative nausea and vomiting)     PVC's (premature ventricular contractions)     Scoliosis     Shortness of breath     Thyroid disease     Hypothyroidsim    Uncomplicated asthma     Exercise induced.         Past Surgical History:     Past Surgical  History:   Procedure Laterality Date    APPENDECTOMY      CHOLECYSTECTOMY      COLONOSCOPY      EXCISE MASS LOWER EXTREMITY Left 11/7/2014    Procedure: EXCISE MASS LOWER EXTREMITY;  Surgeon: John Reyes MD;  Location: [facility] OR    EXCISE VILLALOBOS'S NEUROMA FOOT      EXCISE SOFT TISSUE TUMOR THIGH Left 1/20/2017    Procedure: EXCISE SOFT TISSUE TUMOR THIGH;  Surgeon: John Reyes MD;  Location: [facility] OR    EXCISE SOFT TISSUE TUMOR THIGH Left 4/16/2021    Procedure: Excision of intraneural lipomatous tumor Left posterior thigh;  Surgeon: John Reyes MD;  Location: [facility] OR    INJECT STEROID (LOCATION) Left 4/16/2021    Procedure: Left shoulder subacromial bursa steroid injection;  Surgeon: John Reyes MD;  Location: [facility] OR    INNER EAR SURGERY      LUMPECTOMY BREAST      OOPHORECTOMY      partial hysterectomy      thyroidectomy      TONSILLECTOMY & ADENOIDECTOMY      WHIPPLE PROCEDURE      ZZ GASTROSCOPY,FL      ZZC HAND/FINGER SURGERY UNLISTED      ZZC STOMACH SURGERY PROCEDURE UNLISTED          Social History:     Social History     Tobacco Use    Smoking status: Former     Current packs/day: 1.50     Average packs/day: 1.5 packs/day for 15.0 years (22.5 ttl pk-yrs)     Types: Cigarettes    Smokeless tobacco: Never    Tobacco comments:     quit 30 yrs ago   Substance Use Topics    Alcohol use: No    Drug use: No        Family History:     Family History   Problem Relation Age of Onset    Asthma Paternal Grandfather     Obesity Paternal Grandfather     Colon Cancer Paternal Grandfather     Cancer Paternal Grandfather     Other Cancer Paternal Grandfather     Diabetes Father     Hypertension Father     Osteoporosis Father     Cerebrovascular Disease Father     Thyroid Disease Father     Obesity Father     Other Cancer Father     Migraines Father     Hypothyroidism Father     Spine Problems Father     Diabetes Paternal Grandmother     Osteoporosis Paternal Grandmother     Thyroid Disease Paternal Grandmother      Hypothyroidism Paternal Grandmother     Low Back Problems Paternal Grandmother     Spine Problems Paternal Grandmother     Osteoporosis Mother     Cerebrovascular Disease Mother     Thyroid Disease Mother     Deep Vein Thrombosis Mother     Hypothyroidism Mother     Spine Problems Mother     Low Back Problems Mother      () Other         Medications:     Current Outpatient Medications   Medication Sig Dispense Refill    albuterol (PROAIR HFA/PROVENTIL HFA/VENTOLIN HFA) 108 (90 Base) MCG/ACT inhaler Inhale 2 puffs into the lungs every 6 hours as needed       amylase-lipase-protease (CREON 24) 11200-45071 units CPEP per EC capsule Take 1 capsule by mouth as needed       aspirin 81 MG tablet Take 1 tablet by mouth daily       biotin 5 MG CAPS 5 mg       buPROPion (WELLBUTRIN SR) 200 MG 12 hr tablet Take 2 tablets by mouth every morning       buPROPion (WELLBUTRIN) 100 MG tablet       butalbital-aspirin-caffeine (FIORINAL) -40 MG capsule       calcium carbonate 750 MG CHEW Take 750 mg by mouth daily      Calcium-Magnesium-Vitamin D (CALCIUM MAGNESIUM PO) Take 600 mg by mouth      cholecalciferol (VITAMIN D3) 10 mcg (400 units) TABS tablet Take 400 Units by mouth      diltiazem ER (DILT-XR) 180 MG 24 hr capsule daily      DilTIAZem HCl Coated Beads (DILTIAZEM CD PO) Take by mouth every morning       escitalopram (LEXAPRO) 10 MG tablet Take by mouth every evening       ferrous sulfate (FEROSUL) 325 (65 Fe) MG tablet Take 325 mg by mouth daily (with breakfast)      gabapentin (NEURONTIN) 300 MG capsule Take 1 capsule (300 mg) by mouth 2 times daily 60 capsule 0    hydrOXYzine (ATARAX) 10 MG tablet Take 10 mg by mouth      hydrOXYzine (ATARAX) 10 MG tablet Take 10 mg by mouth      ibuprofen (ADVIL/MOTRIN) 200 MG capsule Take 400 mg by mouth every 4 hours as needed for fever      levothyroxine (SYNTHROID/LEVOTHROID) 150 MCG tablet Take 150 mcg by mouth      levothyroxine (SYNTHROID/LEVOTHROID) 150 MCG tablet Take 150  "mcg by mouth every morning       lidocaine (LMX4) 4 % external cream Apply topically once as needed for mild pain 15 g 3    methylphenidate (RITALIN) 10 MG tablet every morning       methylphenidate (RITALIN) 20 MG tablet       metroNIDAZOLE (METROGEL) 0.75 % external gel At Bedtime      potassium chloride ER (K-TAB/KLOR-CON) 10 MEQ CR tablet       potassium chloride ER (KLOR-CON M) 20 MEQ CR tablet Take 20 mEq by mouth every morning       Probiotic Product (ALIGN) CAPS Take 4 mg by mouth 2 times daily       raloxifene (EVISTA) 60 MG tablet Take 60 mg by mouth every morning       sucralfate (CARAFATE) 1 GM/10ML suspension Take 1,000 mg by mouth as needed      tiZANidine (ZANAFLEX) 4 MG tablet Take 4 mg by mouth      VITAMIN E BLEND PO        No current facility-administered medications for this visit.        Allergies:     Allergies   Allergen Reactions    Black [place] Pollen Allergy Skin Test Anaphylaxis    Nuts Anaphylaxis     thickened throat    Pineapple Anaphylaxis and Other (See Comments)     Thickening in throat  thickened throat      [place] Flavor Other (See Comments)     thickened throat  thickened throat    Liquid Adhesive Other (See Comments)     Tears my skin off    Morphine Itching and Other (See Comments)     Itchiness      Morphine Hcl Itching    Adhesive Tape Rash     Tears my skin off        Review of Systems:   As above      Physical Exam:   Vitals: /74 (BP Location: Right arm, Patient Position: Sitting, Cuff Size: Adult Regular)   Pulse 63   Resp 16   Ht 1.537 m (5' 0.5\")   Wt 84.3 kg (185 lb 12.8 oz)   SpO2 93%   BMI 35.69 kg/m     General: Seated comfortably in no acute distress.  Heart: Regular rate  Neurologic:     Mental Status: Fully alert, attentive. Language normal, speech clear and fluent, no paraphasic errors.      Cranial Nerves: Visual fields intact. PERRL. EOMI with normal smooth pursuit. Facial sensation intact/symmetric. Facial movements symmetric. Hearing not " formally tested but intact to conversation. Palate elevation symmetric, uvula midline. No dysarthria. Shoulder shrug strong bilaterally. Tongue protrusion midline.     Motor: No tremors or other abnormal movements observed. Muscle tone normal throughout. Normal/symmetric rapid finger tapping. Strength 5/5 throughout upper and lower extremities.      Right Left   Shoulder abduction        5 5   Elbow extension 5 5   Elbow flexion 5 5   Wrist extension         5 5   Finger extension 5 5   ADM 5 5   FDI 5 5   APB 5 5   Hip flexion 5 5   Knee flexion 5 5   Knee extension 5 5   Dorsiflexion 5 5   Plantar flexion 5 5        Deep Tendon Reflexes:     Right Left   Biceps 3 3   BRD 3 3   Triceps 3 3   Patellar 3 3   Achilles 2 1   Plantar Flexor Flexor   Clonus Absent Absent        Sensory: With exception of decreased temperature sensation in left foot, intact/symmetric to light touch, temperature, vibration and proprioception throughout upper and lower extremities. Vibration is ~8 seconds in the right great toe and ~5 seconds in the left great toe. Negative Romberg.      Coordination: Finger-nose-finger and heel-shin intact without dysmetria.      Gait: Steady casual gait. Mild to moderate issues with heel and tandem gait. Not able to walk on toe in the setting of prior toe surgeries.          Data: Pertinent prior to visit   Imaging:  MR LEFT FEMUR WITH AND WITHOUT CONTRAST 4/3/2023   IMPRESSION: Stable resection changes of atypical lipomatous tumor in  the posterior left thigh with similar appearing fat intensity lesion  interspersed with left sciatic nerve.    MR CERVICAL SPINE W/O CONTRAST    IMPRESSION:  1.  Advanced multilevel spondylosis, worst at C5-C6 where there is moderate to severe canal stenosis with cord compression.  2.  Multilevel ventral cord flattening without abnormal cord signal.  3.  Foraminal stenoses, as above.     MR BRAIN W/O and W CONTRAST   1.  No acute intracranial process.  2.  Generalized brain  atrophy and presumed microvascular ischemic changes as detailed above.     MR LUMBAR SPINE W/O CONTRAST   IMPRESSION:  1. Transitional anatomy at the lumbosacral junction, as described.  2. Multilevel degenerative changes, as described.  3. Moderate central spinal canal stenosis at L3-L4. Milder degrees of spinal canal narrowing elsewhere.  4. Varying degrees of multilevel degenerative neural foraminal stenosis, as described.  5. Mild multilevel degenerative spondylolisthesis.    Procedures:  EMG 3/2024  Interpretation:  This is an abnormal study.  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 There is no compelling electrophysiologic evidence of a superimposed left lumbosacral radiculopathy in the current study.       The total time of this encounter today amounted to 57 minutes. This time included time spent with the patient, prep work, ordering tests, and performing post visit documentation.

## 2024-09-20 NOTE — LETTER
9/20/2024       RE: Janey Bo  [address] [facility] [place] [address]     Dear Colleague,    Thank you for referring your patient, Janey Bo, to the [facility]. Please see a copy of my visit note below.    [facility] Neurology Consultation    Janey Bo MRN# [id number]   Age: 74 year old YOB: 1950     Requesting physician: No ref. provider found  David Mejias     Reason for Consultation: extremity pains/paresthesias, RLS, and imbalance           Assessment and Plan:   Assessment:  Janey Bo is a 74 year old female who presents today for evaluation of extremity pains/paresthesias, RLS, and imbalance. 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 This pain and RLS is generally adequately controlled on gabapentin 600 mg nightly. Daily prn dosage was also added today. Patient is following with other providers for cervical radiculopathy. Imbalance is likely largely related to orthopedic issues. She is hyper-reflexic, but I have lower concern for cervical central canal spinal stenosis to be symptomatic at this time.      Plan:  - Continue gabapentin 600 mg nightly and 300 mg daily prn    Follow up in Neurology clinic in 1 year or earlier as needed should new concerns arise.    Norbert Feliz MD   of Neurology  [facility]  ---------------------------------------------------------------------------------------------------------------------------        History of Presenting Symptoms:   Janey Bo is a 74 year old female who presents today for evaluation of extremity pains/paresthesias and imbalance.     She has been working with physical therapy for the neck, which has been helpful. She had an ablation on the neck, which she thinks helps  "too.     She has pain on the back of the left thigh with radiating pains down the leg. She sometimes gets numbness/tingling as well. She takes 2 gabapentin (600 mg) at night. It helps her sleep and reduces the left leg pain. She gets swelling in the left leg since surgery to remove tumor.     Her balance is \"not good\". She fell about 3 weeks ago in [employer] and hit her head. She didn't lose consciousness. It has been getting worse.     Restless leg syndrome is doing better with gabapentin.       Past Medical History:     Patient Active Problem List   Diagnosis     Hypothyroidism     Thyroid cancer (H)     Abnormal glucose     Soft tissue mass     Benign lipomatous neoplasm of skin and subcutaneous tissue of trunk     Anemia     Gastritis     Encounter for other preprocedural examination     Tachycardia     Tremor     Non morbid obesity due to excess calories     Clostridium difficile diarrhea     Essential hypertension     Malignant neoplasm of thyroid gland (H)     Complications of medical care     Migraine     Other premature beats     Palpitations     Prediabetes     Atypical lipoma of soft tissue     Adhesive capsulitis of right shoulder     Biceps tendinitis of right upper extremity     Hallux limitus of left foot     Hammertoe of left foot     Impingement syndrome of right shoulder     Tendinopathy of right rotator cuff     Liposarcoma of left thigh (H)     Metatarsalgia of left foot     Osteopenia     Preop examination     Primary osteoarthritis, right shoulder     Type 2 diabetes mellitus without complication, without long-term current use of insulin (H)     Cervical radicular pain     Facet arthropathy, cervical     Myofascial pain     Past Medical History:   Diagnosis Date     Anxiety      Arrhythmia      Breast cancer (H)      Carpal tunnel syndrome      Chronic osteoarthritis      Depressive disorder      Diabetes (H) 2018     Edema      Fatigue      Gastro-oesophageal reflux disease      Heart murmur      " Hyperlipaemia      Migraines      Osteoarthrosis      Palpitations      Pancreatic cancer (H)      Pancreatic disease      PONV (postoperative nausea and vomiting)      PVC's (premature ventricular contractions)      Scoliosis      Shortness of breath      Thyroid disease     Hypothyroidsim     Uncomplicated asthma     Exercise induced.         Past Surgical History:     Past Surgical History:   Procedure Laterality Date     APPENDECTOMY       CHOLECYSTECTOMY       COLONOSCOPY       EXCISE MASS LOWER EXTREMITY Left 11/7/2014    Procedure: EXCISE MASS LOWER EXTREMITY;  Surgeon: John Reyes MD;  Location: [facility]     EXCISE VILLALOBOS'S NEUROMA FOOT       EXCISE SOFT TISSUE TUMOR THIGH Left 1/20/2017    Procedure: EXCISE SOFT TISSUE TUMOR THIGH;  Surgeon: John Reyes MD;  Location: [facility] OR     EXCISE SOFT TISSUE TUMOR THIGH Left 4/16/2021    Procedure: Excision of intraneural lipomatous tumor Left posterior thigh;  Surgeon: John Reyes MD;  Location: [facility] OR     INJECT STEROID (LOCATION) Left 4/16/2021    Procedure: Left shoulder subacromial bursa steroid injection;  Surgeon: John Reyes MD;  Location: [facility] OR     INNER EAR SURGERY       LUMPECTOMY BREAST       OOPHORECTOMY       partial hysterectomy       thyroidectomy       TONSILLECTOMY & ADENOIDECTOMY       WHIPPLE PROCEDURE       ZZ GASTROSCOPY,FL       ZZC HAND/FINGER SURGERY UNLISTED       ZZC STOMACH SURGERY PROCEDURE UNLISTED          Social History:     Social History     Tobacco Use     Smoking status: Former     Current packs/day: 1.50     Average packs/day: 1.5 packs/day for 15.0 years (22.5 ttl pk-yrs)     Types: Cigarettes     Smokeless tobacco: Never     Tobacco comments:     quit 30 yrs ago   Substance Use Topics     Alcohol use: No     Drug use: No        Family History:     Family History   Problem Relation Age of Onset     Asthma Paternal Grandfather      Obesity Paternal Grandfather      Colon Cancer Paternal Grandfather      Cancer  Paternal Grandfather      Other Cancer Paternal Grandfather      Diabetes Father      Hypertension Father      Osteoporosis Father      Cerebrovascular Disease Father      Thyroid Disease Father      Obesity Father      Other Cancer Father      Migraines Father      Hypothyroidism Father      Spine Problems Father      Diabetes Paternal Grandmother      Osteoporosis Paternal Grandmother      Thyroid Disease Paternal Grandmother      Hypothyroidism Paternal Grandmother      Low Back Problems Paternal Grandmother      Spine Problems Paternal Grandmother      Osteoporosis Mother      Cerebrovascular Disease Mother      Thyroid Disease Mother      Deep Vein Thrombosis Mother      Hypothyroidism Mother      Spine Problems Mother      Low Back Problems Mother       () Other         Medications:     Current Outpatient Medications   Medication Sig Dispense Refill     albuterol (PROAIR HFA/PROVENTIL HFA/VENTOLIN HFA) 108 (90 Base) MCG/ACT inhaler Inhale 2 puffs into the lungs every 6 hours as needed        amylase-lipase-protease (CREON 24) 02281-06010 units CPEP per EC capsule Take 1 capsule by mouth as needed        aspirin 81 MG tablet Take 1 tablet by mouth daily        biotin 5 MG CAPS 5 mg        buPROPion (WELLBUTRIN SR) 200 MG 12 hr tablet Take 2 tablets by mouth every morning        buPROPion (WELLBUTRIN) 100 MG tablet        butalbital-aspirin-caffeine (FIORINAL) -40 MG capsule        calcium carbonate 750 MG CHEW Take 750 mg by mouth daily       Calcium-Magnesium-Vitamin D (CALCIUM MAGNESIUM PO) Take 600 mg by mouth       cholecalciferol (VITAMIN D3) 10 mcg (400 units) TABS tablet Take 400 Units by mouth       diltiazem ER (DILT-XR) 180 MG 24 hr capsule daily       DilTIAZem HCl Coated Beads (DILTIAZEM CD PO) Take by mouth every morning        escitalopram (LEXAPRO) 10 MG tablet Take by mouth every evening        ferrous sulfate (FEROSUL) 325 (65 Fe) MG tablet Take 325 mg by mouth daily (with breakfast)        gabapentin (NEURONTIN) 300 MG capsule Take 1 capsule (300 mg) by mouth 2 times daily 60 capsule 0     hydrOXYzine (ATARAX) 10 MG tablet Take 10 mg by mouth       hydrOXYzine (ATARAX) 10 MG tablet Take 10 mg by mouth       ibuprofen (ADVIL/MOTRIN) 200 MG capsule Take 400 mg by mouth every 4 hours as needed for fever       levothyroxine (SYNTHROID/LEVOTHROID) 150 MCG tablet Take 150 mcg by mouth       levothyroxine (SYNTHROID/LEVOTHROID) 150 MCG tablet Take 150 mcg by mouth every morning        lidocaine (LMX4) 4 % external cream Apply topically once as needed for mild pain 15 g 3     methylphenidate (RITALIN) 10 MG tablet every morning        methylphenidate (RITALIN) 20 MG tablet        metroNIDAZOLE (METROGEL) 0.75 % external gel At Bedtime       potassium chloride ER (K-TAB/KLOR-CON) 10 MEQ CR tablet        potassium chloride ER (KLOR-CON M) 20 MEQ CR tablet Take 20 mEq by mouth every morning        Probiotic Product (ALIGN) CAPS Take 4 mg by mouth 2 times daily        raloxifene (EVISTA) 60 MG tablet Take 60 mg by mouth every morning        sucralfate (CARAFATE) 1 GM/10ML suspension Take 1,000 mg by mouth as needed       tiZANidine (ZANAFLEX) 4 MG tablet Take 4 mg by mouth       VITAMIN E BLEND PO        No current facility-administered medications for this visit.        Allergies:     Allergies   Allergen Reactions     Black [place] Pollen Allergy Skin Test Anaphylaxis     Nuts Anaphylaxis     thickened throat     Pineapple Anaphylaxis and Other (See Comments)     Thickening in throat  thickened throat       [place] Flavor Other (See Comments)     thickened throat  thickened throat     Liquid Adhesive Other (See Comments)     Tears my skin off     Morphine Itching and Other (See Comments)     Itchiness       Morphine Hcl Itching     Adhesive Tape Rash     Tears my skin off        Review of Systems:   As above      Physical Exam:   Vitals: /74 (BP Location: Right arm, Patient Position: Sitting, Cuff  "Size: Adult Regular)   Pulse 63   Resp 16   Ht 1.537 m (5' 0.5\")   Wt 84.3 kg (185 lb 12.8 oz)   SpO2 93%   BMI 35.69 kg/m     General: Seated comfortably in no acute distress.  Heart: Regular rate  Neurologic:     Mental Status: Fully alert, attentive. Language normal, speech clear and fluent, no paraphasic errors.      Cranial Nerves: Visual fields intact. PERRL. EOMI with normal smooth pursuit. Facial sensation intact/symmetric. Facial movements symmetric. Hearing not formally tested but intact to conversation. Palate elevation symmetric, uvula midline. No dysarthria. Shoulder shrug strong bilaterally. Tongue protrusion midline.     Motor: No tremors or other abnormal movements observed. Muscle tone normal throughout. Normal/symmetric rapid finger tapping. Strength 5/5 throughout upper and lower extremities.      Right Left   Shoulder abduction        5 5   Elbow extension 5 5   Elbow flexion 5 5   Wrist extension         5 5   Finger extension 5 5   ADM 5 5   FDI 5 5   APB 5 5   Hip flexion 5 5   Knee flexion 5 5   Knee extension 5 5   Dorsiflexion 5 5   Plantar flexion 5 5        Deep Tendon Reflexes:     Right Left   Biceps 3 3   BRD 3 3   Triceps 3 3   Patellar 3 3   Achilles 2 1   Plantar Flexor Flexor   Clonus Absent Absent        Sensory: With exception of decreased temperature sensation in left foot, intact/symmetric to light touch, temperature, vibration and proprioception throughout upper and lower extremities. Vibration is ~8 seconds in the right great toe and ~5 seconds in the left great toe. Negative Romberg.      Coordination: Finger-nose-finger and heel-shin intact without dysmetria.      Gait: Steady casual gait. Mild to moderate issues with heel and tandem gait. Not able to walk on toe in the setting of prior toe surgeries.          Data: Pertinent prior to visit   Imaging:  MR LEFT FEMUR WITH AND WITHOUT CONTRAST 4/3/2023   IMPRESSION: Stable resection changes of atypical lipomatous tumor " in  the posterior left thigh with similar appearing fat intensity lesion  interspersed with left sciatic nerve.    MR CERVICAL SPINE W/O CONTRAST    IMPRESSION:  1.  Advanced multilevel spondylosis, worst at C5-C6 where there is moderate to severe canal stenosis with cord compression.  2.  Multilevel ventral cord flattening without abnormal cord signal.  3.  Foraminal stenoses, as above.     MR BRAIN W/O and W CONTRAST   1.  No acute intracranial process.  2.  Generalized brain atrophy and presumed microvascular ischemic changes as detailed above.     MR LUMBAR SPINE W/O CONTRAST   IMPRESSION:  1. Transitional anatomy at the lumbosacral junction, as described.  2. Multilevel degenerative changes, as described.  3. Moderate central spinal canal stenosis at L3-L4. Milder degrees of spinal canal narrowing elsewhere.  4. Varying degrees of multilevel degenerative neural foraminal stenosis, as described.  5. Mild multilevel degenerative spondylolisthesis.    Procedures:  EMG 3/2024  Interpretation:  This is an abnormal study.  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 There is no compelling electrophysiologic evidence of a superimposed left lumbosacral radiculopathy in the current study.       The total time of this encounter today amounted to 57 minutes. This time included time spent with the patient, prep work, ordering tests, and performing post visit documentation.      Again, thank you for allowing me to participate in the care of your patient.      Sincerely,    Norbert Feliz MD

## 2024-09-20 NOTE — NURSING NOTE
"Chief Complaint   Patient presents with    New Patient     /74 (BP Location: Right arm, Patient Position: Sitting, Cuff Size: Adult Regular)   Pulse 63   Resp 16   Ht 1.537 m (5' 0.5\")   Wt 84.3 kg (185 lb 12.8 oz)   SpO2 93%   BMI 35.69 kg/m      YEN DIAZ    "

## 2024-09-30 ENCOUNTER — THERAPY VISIT (OUTPATIENT)
Dept: PHYSICAL THERAPY | Facility: REHABILITATION | Age: 74
End: 2024-09-30
Payer: MEDICARE

## 2024-09-30 DIAGNOSIS — M54.12 CERVICAL RADICULAR PAIN: Primary | ICD-10-CM

## 2024-09-30 DIAGNOSIS — M79.18 MYOFASCIAL PAIN: ICD-10-CM

## 2024-09-30 DIAGNOSIS — M47.812 FACET ARTHROPATHY, CERVICAL: ICD-10-CM

## 2024-09-30 PROCEDURE — 97110 THERAPEUTIC EXERCISES: CPT | Mod: GP | Performed by: PHYSICAL THERAPIST

## 2024-10-01 NOTE — PROGRESS NOTES
[facility]                                                                                   OUTPATIENT PHYSICAL THERAPY    PLAN OF TREATMENT FOR OUTPATIENT REHABILITATION   Patient's Last Name, First Name, Janey Persaud YOB: 1950   Provider's Name   [facility]   Medical Record No.  [id number]     Onset Date: 02/26/24  Start of Care Date: 03/28/24     Medical Diagnosis:  Cervical radicular pain (M54.12)    Facet arthropathy, cervical (M47.812)    Myofascial pain (M79.18)      PT Treatment Diagnosis:  Cervical radicular pain (M54.12)    Facet arthropathy, cervical (M47.812)    Myofascial pain (M79.18) Plan of Treatment  Frequency/Duration: 1x/every 1-2 weeks/ 90 days    Certification date from 09/22/24 to 12/20/24         See note for plan of treatment details and functional goals     Kiki Troncoso, PT , DPT, CLT                        I CERTIFY THE NEED FOR THESE SERVICES FURNISHED UNDER        THIS PLAN OF TREATMENT AND WHILE UNDER MY CARE     (Physician attestation of this document indicates review and certification of the therapy plan).                Initial Assessment  See Epic Evaluation- Start of Care Date: 03/28/24    Beginning/End Dates of Progress Note Reporting Period:  03/28/24 to 09/30/2024    Referring Provider:  Kimberlyn Hicks PA-C       09/30/24 0500   Appointment Info   Signing clinician's name / credentials Kiki Troncoso PT, DPT, CLT   Total/Authorized Visits 22   Visits Used 14   Medical Diagnosis Cervical radicular pain (M54.12)    Facet arthropathy, cervical (M47.812)    Myofascial pain (M79.18)   PT Tx Diagnosis Cervical radicular pain (M54.12)    Facet arthropathy, cervical (M47.812)    Myofascial pain (M79.18)   Progress Note/Certification   Start of Care Date 03/28/24   Onset of illness/injury or Date of Surgery 02/26/24   Therapy Frequency 1x/every 1-2 weeks   Predicted Duration 90 days    Certification date from 09/22/24   Certification date to 12/20/24   Progress Note Due Date 12/20/24   Progress Note Completed Date 03/28/24   GOALS   PT Goals 2;3   PT Goal 1   Goal Description Pt will be able to report not having daily headaches for improved quality of life in 90 days.   Goal Progress from last visit IMPROVING - pt reports headaches have been pretty good this past month   [place] Date 12/20/24   PT Goal 2   Goal Description Pt will be able to perform ADLs with R arm reaching overhead without increase in neck pain for improved QOL in 90 days.   Goal Progress IMPROVING - able to reach almost all directions without difficulty except sometimes with arm out to the side   [place] Date 12/20/24   Subjective Report   Subjective Report Pt reports she fell at [employer] - had bent forward to get something low to the floor and then went to stand back up and fell over backwards and hit her head and hurt her neck. Pt continued to do neck ROM exercises and did icing for 2 days. Pt also fell at home - tipped backwards in a yard chair that was on the edge of sidewal - and pt hit her head and neck again. She continues to work on her exercises for her neck and keep her flexibility in her neck going - despite having more soreness. Pt reports if she does her neck exercises before bed it helps her fall asleep easier. Pt reports she also got an injection for her R 1st toe MTP and that has helped but still sore. Pt will be also having her R 4th finger PIP jt surgery for a ganglion cyst in the way. Pain rating for neck 2-3/10 when she turns to the R. Pt reports her back has been more sore. Pt has been wearing her fitbit and has noticed that her heart rate will increase to around 120 when she walks through her yard - feels like she gets short of breath easy.   Objective Measures   Objective Measures Objective Measure 1;Objective Measure 2;Objective Measure 3;Objective Measure 4   Objective Measure 1   Objective Measure ROM    Details Cervical flex 54 degrees at comfortable level with mostly full range - hitting chest wall today (was 35-50 degrees), ext 40 degrees with tightness all across the back of upper back (was 22-45 degrees with pain), R rotation 55 degrees with minimal pain end range (was 35-60 with pain) and L rotation 58 degrees with tightness (was 32 degrees)   Objective Measure 2   Objective Measure Observation - from last visit   Details Slightly more tight with thoracic R rotation stretch standing today versus L, able to do aerobic exercise without SOB and HR mid to upper 70s   Objective Measure 3   Objective Measure Strength - from last visit   Details B UE grossly 5/5 (had rotator cuff weakness initially), B LE grossly 5/5 except L hip flexor 4/5   Objective Measure 4   Objective Measure 2 minute walk test - from last visit   Details 3/10 RPE - 346 ft (age/gender norm 462 ft)   Treatment Interventions (PT)   Interventions Therapeutic Procedure/Exercise;Neuromuscular Re-education;Self Care/Home Management   Therapeutic Procedure/Exercise   Therapeutic Procedures: strength, endurance, ROM, flexibility minutes (54683) 54   Therapeutic Procedures Ther Proc 2;Ther Proc 3;Ther Proc 4;Ther Proc 5;Ther Proc 6   Ther Proc 1 Hip Hinge Bar Slide Down Thighs   Ther Proc 1 - Details x10 reps with cues for technique   Ther Proc 2 S/L bow and arrow stretch   Ther Proc 2 - Details performed s/l on plinth B   Ther Proc 3 TrA engagement   Ther Proc 3 - Details cont   Ther Proc 4 Cervical Retraction with Resistance   Ther Proc 4 - Details L2 band x10 reps   PTRx Ther Proc 1 Shoulder Theraband Rows   PTRx Ther Proc 1 - Details DO ELBOWS STRAIGHT and TRY TO pull front of shoulders back   PTRx Ther Proc 2 Paloff Press - Rotation Variation   PTRx Ther Proc 2 - Details 1st version - single band - arms by stomach Next harder version - single band with arms out in front next harder version - double band strands with arms by stomach hardest  version - double band strands with arms out in front! x10 reps each version B   PTRx Ther Proc 3 Cervical ROM   PTRx Ther Proc 3 - Details Rotation x10 reps B   PTRx Ther Proc 4 Trunk ROM   PTRx Ther Proc 4 - Details flex x2 reps standing, x5 reps seated, B SB x5 reps standing   PTRx Ther Proc 5 Scapular Retraction/Depression   PTRx Ther Proc 5 - Details performed x5 reps - cont   Skilled Intervention Reassessed response to HEP and activity level over past 4 weeks. Reassessed cervical ROM and discussed slight increase in R sided tightness likely due to her 2 falls. Reviewed whole HEP, performed and added to HEP per PTRx and printed for home; cues for good form and muscle engagement.   Patient Response/Progress Stretches continue to feel good   Ther Proc 5 Seated trunk flexion/extension   Ther Proc 5 - Details discussed returning to fully upright for improvement to trunk strength x5 reps   Ther Proc 6 Lev scap stretch R   Ther Proc 6 - Details x30 seconds x3 reps   Plan   Home program HEP - Neck flexion, SB and rotation, lev scap stretch, chin tuck with L2 band, scap retraction with rows, seated trunk flex, rotation and SB seated, 1st toe flex/ext, standing thoracic rotation with arm reach, TA and glut iso, sit to stand from elevated surface, s/l bow and arrow stretch with [place] - can do standing as well, rows arms straight and bent, trunk rot resisted arms straight and bent, sub-occipital stretch, walking and biking, modified dead lift without weight, TA iso   Updates to plan of care Pt continues to have functional impairments that are improving in PT and remains appropriate candidate to continued skilled PT services.   Plan for next session Recommend continue PT services due to improved but continued impairments 1x/every 1-2 weeks for 90 days. Assess response to attempting increased trunk strengthening and reassess neck, shoulder and trunk ROM and strength. Reassess ROM and progress with postural mobility -  thoracic extension - and strengthening exercises as able.   Comments   Comments Pt edgar's slight decreased neck flexibility today due to 2 recent falls - both more incidental versus balance concerns - but some increased R sided neck and L knee pain since. From last visits pt edgar's below age gender norms for 2 minute walking distance with continued improvements to pain levels and functional mobility but has continued ROM and upper body mobility and minimal strength deficits as well as SIJ and foot pain that limits her activity level. Pt remains good candidate for skilled PT services to address impairments and reach goals.   Total Session Time   Timed Code Treatment Minutes 54   Total Treatment Time (sum of timed and untimed services) 54

## 2024-10-13 ENCOUNTER — HEALTH MAINTENANCE LETTER (OUTPATIENT)
Age: 74
End: 2024-10-13

## 2024-10-15 NOTE — PROGRESS NOTES
[facility], Orthopaedic Oncology Surgery Consultation  by John Reyes M.D.    Janey Bo MRN# [id number]   Age: 68 year old YOB: 1950     Requesting physician: Dr. Jerod Redding, med oncology, [facility]  Dr. 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  H/o thyroid CA, breast CA, pancreatic CA    Procedures:  11/07/14, Left thigh mass marginal excision (Eric)  1/20/2017, Excision L thigh atypical lipomatous tumor 7 cm, deep. (Eric) [facility]  Jan-March 2021, External beam RT 50 Gy L thigh (Dixie) [facility]  4/16/2021, Intralesional excision, L thigh intraneural atypical lipomatous tumor (Eric) [facility]      I saw Viv for recheck and has been 3 years since her last surgical procedure for atypical lipomatous tumor and 10 years since her original diagnosis.  She had to recurrent tumors develop.  She reports no problems occasional swelling of her left lower extremity for which she wears a compressive stocking of her lymphedema.    Examination demonstrates 1+ lymphedema left lower extremity and full motion of her hip and knee.  Palpation reveals no evidence of tumor recurrence in the posterior thigh.    MRI of the thigh and pelvis as well as CT scan of the lungs demonstrates no evidence of recurrent or relapsed disease.    Impression:  I am delighted that she remains continuously disease-free since 2021 and no evidence of tumor relapse at this time.    Recommendations:  Repeat MRI examination of pelvis and thigh in 1 year.  No need for lung CT examination unless her medical oncologist Dr. Redding feels otherwise with respect to her other malignancies.    MD Brianna Sommers Family Professor  Oncology and Adult Reconstructive Surgery  Dept Orthopaedic Surgery, [facility]  [phone]  office, [phone] pager  [email or web address]    Total combined visit time and work time before and after clinic visit, independent of trainee, on encounter date = 20 min      Viv returns to see me today for 2 yr checkup after undergoing the above-mentioned surgery whereby her tumor was excised from within her sciatic nerve.  The tumor had infiltrated and was entwined within the nerve fascicles, and given the atypical, low-grade nature of this tumor, the sciatic nerve was preserved at the expense of sustaining positive margins.    Symptoms of discomfort consisting of mainly sensory symptoms of numbness and tingling have improved markedly she only has some residual symptoms along the lateral leg.      MRI examination reveals no evidence of recurrent tumor formation or questionable areas.    She had a second opinion in December 2021 at the [facility] seeing Dr. ELVER Ortega who concurred with the need for continued surveillance.  She was inquiring about experimental therapies and there are none.    Current MRI examination is compared with the baseline postop imaging study and there is no evidence of progressive tumor growth in the prior surgical fields.  There is evidence of post resection artifact and denervation atrophy of the surrounding hamstring muscles.  No enlarging lipomatous masses identified.      Assessment:   I reviewed the MRI findings with Viv and informed her that I see no evidence of tumor regrowth requiring surgical intervention.  Nonetheless, she is at high risk for tumor recurrence despite the aggressive surgical treatment and radiation.    Plan:  Return for follow-up in 12 months with repeat follow-up MRI examination extending from the iliac crest to knee joint until 5 years postop.      MD Brianna Sommers Family Professor  Oncology and Adult Reconstructive Surgery  Dept Orthopaedic Surgery, [facility]  [phone] office, [phone] pager  [email or web address]    Total  combined visit time and work time before and after clinic visit = 20 min

## 2024-10-16 ENCOUNTER — TELEPHONE (OUTPATIENT)
Dept: PHYSICAL THERAPY | Facility: REHABILITATION | Age: 74
End: 2024-10-16
Payer: MEDICARE

## 2024-10-16 NOTE — TELEPHONE ENCOUNTER
Called and talked with pt about new sciatic symptoms - pt doing medrol dose pack, icing, SI-loc brace and is going to trial traction at [facility] tomorrow. Kiki Troncoso PT, DPT, CLT

## 2024-10-21 ENCOUNTER — OFFICE VISIT (OUTPATIENT)
Dept: ORTHOPEDICS | Facility: CLINIC | Age: 74
End: 2024-10-21
Payer: MEDICARE

## 2024-10-21 DIAGNOSIS — C49.22 ATYPICAL LIPOMATOUS TUMOR OF LEFT LOWER EXTREMITY (H): Primary | ICD-10-CM

## 2024-10-21 PROCEDURE — 99213 OFFICE O/P EST LOW 20 MIN: CPT | Performed by: ORTHOPAEDIC SURGERY

## 2024-10-23 ENCOUNTER — TELEPHONE (OUTPATIENT)
Dept: ORTHOPEDICS | Facility: CLINIC | Age: 74
End: 2024-10-23
Payer: MEDICARE

## 2024-10-23 NOTE — TELEPHONE ENCOUNTER
Patient confirmed scheduled appointment:  Date: 10/20/205  Time: 2:40pm  Visit type: Return [facility] Tumor  Provider:   Location: [facility]  Testing/imaging: Same day MRI

## 2024-11-07 ENCOUNTER — THERAPY VISIT (OUTPATIENT)
Dept: PHYSICAL THERAPY | Facility: REHABILITATION | Age: 74
End: 2024-11-07
Payer: MEDICARE

## 2024-11-07 DIAGNOSIS — M54.12 CERVICAL RADICULAR PAIN: Primary | ICD-10-CM

## 2024-11-07 DIAGNOSIS — M47.812 FACET ARTHROPATHY, CERVICAL: ICD-10-CM

## 2024-11-07 DIAGNOSIS — M79.18 MYOFASCIAL PAIN: ICD-10-CM

## 2024-11-07 PROCEDURE — 97110 THERAPEUTIC EXERCISES: CPT | Mod: GP | Performed by: PHYSICAL THERAPIST

## 2024-11-07 NOTE — PATIENT INSTRUCTIONS
"Tennis balls in a sock  Lay so pressure is right at the base of the head and sit there for 30-60 seconds and then try to do 10-20 \"nods\" small up and down 1-2x/day  Can use a towel if needed to keep tennis balls in position      Okay to return to seated shoulder twist  "

## 2024-11-21 ENCOUNTER — THERAPY VISIT (OUTPATIENT)
Dept: PHYSICAL THERAPY | Facility: REHABILITATION | Age: 74
End: 2024-11-21
Payer: MEDICARE

## 2024-11-21 DIAGNOSIS — M54.12 CERVICAL RADICULAR PAIN: Primary | ICD-10-CM

## 2024-11-21 DIAGNOSIS — M79.18 MYOFASCIAL PAIN: ICD-10-CM

## 2024-11-21 DIAGNOSIS — M47.812 FACET ARTHROPATHY, CERVICAL: ICD-10-CM

## 2024-11-21 NOTE — PATIENT INSTRUCTIONS
Standing balancing on 1 foot in corner of cabinets/wall and try to balance for 10-30 seconds - x5-10 reps each side - if struggling then let yourself use 1 fingertip to help with balance      Adding back in laying down side rotation  DO JUST THE laying down version for the first week back into it - THEN can try to do it on the wall if back doing well    Try to get back into your rowing exercise with your band as well!

## 2025-01-25 ENCOUNTER — HEALTH MAINTENANCE LETTER (OUTPATIENT)
Age: 75
End: 2025-01-25

## 2025-02-06 ENCOUNTER — THERAPY VISIT (OUTPATIENT)
Dept: PHYSICAL THERAPY | Facility: REHABILITATION | Age: 75
End: 2025-02-06
Payer: MEDICARE

## 2025-02-06 DIAGNOSIS — M79.18 MYOFASCIAL PAIN: ICD-10-CM

## 2025-02-06 DIAGNOSIS — M54.12 CERVICAL RADICULAR PAIN: Primary | ICD-10-CM

## 2025-02-06 DIAGNOSIS — M47.812 FACET ARTHROPATHY, CERVICAL: ICD-10-CM

## 2025-02-06 PROCEDURE — 97112 NEUROMUSCULAR REEDUCATION: CPT | Mod: GP | Performed by: PHYSICAL THERAPIST

## 2025-02-06 PROCEDURE — 97110 THERAPEUTIC EXERCISES: CPT | Mod: GP | Performed by: PHYSICAL THERAPIST

## 2025-02-06 NOTE — PATIENT INSTRUCTIONS
"Tissue wants movement, blood flow and space and doesn't like inflammation - with space challenges in your neck when you get good blood flow and movement of the body part you can keep it \"happy\"    MRI/xray scans - can tell us changes in anatomy but cannot predict who has pain and who doesn't have pain      Exercises for 10-15 reps 1-2 sets 1x/day 5x/week    Bring back in seated hip abd with band - place band at bottom of thighs as you pull legs out    Bring back in pelvic floor inner thigh squeeze    Keep working on arm band exercises - pulling back straight arms, bent arms and pulling back while rotating hands away    Keep up with back and neck stretches and wall rotation stretches    ADD  Seated (or can do laying down in bed on your back) knee rotations in rolling chair    Hands on the table or desk in front of you and rotate your knees side to side       "

## 2025-05-03 ENCOUNTER — HEALTH MAINTENANCE LETTER (OUTPATIENT)
Age: 75
End: 2025-05-03

## 2025-06-10 ENCOUNTER — APPOINTMENT (OUTPATIENT)
Dept: URBAN - METROPOLITAN AREA CLINIC 256 | Age: 75
Setting detail: DERMATOLOGY
End: 2025-06-10

## 2025-06-10 VITALS — WEIGHT: 180 LBS | HEIGHT: 59.75 IN

## 2025-06-10 DIAGNOSIS — Z85.828 PERSONAL HISTORY OF OTHER MALIGNANT NEOPLASM OF SKIN: ICD-10-CM

## 2025-06-10 DIAGNOSIS — Z85.820 PERSONAL HISTORY OF MALIGNANT MELANOMA OF SKIN: ICD-10-CM

## 2025-06-10 DIAGNOSIS — Z71.89 OTHER SPECIFIED COUNSELING: ICD-10-CM

## 2025-06-10 DIAGNOSIS — D22 MELANOCYTIC NEVI: ICD-10-CM

## 2025-06-10 DIAGNOSIS — D69.2 OTHER NONTHROMBOCYTOPENIC PURPURA: ICD-10-CM

## 2025-06-10 DIAGNOSIS — L81.4 OTHER MELANIN HYPERPIGMENTATION: ICD-10-CM

## 2025-06-10 DIAGNOSIS — D18.0 HEMANGIOMA: ICD-10-CM

## 2025-06-10 DIAGNOSIS — L57.0 ACTINIC KERATOSIS: ICD-10-CM

## 2025-06-10 DIAGNOSIS — Z80.8 FAMILY HISTORY OF MALIGNANT NEOPLASM OF OTHER ORGANS OR SYSTEMS: ICD-10-CM

## 2025-06-10 DIAGNOSIS — L57.8 OTHER SKIN CHANGES DUE TO CHRONIC EXPOSURE TO NONIONIZING RADIATION: ICD-10-CM

## 2025-06-10 DIAGNOSIS — L82.1 OTHER SEBORRHEIC KERATOSIS: ICD-10-CM

## 2025-06-10 PROBLEM — D22.71 MELANOCYTIC NEVI OF RIGHT LOWER LIMB, INCLUDING HIP: Status: ACTIVE | Noted: 2025-06-10

## 2025-06-10 PROBLEM — D22.5 MELANOCYTIC NEVI OF TRUNK: Status: ACTIVE | Noted: 2025-06-10

## 2025-06-10 PROBLEM — D22.61 MELANOCYTIC NEVI OF RIGHT UPPER LIMB, INCLUDING SHOULDER: Status: ACTIVE | Noted: 2025-06-10

## 2025-06-10 PROBLEM — D22.39 MELANOCYTIC NEVI OF OTHER PARTS OF FACE: Status: ACTIVE | Noted: 2025-06-10

## 2025-06-10 PROBLEM — D23.72 OTHER BENIGN NEOPLASM OF SKIN OF LEFT LOWER LIMB, INCLUDING HIP: Status: ACTIVE | Noted: 2025-06-10

## 2025-06-10 PROBLEM — D22.72 MELANOCYTIC NEVI OF LEFT LOWER LIMB, INCLUDING HIP: Status: ACTIVE | Noted: 2025-06-10

## 2025-06-10 PROBLEM — D18.01 HEMANGIOMA OF SKIN AND SUBCUTANEOUS TISSUE: Status: ACTIVE | Noted: 2025-06-10

## 2025-06-10 PROBLEM — D22.62 MELANOCYTIC NEVI OF LEFT UPPER LIMB, INCLUDING SHOULDER: Status: ACTIVE | Noted: 2025-06-10

## 2025-06-10 PROCEDURE — OTHER LIQUID NITROGEN: OTHER

## 2025-06-10 PROCEDURE — 99213 OFFICE O/P EST LOW 20 MIN: CPT | Mod: 25

## 2025-06-10 PROCEDURE — OTHER COUNSELING: OTHER

## 2025-06-10 PROCEDURE — OTHER MIPS QUALITY: OTHER

## 2025-06-10 PROCEDURE — 17000 DESTRUCT PREMALG LESION: CPT

## 2025-06-10 PROCEDURE — 17003 DESTRUCT PREMALG LES 2-14: CPT

## 2025-06-10 ASSESSMENT — LOCATION DETAILED DESCRIPTION DERM
LOCATION DETAILED: RIGHT PROXIMAL DORSAL FOREARM
LOCATION DETAILED: LEFT PROXIMAL DORSAL FOREARM
LOCATION DETAILED: LEFT DISTAL POSTERIOR THIGH
LOCATION DETAILED: RIGHT VENTRAL DISTAL FOREARM
LOCATION DETAILED: RIGHT DISTAL POSTERIOR THIGH
LOCATION DETAILED: RIGHT ANTERIOR DISTAL THIGH
LOCATION DETAILED: LEFT ANTERIOR PROXIMAL THIGH
LOCATION DETAILED: LEFT LATERAL ABDOMEN
LOCATION DETAILED: LEFT INFERIOR CENTRAL MALAR CHEEK
LOCATION DETAILED: RIGHT DISTAL POSTERIOR UPPER ARM
LOCATION DETAILED: LEFT VENTRAL PROXIMAL FOREARM
LOCATION DETAILED: LEFT DISTAL POSTERIOR UPPER ARM
LOCATION DETAILED: LEFT SUPERIOR POSTERIOR HELIX
LOCATION DETAILED: LEFT INFERIOR MEDIAL MIDBACK

## 2025-06-10 ASSESSMENT — LOCATION ZONE DERM
LOCATION ZONE: TRUNK
LOCATION ZONE: ARM
LOCATION ZONE: EAR
LOCATION ZONE: FACE
LOCATION ZONE: LEG

## 2025-06-10 ASSESSMENT — LOCATION SIMPLE DESCRIPTION DERM
LOCATION SIMPLE: LEFT EAR
LOCATION SIMPLE: LEFT POSTERIOR THIGH
LOCATION SIMPLE: LEFT FOREARM
LOCATION SIMPLE: RIGHT THIGH
LOCATION SIMPLE: LEFT CHEEK
LOCATION SIMPLE: RIGHT FOREARM
LOCATION SIMPLE: RIGHT POSTERIOR THIGH
LOCATION SIMPLE: LEFT LOWER BACK
LOCATION SIMPLE: LEFT UPPER ARM
LOCATION SIMPLE: LEFT THIGH
LOCATION SIMPLE: ABDOMEN
LOCATION SIMPLE: RIGHT UPPER ARM

## 2025-06-17 ENCOUNTER — TRANSCRIBE ORDERS (OUTPATIENT)
Dept: OTHER | Age: 75
End: 2025-06-17

## 2025-06-17 DIAGNOSIS — M20.5X1 HALLUX LIMITUS, RIGHT: Primary | ICD-10-CM

## 2025-06-17 DIAGNOSIS — M77.41 METATARSALGIA, RIGHT FOOT: ICD-10-CM

## 2025-08-06 ENCOUNTER — THERAPY VISIT (OUTPATIENT)
Dept: PHYSICAL THERAPY | Facility: REHABILITATION | Age: 75
End: 2025-08-06
Attending: PODIATRIST
Payer: MEDICARE

## 2025-08-06 DIAGNOSIS — M77.41 METATARSALGIA OF RIGHT FOOT: ICD-10-CM

## 2025-08-06 DIAGNOSIS — M20.5X1 HALLUX LIMITUS OF RIGHT FOOT: Primary | ICD-10-CM

## 2025-08-06 PROCEDURE — 97110 THERAPEUTIC EXERCISES: CPT | Mod: GP

## 2025-08-06 PROCEDURE — 97161 PT EVAL LOW COMPLEX 20 MIN: CPT | Mod: GP

## 2025-08-06 ASSESSMENT — ACTIVITIES OF DAILY LIVING (ADL)
MAKING_SHARP_TURNS_WHILE_RUNNING_FAST: EXTREME DIFFICULTY OR UNABLE TO PERFORM ACTIVITY
STANDING_FOR_1_HOUR: MODERATE DIFFICULTY
PERFORMING_HEAVY_ACTIVITIES_AROUND_YOUR_HOME: EXTREME DIFFICULTY OR UNABLE TO PERFORM ACTIVITY
SQUATTING: QUITE A BIT OF DIFFICULTY
SHOPPING: EXTREME DIFFICULTY OR UNABLE TO PERFORM ACTIVITY
LIFTING_AN_OBJECT,_LIKE_A_BAG_OF_GROCERIES_FROM_THE_FLOOR: MODERATE DIFFICULTY
PLEASE_INDICATE_YOR_PRIMARY_REASON_FOR_REFERRAL_TO_THERAPY:: FOOT AND/OR ANKLE
ROLLING_OVER_IN_BED: MODERATE DIFFICULTY
PUTTING_ON_YOUR_SHOES_OR_SOCKS: A LITTLE BIT OF DIFFICULTY
WALKING_2_BLOCKS: EXTREME DIFFICULTY OR UNABLE TO PERFORM ACTIVITY
WALKING_A_MILE: EXTREME DIFFICULTY OR UNABLE TO PERFORM ACTIVITY
GETTING_INTO_OR_OUT_OF_A_CAR: MODERATE DIFFICULTY
WALKING_BETWEEN_ROOMS: A LITTLE BIT OF DIFFICULTY
GOING_UP_OR_DOWN_10_STAIRS: MODERATE DIFFICULTY
RUNNING_ON_EVEN_GROUND: EXTREME DIFFICULTY OR UNABLE TO PERFORM ACTIVITY
ANY_OF_YOUR_USUAL_WORK,_HOUSEWORK_OR_SCHOOL_ACTIVITIES: MODERATE DIFFICULTY
LEFS_RAW_SCORE: INCOMPLETE
RUNNING_ON_UNEVEN_GROUND: EXTREME DIFFICULTY OR UNABLE TO PERFORM ACTIVITY
LEFS_SCORE(%): INCOMPLETE
SITTING_FOR_1_HOUR: A LITTLE BIT OF DIFFICULTY
PERFORMING_LIGHT_ACTIVITIES_AROUND_YOUR_HOME: MODERATE DIFFICULTY

## 2025-08-14 ENCOUNTER — THERAPY VISIT (OUTPATIENT)
Dept: PHYSICAL THERAPY | Facility: REHABILITATION | Age: 75
End: 2025-08-14
Attending: PODIATRIST
Payer: MEDICARE

## 2025-08-14 DIAGNOSIS — M20.5X1 HALLUX LIMITUS OF RIGHT FOOT: Primary | ICD-10-CM

## 2025-08-14 DIAGNOSIS — M77.41 METATARSALGIA OF RIGHT FOOT: ICD-10-CM

## 2025-08-14 PROCEDURE — 97110 THERAPEUTIC EXERCISES: CPT | Mod: GP | Performed by: PHYSICAL THERAPIST

## 2025-08-14 PROCEDURE — 97140 MANUAL THERAPY 1/> REGIONS: CPT | Mod: GP | Performed by: PHYSICAL THERAPIST

## 2025-08-16 ENCOUNTER — HEALTH MAINTENANCE LETTER (OUTPATIENT)
Age: 75
End: 2025-08-16

## (undated) DEVICE — SU SILK 2-0 SH 30" K833H

## (undated) DEVICE — PREP POVIDONE-IODINE 7.5% SCRUB 4OZ BOTTLE MDS093945

## (undated) DEVICE — SPECIMEN CONTAINER 5OZ STERILE 2600SA

## (undated) DEVICE — DRSG TEGADERM ALGINATE AG 4X5" 90303

## (undated) DEVICE — PACK UNIVERSAL SPLIT 29131

## (undated) DEVICE — DRAPE CONVERTORS U-DRAPE 60X72" 8476

## (undated) DEVICE — DRAPE IOBAN INCISE 23X17" 6650EZ

## (undated) DEVICE — NDL 18GA 1.5" 305196

## (undated) DEVICE — SU PDS II 3-0 PS-2 18" Z497G

## (undated) DEVICE — Device

## (undated) DEVICE — PREP DURAPREP 26ML APL 8630

## (undated) DEVICE — SPONGE LAP 18X18" X8435

## (undated) DEVICE — LINEN BACK PACK 5440

## (undated) DEVICE — SPONGE RAY-TEC 4X8" 7318

## (undated) DEVICE — PACK LOWER EXTREMITY RIVERSIDE SOP32LEFSX

## (undated) DEVICE — GLOVE PROTEXIS BLUE W/NEU-THERA 7.5  2D73EB75

## (undated) DEVICE — SU PDS II 2-0 SH 27" Z317H

## (undated) DEVICE — PREP SKIN SCRUB TRAY 4461A

## (undated) DEVICE — DRAPE STOCKINETTE IMPERVIOUS 12" 1587

## (undated) DEVICE — DRAPE STOCKINETTE 8" 8586

## (undated) DEVICE — SU SILK 3-0 SH 30" K832H

## (undated) DEVICE — STRAP KNEE/BODY 31143004

## (undated) DEVICE — ESU CORD BIPOLAR GREEN 10-4000

## (undated) DEVICE — ESU GROUND PAD UNIVERSAL W/O CORD

## (undated) DEVICE — LINEN GOWN OVERSIZE 5408

## (undated) DEVICE — DRSG TEGADERM 4X10" 1627

## (undated) DEVICE — DRSG TELFA 3X8" 1238

## (undated) DEVICE — SUCTION MANIFOLD NEPTUNE 2 SYS 4 PORT 0702-020-000

## (undated) DEVICE — SU PDS II 2-0 CT-2 27"  Z333H

## (undated) DEVICE — SYR 10ML LL W/O NDL 302995

## (undated) DEVICE — GOWN XLG DISP 9545

## (undated) DEVICE — SOL NACL 0.9% IRRIG 1000ML BOTTLE 2F7124

## (undated) RX ORDER — CEFAZOLIN SODIUM 2 G/100ML
INJECTION, SOLUTION INTRAVENOUS
Status: DISPENSED
Start: 2021-04-16

## (undated) RX ORDER — ONDANSETRON 2 MG/ML
INJECTION INTRAMUSCULAR; INTRAVENOUS
Status: DISPENSED
Start: 2021-04-16

## (undated) RX ORDER — EPHEDRINE SULFATE 50 MG/ML
INJECTION, SOLUTION INTRAMUSCULAR; INTRAVENOUS; SUBCUTANEOUS
Status: DISPENSED
Start: 2021-04-16

## (undated) RX ORDER — ACETAMINOPHEN 325 MG/1
TABLET ORAL
Status: DISPENSED
Start: 2021-04-16

## (undated) RX ORDER — FENTANYL CITRATE 50 UG/ML
INJECTION, SOLUTION INTRAMUSCULAR; INTRAVENOUS
Status: DISPENSED
Start: 2021-04-16

## (undated) RX ORDER — HYDROCODONE BITARTRATE AND ACETAMINOPHEN 5; 325 MG/1; MG/1
TABLET ORAL
Status: DISPENSED
Start: 2021-04-16

## (undated) RX ORDER — TRIAMCINOLONE ACETONIDE 40 MG/ML
INJECTION, SUSPENSION INTRA-ARTICULAR; INTRAMUSCULAR
Status: DISPENSED
Start: 2021-04-16

## (undated) RX ORDER — HYDROMORPHONE HYDROCHLORIDE 1 MG/ML
INJECTION, SOLUTION INTRAMUSCULAR; INTRAVENOUS; SUBCUTANEOUS
Status: DISPENSED
Start: 2021-04-16

## (undated) RX ORDER — LIDOCAINE HYDROCHLORIDE 10 MG/ML
INJECTION, SOLUTION EPIDURAL; INFILTRATION; INTRACAUDAL; PERINEURAL
Status: DISPENSED
Start: 2021-04-16

## (undated) RX ORDER — KETOROLAC TROMETHAMINE 30 MG/ML
INJECTION, SOLUTION INTRAMUSCULAR; INTRAVENOUS
Status: DISPENSED
Start: 2021-04-16

## (undated) RX ORDER — LIDOCAINE HYDROCHLORIDE 20 MG/ML
INJECTION, SOLUTION EPIDURAL; INFILTRATION; INTRACAUDAL; PERINEURAL
Status: DISPENSED
Start: 2021-04-16

## (undated) RX ORDER — MEPERIDINE HYDROCHLORIDE 25 MG/ML
INJECTION INTRAMUSCULAR; INTRAVENOUS; SUBCUTANEOUS
Status: DISPENSED
Start: 2021-04-16

## (undated) RX ORDER — METHOCARBAMOL 750 MG/1
TABLET, FILM COATED ORAL
Status: DISPENSED
Start: 2021-04-16

## (undated) RX ORDER — FENTANYL CITRATE-0.9 % NACL/PF 10 MCG/ML
PLASTIC BAG, INJECTION (ML) INTRAVENOUS
Status: DISPENSED
Start: 2021-04-16

## (undated) RX ORDER — GLYCOPYRROLATE 0.2 MG/ML
INJECTION INTRAMUSCULAR; INTRAVENOUS
Status: DISPENSED
Start: 2021-04-16

## (undated) RX ORDER — PROPOFOL 10 MG/ML
INJECTION, EMULSION INTRAVENOUS
Status: DISPENSED
Start: 2021-04-16

## (undated) RX ORDER — CEFAZOLIN SODIUM 1 G/3ML
INJECTION, POWDER, FOR SOLUTION INTRAMUSCULAR; INTRAVENOUS
Status: DISPENSED
Start: 2021-04-16